# Patient Record
Sex: FEMALE | Race: WHITE | NOT HISPANIC OR LATINO | URBAN - METROPOLITAN AREA
[De-identification: names, ages, dates, MRNs, and addresses within clinical notes are randomized per-mention and may not be internally consistent; named-entity substitution may affect disease eponyms.]

---

## 2021-06-24 ENCOUNTER — APPOINTMENT (RX ONLY)
Dept: URBAN - METROPOLITAN AREA CLINIC 349 | Facility: CLINIC | Age: 41
Setting detail: DERMATOLOGY
End: 2021-06-24

## 2021-06-24 DIAGNOSIS — L57.0 ACTINIC KERATOSIS: ICD-10-CM

## 2021-06-24 DIAGNOSIS — D22 MELANOCYTIC NEVI: ICD-10-CM

## 2021-06-24 DIAGNOSIS — Z12.83 ENCOUNTER FOR SCREENING FOR MALIGNANT NEOPLASM OF SKIN: ICD-10-CM

## 2021-06-24 PROBLEM — D22.5 MELANOCYTIC NEVI OF TRUNK: Status: ACTIVE | Noted: 2021-06-24

## 2021-06-24 PROCEDURE — 99202 OFFICE O/P NEW SF 15 MIN: CPT | Mod: 25

## 2021-06-24 PROCEDURE — ? COUNSELING

## 2021-06-24 PROCEDURE — 17003 DESTRUCT PREMALG LES 2-14: CPT

## 2021-06-24 PROCEDURE — ? LIQUID NITROGEN

## 2021-06-24 PROCEDURE — 17000 DESTRUCT PREMALG LESION: CPT

## 2021-06-24 ASSESSMENT — LOCATION DETAILED DESCRIPTION DERM
LOCATION DETAILED: RIGHT NASAL SIDEWALL
LOCATION DETAILED: NASAL DORSUM
LOCATION DETAILED: RIGHT MEDIAL UPPER BACK
LOCATION DETAILED: EPIGASTRIC SKIN
LOCATION DETAILED: MIDDLE STERNUM

## 2021-06-24 ASSESSMENT — LOCATION ZONE DERM
LOCATION ZONE: NOSE
LOCATION ZONE: TRUNK

## 2021-06-24 ASSESSMENT — LOCATION SIMPLE DESCRIPTION DERM
LOCATION SIMPLE: RIGHT NOSE
LOCATION SIMPLE: RIGHT UPPER BACK
LOCATION SIMPLE: CHEST
LOCATION SIMPLE: ABDOMEN
LOCATION SIMPLE: NOSE

## 2021-06-24 ASSESSMENT — PAIN INTENSITY VAS: HOW INTENSE IS YOUR PAIN 0 BEING NO PAIN, 10 BEING THE MOST SEVERE PAIN POSSIBLE?: 1/10 PAIN

## 2021-11-05 ENCOUNTER — HOSPITAL ENCOUNTER (OUTPATIENT)
Dept: LAB | Age: 41
Discharge: HOME | End: 2021-11-05

## 2021-11-05 PROCEDURE — 88312 SPECIAL STAINS GROUP 1: CPT

## 2021-11-05 PROCEDURE — 88305 TISSUE EXAM BY PATHOLOGIST: CPT

## 2022-04-04 ENCOUNTER — HOSPITAL ENCOUNTER (EMERGENCY)
Age: 42
Discharge: HOME | End: 2022-04-04
Attending: EMERGENCY MEDICINE
Payer: COMMERCIAL

## 2022-04-04 ENCOUNTER — APPOINTMENT (OUTPATIENT)
Dept: ULTRASOUND IMAGING | Age: 42
End: 2022-04-04
Attending: PHYSICIAN ASSISTANT
Payer: COMMERCIAL

## 2022-04-04 VITALS
DIASTOLIC BLOOD PRESSURE: 75 MMHG | HEIGHT: 70 IN | SYSTOLIC BLOOD PRESSURE: 107 MMHG | OXYGEN SATURATION: 99 % | TEMPERATURE: 97.8 F | HEART RATE: 97 BPM | BODY MASS INDEX: 21.62 KG/M2 | WEIGHT: 151 LBS | RESPIRATION RATE: 20 BRPM

## 2022-04-04 DIAGNOSIS — R10.11 RUQ ABDOMINAL PAIN: Primary | ICD-10-CM

## 2022-04-04 LAB
ALBUMIN SERPL-MCNC: 3.7 G/DL (ref 3.5–5)
ALBUMIN/GLOB SERPL: 1 {RATIO} (ref 1.2–3.5)
ALP SERPL-CCNC: 81 U/L (ref 50–130)
ALT SERPL-CCNC: 19 U/L (ref 12–65)
ANION GAP SERPL CALC-SCNC: 6 MMOL/L (ref 7–16)
AST SERPL-CCNC: 13 U/L (ref 15–37)
BASOPHILS # BLD: 0 K/UL (ref 0–0.2)
BASOPHILS NFR BLD: 0 % (ref 0–2)
BILIRUB SERPL-MCNC: 0.5 MG/DL (ref 0.2–1.1)
BUN SERPL-MCNC: 8 MG/DL (ref 6–23)
CALCIUM SERPL-MCNC: 9.5 MG/DL (ref 8.3–10.4)
CHLORIDE SERPL-SCNC: 110 MMOL/L (ref 98–107)
CO2 SERPL-SCNC: 28 MMOL/L (ref 21–32)
CREAT SERPL-MCNC: 0.87 MG/DL (ref 0.6–1)
DIFFERENTIAL METHOD BLD: ABNORMAL
EOSINOPHIL # BLD: 0.1 K/UL (ref 0–0.8)
EOSINOPHIL NFR BLD: 1 % (ref 0.5–7.8)
ERYTHROCYTE [DISTWIDTH] IN BLOOD BY AUTOMATED COUNT: 12.4 % (ref 11.9–14.6)
GLOBULIN SER CALC-MCNC: 3.7 G/DL (ref 2.3–3.5)
GLUCOSE SERPL-MCNC: 100 MG/DL (ref 65–100)
HCG UR QL: NEGATIVE
HCT VFR BLD AUTO: 39.1 % (ref 35.8–46.3)
HGB BLD-MCNC: 13.1 G/DL (ref 11.7–15.4)
IMM GRANULOCYTES # BLD AUTO: 0 K/UL (ref 0–0.5)
IMM GRANULOCYTES NFR BLD AUTO: 0 % (ref 0–5)
LIPASE SERPL-CCNC: 104 U/L (ref 73–393)
LYMPHOCYTES # BLD: 0.9 K/UL (ref 0.5–4.6)
LYMPHOCYTES NFR BLD: 9 % (ref 13–44)
MCH RBC QN AUTO: 29.8 PG (ref 26.1–32.9)
MCHC RBC AUTO-ENTMCNC: 33.5 G/DL (ref 31.4–35)
MCV RBC AUTO: 89.1 FL (ref 79.6–97.8)
MONOCYTES # BLD: 0.4 K/UL (ref 0.1–1.3)
MONOCYTES NFR BLD: 4 % (ref 4–12)
NEUTS SEG # BLD: 8.9 K/UL (ref 1.7–8.2)
NEUTS SEG NFR BLD: 86 % (ref 43–78)
NRBC # BLD: 0 K/UL (ref 0–0.2)
PLATELET # BLD AUTO: 200 K/UL (ref 150–450)
PMV BLD AUTO: 9.4 FL (ref 9.4–12.3)
POTASSIUM SERPL-SCNC: 3.6 MMOL/L (ref 3.5–5.1)
PROT SERPL-MCNC: 7.4 G/DL (ref 6.3–8.2)
RBC # BLD AUTO: 4.39 M/UL (ref 4.05–5.2)
SODIUM SERPL-SCNC: 144 MMOL/L (ref 136–145)
WBC # BLD AUTO: 10.5 K/UL (ref 4.3–11.1)

## 2022-04-04 PROCEDURE — 99284 EMERGENCY DEPT VISIT MOD MDM: CPT

## 2022-04-04 PROCEDURE — 80053 COMPREHEN METABOLIC PANEL: CPT

## 2022-04-04 PROCEDURE — 74011250636 HC RX REV CODE- 250/636: Performed by: PHYSICIAN ASSISTANT

## 2022-04-04 PROCEDURE — 83690 ASSAY OF LIPASE: CPT

## 2022-04-04 PROCEDURE — 85025 COMPLETE CBC W/AUTO DIFF WBC: CPT

## 2022-04-04 PROCEDURE — 96375 TX/PRO/DX INJ NEW DRUG ADDON: CPT

## 2022-04-04 PROCEDURE — 81003 URINALYSIS AUTO W/O SCOPE: CPT

## 2022-04-04 PROCEDURE — 96374 THER/PROPH/DIAG INJ IV PUSH: CPT

## 2022-04-04 PROCEDURE — 74011250636 HC RX REV CODE- 250/636: Performed by: NURSE PRACTITIONER

## 2022-04-04 PROCEDURE — 76705 ECHO EXAM OF ABDOMEN: CPT

## 2022-04-04 PROCEDURE — 81025 URINE PREGNANCY TEST: CPT

## 2022-04-04 RX ORDER — OMEPRAZOLE 40 MG/1
40 CAPSULE, DELAYED RELEASE ORAL AS NEEDED
COMMUNITY

## 2022-04-04 RX ORDER — KETOROLAC TROMETHAMINE 30 MG/ML
15 INJECTION, SOLUTION INTRAMUSCULAR; INTRAVENOUS ONCE
Status: COMPLETED | OUTPATIENT
Start: 2022-04-04 | End: 2022-04-04

## 2022-04-04 RX ORDER — DICLOFENAC SODIUM 75 MG/1
75 TABLET, DELAYED RELEASE ORAL 2 TIMES DAILY
Qty: 14 TABLET | Refills: 0 | Status: SHIPPED | OUTPATIENT
Start: 2022-04-04 | End: 2022-04-11

## 2022-04-04 RX ORDER — ONDANSETRON 2 MG/ML
4 INJECTION INTRAMUSCULAR; INTRAVENOUS
Status: COMPLETED | OUTPATIENT
Start: 2022-04-04 | End: 2022-04-04

## 2022-04-04 RX ORDER — LORATADINE 10 MG/1
10 TABLET ORAL DAILY
COMMUNITY

## 2022-04-04 RX ORDER — ANASTROZOLE 1 MG/1
1 TABLET ORAL DAILY
COMMUNITY
Start: 2022-03-02

## 2022-04-04 RX ADMIN — KETOROLAC TROMETHAMINE 15 MG: 30 INJECTION, SOLUTION INTRAMUSCULAR at 12:19

## 2022-04-04 RX ADMIN — ONDANSETRON 4 MG: 2 INJECTION INTRAMUSCULAR; INTRAVENOUS at 11:36

## 2022-04-04 RX ADMIN — SODIUM CHLORIDE 1000 ML: 900 INJECTION, SOLUTION INTRAVENOUS at 12:18

## 2022-04-04 NOTE — ED TRIAGE NOTES
Pt states right sided abd pain since this morning. States she went to plastic surgeon this morning and had expanders removed. States she started a new medication on Sat. Denies vomiting or diarrhea. Masked for triage.

## 2022-04-04 NOTE — ED NOTES
I have reviewed discharge instructions with the patient. The patient verbalized understanding. Patient left ED via Discharge Method: ambulatory to Home with  Spouse. Opportunity for questions and clarification provided. Patient given 1 scripts. To continue your aftercare when you leave the hospital, you may receive an automated call from our care team to check in on how you are doing. This is a free service and part of our promise to provide the best care and service to meet your aftercare needs.  If you have questions, or wish to unsubscribe from this service please call [phone]. Thank you for Choosing our [address] [place] [address] Emergency Department.

## 2022-04-04 NOTE — ED PROVIDER NOTES
Patient presents to the emergency department stating that this morning around 845 she developed some right upper abdominal pain that started after eating food. Later she went to a plastic surgery follow-up appointment as she has a history of breast cancer and had a double mastectomy. She states that the surgeon looked at her wound on her breast and feels that things are healing well. They were concerned about her abdominal pain as I did not feel like it was related to her breast and recommended she go to the emergency department. Patient denies any fever, chills, vomiting or diarrhea. She felt mildly nauseous earlier today due to pain. Patient denies any chest pain or shortness of breath. States that her last surgical procedure was 2 weeks ago where she had the expanders removed from her breast as she was not a candidate for reconstructive surgery           Past Medical History:   Diagnosis Date    Gastrointestinal disorder     GERD       Past Surgical History:   Procedure Laterality Date    HX GYN      c section         No family history on file. Social History     Socioeconomic History    Marital status:      Spouse name: Not on file    Number of children: Not on file    Years of education: Not on file    Highest education level: Not on file   Occupational History    Not on file   Tobacco Use    Smoking status: Never Smoker    Smokeless tobacco: Not on file   Substance and Sexual Activity    Alcohol use: No    Drug use: No    Sexual activity: Not on file   Other Topics Concern    Not on file   Social History Narrative    Not on file     Social Determinants of Health     Financial Resource Strain:     Difficulty of Paying Living Expenses: Not on file   Food Insecurity:     Worried About Running Out of Food in the Last Year: Not on file    Deloris of Food in the Last Year: Not on file   Transportation Needs:     Lack of Transportation (Medical):  Not on file    Lack of Transportation (Non-Medical): Not on file   Physical Activity:     Days of Exercise per Week: Not on file    Minutes of Exercise per Session: Not on file   Stress:     Feeling of Stress : Not on file   Social Connections:     Frequency of Communication with Friends and Family: Not on file    Frequency of Social Gatherings with Friends and Family: Not on file    Attends Oriental orthodox Services: Not on file    Active Member of [address] [place] [address] or Organizations: Not on file    Attends Club or Organization Meetings: Not on file    Marital Status: Not on file   Intimate Partner Violence:     Fear of Current or Ex-Partner: Not on file    Emotionally Abused: Not on file    Physically Abused: Not on file    Sexually Abused: Not on file   Housing Stability:     Unable to Pay for Housing in the Last Year: Not on file    Number of [place] in the Last Year: Not on file    Unstable Housing in the Last Year: Not on file         ALLERGIES: Xylocaine [lidocaine hcl] and Pcn [penicillins]    Review of Systems   Constitutional: Negative for fever. Gastrointestinal: Positive for abdominal pain and nausea. All other systems reviewed and are negative. Vitals:    04/04/22 1115   BP: 131/81   Pulse: 97   Resp: 20   Temp: 97.8 °F (36.6 °C)   SpO2: 100%   Weight: 68.5 kg (151 lb)   Height: 5' 10\" (1.778 m)            Physical Exam  Vitals and nursing note reviewed. Constitutional:       Appearance: She is well-developed. HENT:      Head: Normocephalic and atraumatic. Nose: Nose normal.      Mouth/Throat:      Mouth: Mucous membranes are moist.   Eyes:      Extraocular Movements: Extraocular movements intact. Conjunctiva/sclera: Conjunctivae normal.      Pupils: Pupils are equal, round, and reactive to light. Cardiovascular:      Rate and Rhythm: Normal rate and regular rhythm. Pulses: Normal pulses. Heart sounds: Normal heart sounds.    Pulmonary:      Effort: Pulmonary effort is normal.      Breath sounds: Normal breath sounds. Abdominal:      General: Abdomen is flat. Palpations: Abdomen is soft. Comments: Patient has mild right upper quadrant tenderness to palpation with positive Starr sign. No masses or guarding. Mild rebound tenderness right upper quadrant   Musculoskeletal:         General: Normal range of motion. Cervical back: Normal range of motion and neck supple. Skin:     General: Skin is warm and dry. Capillary Refill: Capillary refill takes less than 2 seconds. Neurological:      General: No focal deficit present. Mental Status: She is alert. Mental status is at baseline. Psychiatric:         Mood and Affect: Mood normal.         Behavior: Behavior normal.         Thought Content: Thought content normal.          MDM  Number of Diagnoses or Management Options  Diagnosis management comments: Differential diagnoses: Cholelithiasis biliary colic, pancreatitis, pulmonary embolus, gastritis    I rechecked patient after IV Toradol and she is feeling dramatically better. Labs are all unremarkable and gallbladder ultrasound is normal.  I will have her continue to take an anti-inflammatory for pain and she can follow-up with GI if she continues to have the right upper quadrant pain after eating food. Patient has no chest pain or shortness of breath and denies any pleuritic pain.   I discussed management with ER attending Dr. Neetu Welch who is in agreement with plan       Amount and/or Complexity of Data Reviewed  Clinical lab tests: reviewed  Tests in the radiology section of CPT®: reviewed  Discuss the patient with other providers: yes    Risk of Complications, Morbidity, and/or Mortality  Presenting problems: moderate  Diagnostic procedures: moderate  Management options: moderate    Patient Progress  Patient progress: improved         Procedures

## 2022-04-04 NOTE — ED NOTES
35 y.o. F with RUQ pain that began this morning. Reports nausea. Denies vomiting, diarrhea, or fever. Still has gall bladder. Began taking anastrozole on Saturday. Recent surgery for hx of breast cancer. Bending forward improves pain. Patient appears uncomfortable in triage but no acute distress. VSS in triage. Afebrile. Patient evaluated initially in triage. Rapid Medical Evaluation was conducted and necessary orders have been placed. I have performed a medical screening exam.  Care will now be transferred to the provider in the back of the emergency department.   Dee Nails NP 11:13 AM

## 2022-04-04 NOTE — DISCHARGE INSTRUCTIONS
Work and gallbladder ultrasound are normal.  Your abdominal discomfort may be related to the new medication you started which can have GI side effects. I would recommend however following up with the GI physician listed later this week if you continue to have right upper quadrant pain after eating meals.   You could also buy over-the-counter Prilosec and take it daily with your pain reliever to help protect her stomach

## 2022-04-07 ENCOUNTER — APPOINTMENT (RX ONLY)
Dept: URBAN - METROPOLITAN AREA CLINIC 329 | Facility: CLINIC | Age: 42
Setting detail: DERMATOLOGY
End: 2022-04-07

## 2022-04-07 DIAGNOSIS — L81.4 OTHER MELANIN HYPERPIGMENTATION: ICD-10-CM

## 2022-04-07 DIAGNOSIS — D18.0 HEMANGIOMA: ICD-10-CM

## 2022-04-07 DIAGNOSIS — D22 MELANOCYTIC NEVI: ICD-10-CM

## 2022-04-07 DIAGNOSIS — L82.0 INFLAMED SEBORRHEIC KERATOSIS: ICD-10-CM

## 2022-04-07 DIAGNOSIS — L57.0 ACTINIC KERATOSIS: ICD-10-CM

## 2022-04-07 DIAGNOSIS — L57.8 OTHER SKIN CHANGES DUE TO CHRONIC EXPOSURE TO NONIONIZING RADIATION: ICD-10-CM

## 2022-04-07 DIAGNOSIS — L90.5 SCAR CONDITIONS AND FIBROSIS OF SKIN: ICD-10-CM

## 2022-04-07 PROBLEM — D18.01 HEMANGIOMA OF SKIN AND SUBCUTANEOUS TISSUE: Status: ACTIVE | Noted: 2022-04-07

## 2022-04-07 PROBLEM — D22.71 MELANOCYTIC NEVI OF RIGHT LOWER LIMB, INCLUDING HIP: Status: ACTIVE | Noted: 2022-04-07

## 2022-04-07 PROBLEM — D22.61 MELANOCYTIC NEVI OF RIGHT UPPER LIMB, INCLUDING SHOULDER: Status: ACTIVE | Noted: 2022-04-07

## 2022-04-07 PROBLEM — D22.5 MELANOCYTIC NEVI OF TRUNK: Status: ACTIVE | Noted: 2022-04-07

## 2022-04-07 PROCEDURE — ? DERMATOSCOPIC EVALUATION

## 2022-04-07 PROCEDURE — ? RECOMMENDATIONS

## 2022-04-07 PROCEDURE — ? DIAGNOSIS COMMENT

## 2022-04-07 PROCEDURE — ? EDUCATIONAL RESOURCES PROVIDED

## 2022-04-07 PROCEDURE — 17110 DESTRUCTION B9 LES UP TO 14: CPT

## 2022-04-07 PROCEDURE — 99213 OFFICE O/P EST LOW 20 MIN: CPT | Mod: 25

## 2022-04-07 PROCEDURE — ? ADDITIONAL NOTES

## 2022-04-07 PROCEDURE — ? LIQUID NITROGEN

## 2022-04-07 PROCEDURE — ? TREATMENT REGIMEN

## 2022-04-07 PROCEDURE — ? BENIGN DESTRUCTION

## 2022-04-07 PROCEDURE — 17000 DESTRUCT PREMALG LESION: CPT | Mod: 59

## 2022-04-07 PROCEDURE — ? COUNSELING

## 2022-04-07 ASSESSMENT — LOCATION SIMPLE DESCRIPTION DERM
LOCATION SIMPLE: ABDOMEN
LOCATION SIMPLE: CHEST
LOCATION SIMPLE: RIGHT SHOULDER
LOCATION SIMPLE: LEFT UPPER ARM
LOCATION SIMPLE: RIGHT BREAST
LOCATION SIMPLE: LEFT CHEEK
LOCATION SIMPLE: LEFT UPPER BACK
LOCATION SIMPLE: RIGHT PRETIBIAL REGION
LOCATION SIMPLE: RIGHT SUPERIOR EYELID
LOCATION SIMPLE: ANTERIOR SCALP
LOCATION SIMPLE: LEFT BREAST
LOCATION SIMPLE: LEFT THIGH
LOCATION SIMPLE: RIGHT UPPER ARM
LOCATION SIMPLE: UPPER BACK
LOCATION SIMPLE: NOSE
LOCATION SIMPLE: LEFT LOWER BACK
LOCATION SIMPLE: RIGHT LOWER BACK
LOCATION SIMPLE: RIGHT UPPER BACK
LOCATION SIMPLE: RIGHT POSTERIOR THIGH

## 2022-04-07 ASSESSMENT — LOCATION DETAILED DESCRIPTION DERM
LOCATION DETAILED: RIGHT INFERIOR MEDIAL MIDBACK
LOCATION DETAILED: LEFT ANTERIOR DISTAL THIGH
LOCATION DETAILED: RIGHT ANTERIOR DISTAL UPPER ARM
LOCATION DETAILED: RIGHT PROXIMAL PRETIBIAL REGION
LOCATION DETAILED: RIGHT MEDIAL BREAST 1-2:00 REGION
LOCATION DETAILED: LEFT ANTERIOR DISTAL UPPER ARM
LOCATION DETAILED: MID-FRONTAL SCALP
LOCATION DETAILED: LEFT CENTRAL MALAR CHEEK
LOCATION DETAILED: RIGHT DISTAL POSTERIOR THIGH
LOCATION DETAILED: SUPERIOR THORACIC SPINE
LOCATION DETAILED: LEFT LATERAL BREAST 12-1:00 REGION
LOCATION DETAILED: LEFT SUPERIOR UPPER BACK
LOCATION DETAILED: RIGHT SUPERIOR UPPER BACK
LOCATION DETAILED: LEFT ANTERIOR PROXIMAL UPPER ARM
LOCATION DETAILED: RIGHT ANTERIOR PROXIMAL UPPER ARM
LOCATION DETAILED: RIGHT RIB CAGE
LOCATION DETAILED: RIGHT LATERAL SUPERIOR EYELID
LOCATION DETAILED: LEFT INFERIOR MEDIAL MIDBACK
LOCATION DETAILED: RIGHT POSTERIOR SHOULDER
LOCATION DETAILED: LEFT LATERAL SUPERIOR CHEST
LOCATION DETAILED: EPIGASTRIC SKIN
LOCATION DETAILED: MIDDLE STERNUM
LOCATION DETAILED: LEFT MEDIAL UPPER BACK
LOCATION DETAILED: RIGHT NASAL DORSUM
LOCATION DETAILED: UPPER STERNUM

## 2022-04-07 ASSESSMENT — LOCATION ZONE DERM
LOCATION ZONE: EYELID
LOCATION ZONE: SCALP
LOCATION ZONE: ARM
LOCATION ZONE: LEG
LOCATION ZONE: TRUNK
LOCATION ZONE: NOSE
LOCATION ZONE: FACE

## 2022-04-07 NOTE — PROCEDURE: BENIGN DESTRUCTION
Detail Level: Detailed
Treatment Number (Will Not Render If 0): 0
Medical Necessity Information: It is in your best interest to select a reason for this procedure from the list below. All of these items fulfill various CMS LCD requirements except the new and changing color options.
Render Note In Bullet Format When Appropriate: No
Consent: The patient's consent was obtained including but not limited to risks of crusting, scabbing, blistering, scarring, darker or lighter pigmentary change, recurrence, incomplete removal and infection.
Medical Necessity Clause: This procedure was medically necessary because the lesions that were treated were:
Anesthesia Volume In Cc: 0.5
Post-Care Instructions: I reviewed with the patient in detail post-care instructions. Patient is to wear sunprotection, and avoid picking at any of the treated lesions. Pt may apply Vaseline to crusted or scabbing areas.
no

## 2022-04-07 NOTE — PROCEDURE: RECOMMENDATIONS
Recommendation Preamble: The following recommendations were made during the visit:
Recommendations (Free Text): Mederma or vitamin E
Render Risk Assessment In Note?: no
Detail Level: Zone

## 2022-06-24 ENCOUNTER — APPOINTMENT (RX ONLY)
Dept: URBAN - METROPOLITAN AREA CLINIC 329 | Facility: CLINIC | Age: 42
Setting detail: DERMATOLOGY
End: 2022-06-24

## 2022-06-24 DIAGNOSIS — D22 MELANOCYTIC NEVI: ICD-10-CM

## 2022-06-24 DIAGNOSIS — L81.4 OTHER MELANIN HYPERPIGMENTATION: ICD-10-CM

## 2022-06-24 DIAGNOSIS — D18.0 HEMANGIOMA: ICD-10-CM

## 2022-06-24 DIAGNOSIS — L57.8 OTHER SKIN CHANGES DUE TO CHRONIC EXPOSURE TO NONIONIZING RADIATION: ICD-10-CM

## 2022-06-24 DIAGNOSIS — L82.1 OTHER SEBORRHEIC KERATOSIS: ICD-10-CM

## 2022-06-24 PROBLEM — D18.01 HEMANGIOMA OF SKIN AND SUBCUTANEOUS TISSUE: Status: ACTIVE | Noted: 2022-06-24

## 2022-06-24 PROBLEM — D22.5 MELANOCYTIC NEVI OF TRUNK: Status: ACTIVE | Noted: 2022-06-24

## 2022-06-24 PROBLEM — D22.72 MELANOCYTIC NEVI OF LEFT LOWER LIMB, INCLUDING HIP: Status: ACTIVE | Noted: 2022-06-24

## 2022-06-24 PROCEDURE — 99213 OFFICE O/P EST LOW 20 MIN: CPT

## 2022-06-24 PROCEDURE — ? TREATMENT REGIMEN

## 2022-06-24 PROCEDURE — ? ADDITIONAL NOTES

## 2022-06-24 PROCEDURE — ? DERMATOSCOPIC EVALUATION

## 2022-06-24 PROCEDURE — ? COUNSELING

## 2022-06-24 PROCEDURE — ? DERMTECH: PLA + TERT ADD ON ASSAY

## 2022-06-24 ASSESSMENT — LOCATION DETAILED DESCRIPTION DERM
LOCATION DETAILED: LEFT ANTERIOR LATERAL PROXIMAL THIGH
LOCATION DETAILED: LEFT INFERIOR UPPER BACK
LOCATION DETAILED: RIGHT ANTERIOR DISTAL UPPER ARM
LOCATION DETAILED: MID-FRONTAL SCALP
LOCATION DETAILED: SUPERIOR THORACIC SPINE
LOCATION DETAILED: EPIGASTRIC SKIN
LOCATION DETAILED: LEFT ANTERIOR DISTAL UPPER ARM
LOCATION DETAILED: LEFT CENTRAL MALAR CHEEK
LOCATION DETAILED: UPPER STERNUM

## 2022-06-24 ASSESSMENT — LOCATION SIMPLE DESCRIPTION DERM
LOCATION SIMPLE: RIGHT UPPER ARM
LOCATION SIMPLE: LEFT UPPER BACK
LOCATION SIMPLE: ABDOMEN
LOCATION SIMPLE: CHEST
LOCATION SIMPLE: UPPER BACK
LOCATION SIMPLE: LEFT UPPER ARM
LOCATION SIMPLE: LEFT CHEEK
LOCATION SIMPLE: ANTERIOR SCALP
LOCATION SIMPLE: LEFT THIGH

## 2022-06-24 ASSESSMENT — LOCATION ZONE DERM
LOCATION ZONE: FACE
LOCATION ZONE: TRUNK
LOCATION ZONE: SCALP
LOCATION ZONE: LEG
LOCATION ZONE: ARM

## 2022-08-31 ENCOUNTER — APPOINTMENT (RX ONLY)
Dept: URBAN - METROPOLITAN AREA CLINIC 329 | Facility: CLINIC | Age: 42
Setting detail: DERMATOLOGY
End: 2022-08-31

## 2022-08-31 DIAGNOSIS — L20.89 OTHER ATOPIC DERMATITIS: ICD-10-CM

## 2022-08-31 DIAGNOSIS — D22 MELANOCYTIC NEVI: ICD-10-CM

## 2022-08-31 PROBLEM — D22.72 MELANOCYTIC NEVI OF LEFT LOWER LIMB, INCLUDING HIP: Status: ACTIVE | Noted: 2022-08-31

## 2022-08-31 PROBLEM — L20.84 INTRINSIC (ALLERGIC) ECZEMA: Status: ACTIVE | Noted: 2022-08-31

## 2022-08-31 PROCEDURE — ? COUNSELING

## 2022-08-31 PROCEDURE — ? PRESCRIPTION MEDICATION MANAGEMENT

## 2022-08-31 PROCEDURE — 99213 OFFICE O/P EST LOW 20 MIN: CPT

## 2022-08-31 ASSESSMENT — LOCATION SIMPLE DESCRIPTION DERM
LOCATION SIMPLE: LEFT POSTERIOR THIGH
LOCATION SIMPLE: LEFT THIGH
LOCATION SIMPLE: LEFT POSTERIOR THIGH
LOCATION SIMPLE: LEFT THIGH

## 2022-08-31 ASSESSMENT — LOCATION DETAILED DESCRIPTION DERM
LOCATION DETAILED: LEFT ANTERIOR LATERAL PROXIMAL THIGH
LOCATION DETAILED: LEFT DISTAL POSTERIOR THIGH
LOCATION DETAILED: LEFT ANTERIOR LATERAL PROXIMAL THIGH
LOCATION DETAILED: LEFT DISTAL POSTERIOR THIGH

## 2022-08-31 ASSESSMENT — LOCATION ZONE DERM
LOCATION ZONE: LEG
LOCATION ZONE: LEG

## 2022-08-31 ASSESSMENT — ITCH NUMERIC RATING SCALE: HOW SEVERE IS YOUR ITCHING?: 2

## 2022-10-25 ENCOUNTER — OFFICE VISIT (OUTPATIENT)
Dept: OBGYN CLINIC | Age: 42
End: 2022-10-25
Payer: COMMERCIAL

## 2022-10-25 VITALS
SYSTOLIC BLOOD PRESSURE: 110 MMHG | WEIGHT: 148 LBS | BODY MASS INDEX: 21.19 KG/M2 | HEIGHT: 70 IN | DIASTOLIC BLOOD PRESSURE: 80 MMHG

## 2022-10-25 DIAGNOSIS — Z12.4 SCREENING FOR CERVICAL CANCER: ICD-10-CM

## 2022-10-25 DIAGNOSIS — Z12.12 SCREENING FOR RECTAL CANCER: ICD-10-CM

## 2022-10-25 DIAGNOSIS — Z12.31 ENCOUNTER FOR SCREENING MAMMOGRAM FOR MALIGNANT NEOPLASM OF BREAST: ICD-10-CM

## 2022-10-25 DIAGNOSIS — N95.1 MENOPAUSAL SYMPTOM: ICD-10-CM

## 2022-10-25 DIAGNOSIS — Z01.419 WELL WOMAN EXAM WITH ROUTINE GYNECOLOGICAL EXAM: Primary | ICD-10-CM

## 2022-10-25 PROCEDURE — 99214 OFFICE O/P EST MOD 30 MIN: CPT | Performed by: OBSTETRICS & GYNECOLOGY

## 2022-10-25 PROCEDURE — 99396 PREV VISIT EST AGE 40-64: CPT | Performed by: OBSTETRICS & GYNECOLOGY

## 2022-10-25 NOTE — PATIENT INSTRUCTIONS
Vitamin D3 1,000 IU  Calcium Citrate 600mg The IC - Smart Diet    Many people with Interstitial Cystitis (IC) find that simple changes in their diet can help to control IC symptoms and avoid IC flare ups. Typically, avoiding foods high in acid and potassium, as well as beverages containing caffeine and alcohol, is a good idea. This helpful guide can help you make \"IC-Smart\" meal choices. Keep it handy for easy references when dining out or when preparing meals at home. Fruits:    Allowable - Blueberries, Melons (except Cantaloupe) and Pears  Avoid - All other fruits and juice    Vegetables:  Allowable - Homegrown Tomatoes and some Vegetables (except those listed below)  Avoid - Store-bought Tomatoes, Onions, Tofu, Soybeans, [place] Beans, and Suzie Beans    Milk/Dairy:  Allowable - Milk, American Cheese, Cottage Cheese, Frozen Yogurt, and White Chocolate  Avoid - Yogurt, Sour Cream, Aged Cheeses, and Chocolate    Carbohydrates/Grains:  Allowable - Pasta, Rise, Potatoes, and some Breads (except those listed below)  Avoid - [place] and Sourdough Breads    Meats/Fish:  Allowable - Poultry, Fish and some meats (except those listed below)  Avoid - Aged, Canned, Cured, Processed, or Smoked meats/fish, Anchovies, Caviar, Chicken Livers, Corned Beef, and meats contain Nitrates or Nitrites    Nuts:  Allowable - Almonds, Cashews, and Pine Nuts  Avoid - Most other Nuts    Beverages:  Allowable - Bottled or Spring Water, Decaffeinated-acid free Coffee or Tea, some Herbal Teas, Flat Soda  Avoid - Alcoholic Beverages including Beer and Wine, Carbonated drinks such as Soda.   Coffee and Tea, and Fruit Juices especially Citrus and Cranberry    Seasonings:  Allowable - Garlic and some other seasonings (except those listed below)  Avoid - Mayonnaise, Miso, Soy Sauce, Salad Dressing, Vinegar and Spicy Foods (especially [place], [place], [facility], [place] ([facility]) and [employer])    Preservatives:  Avoids - Benzol Alcohol, Citric Acid, Monosodium Glutamate of the scalp, and it grew back when [facility] was discontinued. [facility] is a weak anticoagulant (blood thinner), which may cause increased bleeding. Call your doctor if you will be undergoing surgery or will begin taking anticoagulant therapy, such as Warfain, Heparin, or high doses of Aspirin; or anti-inflammatory drugs, such as Ibuprofen. Please see full Prescription information available from your physician. TWENTY MINUTES TO IMPROVED BLADDER CONTROL    Millions of women experience some degree of urinary incontinence or involuntary urination. The following pelvic floor exercises, called Kegel exercises, can help you to improve bladder control. Set aside some time in the morning, afternoon, and evening. Sit comfortably upright and relax. Although this is an important exercise, there is no need to strain. Before doing Kegel exercises, it is necessary to identify the pelvic-floor muscle. Place your finger inside your vagina. Squeeze around your finger. That's the muscle you want to exercise. Once you have isolated the muscle several times, there is no need to place your finger in the vagina to do the exercise. 1.  SQUEEZE AND HOLD FOR 10 SECONDS          Then relax for 10 seconds. Remember, it is as important to relax as it is to squeeze this muscle. 2.  WHEN TO DO THIS EXERCISE          15 times in the morning          15 times in the afternoon          20 times at night          Try to work up to doing 25 exercises at one time. Initially, you may not be able to hold and squeeze for 10 seconds. Don't get            Discouraged. Over a 2-week period your technique should improve. 3  THINGS TO REMEMBER          A. Never use your stomach, legs or buttock muscles. Place your hand on  your abdomen while you are squeezing your pelvic                Floor muscle. If you feel your abdomen move, then you are also using these muscles.             B.  Build strength slowly; don't expect results right away. C.  The great thing about this exercise is that after you bcome familiar with it, it can be done anyplace. No one can you see you                 Exercing this internal muscle. 4.   WHEN YOU WILL SEE A CHANGE           After about 2 weeks of doing this exercise you should notice fewer \"accidents\".

## 2022-10-25 NOTE — PROGRESS NOTES
Davida Patel  is a 38 y.o. No obstetric history on file. who is here for an annual exam.      History  Past Medical History:   Diagnosis Date    Breast cancer in female [facility])     Gastrointestinal disorder     GERD    ON FILE  Past Surgical History:   Procedure Laterality Date    GYN      c section    LYMPH NODE DISSECTION N/A     OTHER SURGICAL HISTORY      double masectomy    PRESENT IN FILE  Current Outpatient Medications on File Prior to Visit   Medication Sig Dispense Refill    leuprolide (LUPRON) 11.25 MG injection Inject 11.25 mg into the muscle once      anastrozole (ARIMIDEX) 1 MG tablet Take 1 mg by mouth daily      lansoprazole (PREVACID) 15 MG delayed release capsule Take by mouth every morning (before breakfast)      loratadine (CLARITIN) 10 MG tablet Take 10 mg by mouth daily      omeprazole (PRILOSEC) 40 MG delayed release capsule Take 40 mg by mouth as needed       No current facility-administered medications on file prior to visit. SEE UPDATED LIST  Allergies   Allergen Reactions    Lidocaine Hcl Anaphylaxis    Penicillins Rash    Iodine Swelling    Clindamycin/Lincomycin Rash   SEE LIST  Social History     Tobacco Use    Smoking status: Never    Smokeless tobacco: Never   Substance Use Topics    Alcohol use: No      No family history on file. OBGYN History:             Physical Exam  Blood pressure 110/80, height 5' 10\" (1.778 m), weight 148 lb (67.1 kg). Body mass index is 21.24 kg/m². Lab Results   Component Value Date/Time    HGB 13.1 04/04/2022 11:32 AM      @LASTPROCAMB([id number])@  No results found for: HCGUQC, PREGU, HCGQR, THCGA1    HEENT unremarkable. Sclera non-icteric. Neck is supple without thyromegaly or nodes. Chest clear to auscultation. Heart regular rate and rhythm with no murmur, Breast exam reveals no masses or nipple discharge. No axillary notes are palpable. Abdomen is benign. BUS is normal.  Cervix IF  present. Pap smeaR performed. PRN  Bimanual exam reveals no masses. Assessment  38 y.o. No obstetric history on file. for annual exam.  Encounter Diagnoses   Name Primary? Well woman exam with routine gynecological exam Yes    Screening for cervical cancer     Encounter for screening mammogram for malignant neoplasm of breast     Screening for rectal cancer        Plan  Orders Placed This Encounter   Procedures    AMB POC FECAL BLOOD, OCCULT, QL 1 CARD   Br ca   lupron  etc   vag x1 cs x1  oab ? ? Some nocturia   dr Carreno Scrape  nodes  1 of 12 pos  col 2022 no polyps  to see Dr Constance Nicole  and consider oopherect has [place] decl fert   \"DEXA ordered\",AT AGE 61          \"Screening olonoscopy ordered\",IF >44YO  DUE         \"Recommend Calcium/MVI\",IF >35YRS  \"Recommend Gardisil immunization\"IF AGE 9-45     }CONTRACEPTION DISCUSSED      STD CHECK OFFERED IF <30YO  ,MAMMOGRAM SCHEDULED IF >41YO          MOOD DISCUSSED             NOT SUICIDAL  HEALTH MEASURE DISCUSSED INCLUDING TEETH/EYE [facility]  APPTS                    DIET/EXERCISE /SLEEP    DISCUSSED                SMOKING DISCUSSED PRN      BLADDER CONTROL DISCUSSED      The patient is here for  problems including  vag atro     HISTORY:      No LMP recorded. (Menstrual status: Chemically Induced). SEE BELOW      Current Outpatient Medications on File Prior to Visit   Medication Sig Dispense Refill    leuprolide (LUPRON) 11.25 MG injection Inject 11.25 mg into the muscle once      anastrozole (ARIMIDEX) 1 MG tablet Take 1 mg by mouth daily      lansoprazole (PREVACID) 15 MG delayed release capsule Take by mouth every morning (before breakfast)      loratadine (CLARITIN) 10 MG tablet Take 10 mg by mouth daily      omeprazole (PRILOSEC) 40 MG delayed release capsule Take 40 mg by mouth as needed       No current facility-administered medications on file prior to visit. SEE LIST    ROS:      PHYSICAL EXAM:  Blood pressure 110/80, height 5' 10\" (1.778 m), weight 148 lb (67.1 kg).     The patient appears well, alert, oriented x 3, in no distress. Lungs are clear. Heart RRR, no murmurs. Abdomen soft without tenderness, guarding, mass or organomegaly. Pelvic: bg     ASSESSMENT:DIAGNOSIS DISCUSSED INCLUDING DIFFERENTIAL vag atro     PLAN:    Orders Placed This Encounter   Procedures    PAP LB, Reflex HPV ASCUS     Standing Status:   Future     Number of Occurrences:   1     Standing Expiration Date:   10/25/2023     Order Specific Question:   Pap Source? (Required)     Answer:   cervical     Order Specific Question:   Pap Source? (Required)     Answer:   endocervical     Order Specific Question:   Pap collection method? (Required     Answer:   brush     Order Specific Question:   Pap collection method? (Required     Answer:   spatula     Order Specific Question:   Menstrual Status ? Answer:   Postmenopausal [2]     Order Specific Question:   Previous Treatment?           (Required)     Answer:   NONE     Complex high risk decision making many questions answered history reviewed precharting done required 30 minute of time discussing a vaiety of problems  goals are set  follow up planned

## 2022-10-27 ENCOUNTER — HOSPITAL ENCOUNTER (OUTPATIENT)
Dept: PHYSICAL THERAPY | Age: 42
Setting detail: RECURRING SERIES
Discharge: HOME | End: 2022-10-30
Payer: COMMERCIAL

## 2022-10-27 DIAGNOSIS — C50.919 MALIGNANT NEOPLASM OF FEMALE BREAST, UNSPECIFIED ESTROGEN RECEPTOR STATUS, UNSPECIFIED LATERALITY, UNSPECIFIED SITE OF BREAST (HCC): Primary | ICD-10-CM

## 2022-10-27 PROCEDURE — 97140 MANUAL THERAPY 1/> REGIONS: CPT

## 2022-10-27 PROCEDURE — 97165 OT EVAL LOW COMPLEX 30 MIN: CPT

## 2022-10-27 ASSESSMENT — PAIN SCALES - GENERAL: PAINLEVEL_OUTOF10: 3

## 2022-10-27 NOTE — THERAPY EVALUATION
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 Referring Physician:  Colin Cali MD MD Orders:  OT Eval and Treat   Return MD Appt:  TBD  Date of Onset:  No data recorded   Allergies:  Lidocaine hcl, Penicillins, Iodine, and Clindamycin/lincomycin  Restrictions/Precautions:    No data recorded  No data recorded  Medications Last Reviewed:  10/27/2022     SUBJECTIVE   History of Injury/Illness (Reason for Referral):  Bilateral mastectomies with right breast showing multifocal disease with at least 10 lesions, largest measuring microscopic positive sentinel lymph and 11 further lymph nodes negative on axillary dissection. On her invasive specimen, ER was strongly positive, MO 13% and HER-2 2+ by IHC. This was amplified by FISH. She completed a full year of Herceptin at the end of May 2022. Arimidex initiated 2022. Is currently on Lupron injection monthly. She is considering bilateral oophorectomy. She is not sure when this will be scheduled, she plans    One of her biggest issues is chronic discomfort in the right axilla and upper extremity post surgery. She also has some hot flashes and sleep issues. She has some cramping in her legs. She has a gabapentin prescription but does not plan to start this right now.  She has been going to lymphedema therapy. Patient Stated Goal(s):  \"heavy and painful arm. Reduce fibrotic tissue\"  Initial:     3/10 Post Session:      /10  Past Medical History/Comorbidities:   Ms. Carlos Ware  has a past medical history of Breast cancer in [facility]) and Gastrointestinal disorder. Ms. Carlos Ware  has a past surgical history that includes gyn; other surgical history; and lymph node dissection (N/A). Social History/Living Environment:   Lives With: Spouse; Family  Type of Home: House     Prior Level of Function/Work/Activity:   Prior level of function: Independent  Occupation: Works at home  No data recorded  No data recorded     Learning:   Does the patient/guardian have any barriers to learning?: No barriers     Fall Risk Scale  Cuellar Total Score: 0  Cuellar Fall Risk: Low (0-24)           OBJECTIVE   Lymphedema:  PRETREATMENT AFFECTED LIMB(s): right upper extremity      Date:  10/27/22         Right / Left           Axilla   []      []           3 inches   []      [] R29  L28          Elbow   []      []           6 inches   []      []           3 inches   []      []           Wrist   []      []           Palm   []      []              []      []         Measurements are taken in centimeters:  2.54 cm = 1 inch  Cumulative circumferential volumetric graph measurements  RUE limb size         LUE limb size                Skin Integumentary:  Skin Integrity: Scars (comment) (mastectomy and lymph dissection)  LLIS:  Total Score: 34    ASSESSMENT   Initial Assessment:  Patient has stage 1 lymphedema in RUE and right upper chest quadrant. Patient has had double mastectomies, chemotherapy, no radiation, and 12 axilla lymph nodes removed. Has minimal swelling in right arm and has JUZO compression sleeve size 2 and wears as much as she can tolerate over the last month.  May need to find compression alternatives for RUE and chest. Pt with proximal fibrotic edema and will benefit from a complex pneumatic pressure pump to assist with home lymph drainage. Patient will also benefit from MLD and exercise to reduce size, increase ROM, and decrease pain in RUE. Also plan to improve surgical scars and treatment to address painful cording in elbow. Initiate OT POC. Problem List (Impacting functional limitations):  Performance deficits / Impairments: Decreased ROM     Therapy Prognosis:   Prognosis: Good     Assessment Complexity:   Low Complexity  PLAN   Effective Dates: 50/16/52 TO Certification Period Expiration Date: 01/25/23 . Frequency/Duration: Plan Frequency: 2x a week for a month   Interventions Planned: (Treatment may consist of any combination of the following)  Current Treatment Recommendations: ROM; Manual Therapy:  MLD; Modalities; Patient/Caregiver education & training; Safety education & training; Strengthening     Short-Term Functional Goals: Time Frame: 30 days  1. The patient/caregiver will verbalize and demonstrate understanding of lymphedema precautions. 2. Patient will be independent with skin care regimen to decrease risk of cellulitis. 3. The patient/caregiver will be independent at donning and doffing right upper extremity compression sleeve and application of chest compression. 4. The patient/caregiver will be independent with self-manual lymph drainage techniques and show decrease in limb volume. 5. Patient will be independent in lymphatic exercises. 6. Patient will reduce painful cording to 1 out of 10 in elbow. 7. Patient will increase length of time wearing day time compression to 8 hours a day. Discharge Goals: Time Frame: 90 days  1. Patient's right upper extremity circumferential measurements will decrease on volumetric graph to maximize functional use in ADL's.    2. The patient/caregiver will be independent with home management of lymphedema. 3. Patient will increase length of time wearing day time compression to 12+ hours a day.                MEDICAL NECESSITY:   > Skilled intervention continues to be required due to Deficits noted abvoe. REASON FOR SERVICES/OTHER COMMENTS:  > Patient continues to require skilled intervention due to Dx above. Manual Lymph Drainage: 15 mintues. Lymph Nodes:    Cervical Supraclavicular Axillary Abdominal Inguinal Popliteal Antecubital   RIGHT [x]     [x]     [x]     [x]     []     []     []       LEFT []     []     [x]     [x]     []     []     []         Anastamoses:   Axillo-axillary Inguino-inguinal Axillo-inguinal Inguino-axillary   ANTERIOR [x]     []     [x]     []       POSTERIOR [x]     []     [x]     []       RIGHT [x]     []     [x]     []       LEFT []     []     []     []         Limbs:   [x]    RUE     []    LUE     []    RLE    []    LLE      Total Duration:  Time In: 0800  Time Out: 0900    Regarding Mynor Suzanne Iqbal's therapy, I certify that the treatment plan above will be carried out by a therapist or under their direction. Thank you for this referral,  Yair Dumont OT     Referring Physician Signature: Germán Zavala MD No Signature is Required for this note.         Post Session Pain  Charge Capture   POC Link  Treatment Note Link  MD Guidelines  Brendenharangel

## 2022-10-30 LAB
CYTOLOGIST CVX/VAG CYTO: NORMAL
CYTOLOGY CVX/VAG DOC THIN PREP: NORMAL
HPV REFLEX: NORMAL
Lab: NORMAL
PATH REPORT.FINAL DX SPEC: NORMAL
STAT OF ADQ CVX/VAG CYTO-IMP: NORMAL

## 2022-11-07 ENCOUNTER — OFFICE VISIT (OUTPATIENT)
Dept: ONCOLOGY | Age: 42
End: 2022-11-07
Payer: COMMERCIAL

## 2022-11-07 VITALS
RESPIRATION RATE: 16 BRPM | WEIGHT: 149.4 LBS | DIASTOLIC BLOOD PRESSURE: 85 MMHG | OXYGEN SATURATION: 100 % | HEART RATE: 93 BPM | SYSTOLIC BLOOD PRESSURE: 112 MMHG | HEIGHT: 70 IN | TEMPERATURE: 98.6 F | BODY MASS INDEX: 21.39 KG/M2

## 2022-11-07 DIAGNOSIS — Z17.0 MALIGNANT NEOPLASM OF CENTRAL PORTION OF RIGHT BREAST IN FEMALE, ESTROGEN RECEPTOR POSITIVE (HCC): ICD-10-CM

## 2022-11-07 DIAGNOSIS — C50.111 MALIGNANT NEOPLASM OF CENTRAL PORTION OF RIGHT BREAST IN FEMALE, ESTROGEN RECEPTOR POSITIVE (HCC): ICD-10-CM

## 2022-11-07 PROCEDURE — 99205 OFFICE O/P NEW HI 60 MIN: CPT | Performed by: INTERNAL MEDICINE

## 2022-11-07 RX ORDER — ACETAMINOPHEN 325 MG/1
650 TABLET ORAL
OUTPATIENT
Start: 2022-11-10

## 2022-11-07 RX ORDER — ALBUTEROL SULFATE 90 UG/1
4 AEROSOL, METERED RESPIRATORY (INHALATION) PRN
OUTPATIENT
Start: 2022-11-10

## 2022-11-07 RX ORDER — EPINEPHRINE 1 MG/ML
0.3 INJECTION, SOLUTION, CONCENTRATE INTRAVENOUS PRN
OUTPATIENT
Start: 2022-11-10

## 2022-11-07 RX ORDER — FAMOTIDINE 10 MG/ML
20 INJECTION, SOLUTION INTRAVENOUS
OUTPATIENT
Start: 2022-11-10

## 2022-11-07 RX ORDER — ONDANSETRON 2 MG/ML
8 INJECTION INTRAMUSCULAR; INTRAVENOUS
OUTPATIENT
Start: 2022-11-10

## 2022-11-07 RX ORDER — DIPHENHYDRAMINE HYDROCHLORIDE 50 MG/ML
50 INJECTION INTRAMUSCULAR; INTRAVENOUS
OUTPATIENT
Start: 2022-11-10

## 2022-11-07 RX ORDER — SODIUM CHLORIDE 9 MG/ML
INJECTION, SOLUTION INTRAVENOUS CONTINUOUS
OUTPATIENT
Start: 2022-11-10

## 2022-11-07 ASSESSMENT — PATIENT HEALTH QUESTIONNAIRE - PHQ9
SUM OF ALL RESPONSES TO PHQ QUESTIONS 1-9: 0
SUM OF ALL RESPONSES TO PHQ QUESTIONS 1-9: 0
2. FEELING DOWN, DEPRESSED OR HOPELESS: 0
1. LITTLE INTEREST OR PLEASURE IN DOING THINGS: 0
SUM OF ALL RESPONSES TO PHQ QUESTIONS 1-9: 0
SUM OF ALL RESPONSES TO PHQ QUESTIONS 1-9: 0
SUM OF ALL RESPONSES TO PHQ9 QUESTIONS 1 & 2: 0

## 2022-11-07 NOTE — PATIENT INSTRUCTIONS
Patient Instructions from Today's Visit    Reason for Visit:  New Patient    Diagnosis Information:  [email or web address]    Plan:  - We will reach out to our Financial Team for approval for lupron. - We will schedule you for a Lupron injection within the next week or so. - We will do Lupron each month and see you back in the office in 3 months. Follow Up:  - as scheduled    Recent Lab Results:  No visits with results within 3 Day(s) from this visit. Latest known visit with results is:   Office Visit on 10/25/2022   Component Date Value Ref Range Status    Diagnosis: 10/25/2022 Comment    Final    Comment: (NOTE)  NEGATIVE FOR INTRAEPITHELIAL LESION OR MALIGNANCY. Specimen adequacy: 10/25/2022 Comment    Final    Comment: (NOTE)  Satisfactory for evaluation. Endocervical and/or squamous metaplastic  cells (endocervical component) are present. Performed by: 10/25/2022 Comment    Final    Comment: (NOTE)  Jonathan Tamayo Cytotechnologist (ASCP)      Note: 10/25/2022 Comment    Final    Comment: (NOTE)  The Pap smear is a screening test designed to aid in the detection of  premalignant and malignant conditions of the uterine cervix. It is  not a diagnostic procedure and should not be used as the sole means  of detecting cervical cancer. Both false-positive and false-negative  reports do occur. Methodology: 10/25/2022 Comment    Final    Comment: (NOTE)  This liquid based ThinPrep(R) pap test was screened with the  use of an image guided system. HPV Reflex 10/25/2022 Comment    Final    Comment: (NOTE)  The HPV DNA reflex criteria were not met with this specimen result  therefore, no HPV testing was performed. No. of containers. Hazel Yoo 01 ThinPrep Vial  Performed At: [facility]  [facility] [address], [place] [id number]  Farrukh Sadler MD TU:[id number]  Performed At: [facility] [id number]  [address] [place], [place] [id number]  Shanice Austin MD :[id number]       Treatment Summary has been discussed and given to patient: n/a    -------------------------------------------------------------------------------------------------------------------  Please call our office at [phone] if you have any  of the following symptoms:   Fever of 100.5 or greater  Chills  Shortness of breath  Swelling or pain in one leg    After office hours an answering service is available and will contact a provider for emergencies or if you are experiencing any of the above symptoms. Patient did express an interest in My Chart. My Chart log in information explained on the after visit summary printout at the [facility] desk.     Kathleen Beltrán MA

## 2022-11-07 NOTE — PROGRESS NOTES
New Patient Abstract    Reason for Referral: Malignant neoplasm of female breast, unspecified estrogen receptor status, unspecified laterality, unspecified site of breast    Referring Provider:  Khadijah Murphy MD    Primary Care Provider: DAVID Weber NP    Family History of Cancer/Hematologic Disorders: Family history is significant for maternal great grandmother with colon cancer. Presenting Symptoms: Palpable abnormality and pain in the left breast    Narrative with recent with Results/Procedures/Biopsies and Dates completed: Ms. Carlos Ware is a 78-year-old white female with a history of Marfan syndrome, GERD, depression, anxiety, esophageal polyp,  section, and tubal ligation. In the Spring of 2021, she developed a palpable abnormality and pain in her left breast. Bilateral digital diagnostic mammogram 3D/2D and targeted right ultrasound on 2021 identified an abnormal calcification in the central and mid upper portion of the right breast.  A hypoechoic lesion was seen on ultrasound corresponding to the dominant collection of calcifications seen on mammogram at the 12 o'clock position areolar margin. Since the calcifications appeared to extend over a larger area than the ultrasound finding, stereotactic biopsy was recommended for further evaluation. 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      Bilateral MRI of breasts with CAD was obtained on 2021 demonstrating extensive suspicious discontiguous clustered ring enhancement involving the right UOQ extending to the midline and just below the nipple laterally mimicking the distribution of the calcifications noted mammographically and consistent with the known DCIS; 1.4 cm separate area of clustered ring enhancement in the right axillary tail, suspicious for additional disease; and left breast with no significant enhancing lesions. She was evaluated in consultation at [facility] breast [address] on 4/15/22. Genetic counseling and testing with 84 gene panel sent on 4/15/2021 was negative for pathogenic mutation. She ultimately decided to proceed with bilateral mastectomy with immediate reconstruction and right sentinel node biopsy which was performed on 5/10/21. Pathology from the left breast showed atypical ductal hyperplasia. Pathology from the right breast showed ER 93%/PR13%/HER-2 positive by FISH, poorly differentiated multifocal (10) invasive ductal adenocarcinoma with the largest lesion measuring 1.7 cm with micrometastasis to 1 sentinel lymph node. Completion axillary dissection and port placement was performed on 5/25/2021 with 11 benign lymph nodes. Pathologic stage IA (pT1c, pN1mi(sn), cM0, G3, ER+, HI+, HER2+). Patient was treated with adjuvant Taxotere, carboplatin, and Herceptin x 6 cycles 6/10/21 - 9/30/2021 with intent for Herceptin up to 1 year of therapy (last dose Herceptin 5/4/22). She was menopausal by hormones, and Arimidex was initiated in March of 2022. She is currently on Lupron injection monthly as well. On 5/11/22 she underwent limited ultrasound of the left breast for further evaluation of a palpable concern in her left chest wall. US imaging suggested that the area of palpable concern corresponded to a rind of scar tissue in the medial central left chest wall and there was no targeted sonographic evidence of malignancy. O 6/9/22 she underwent PET CT which was negative. Expanders were removed and patient decided against implants. On 9/12/22 she underwent limited ultrasound of the right breast after noting UOQ tenderness and a questionable area of palpable concern.  Ultrasound imaging showed no suspicious abnormality is seen in the right mastectomy bed. Patient now presents to [facility], per referral from GYN, for Oncology evaluation and continued management with her history of breast cancer. BILATERAL DIGITAL DIAGNOSTIC MAMMOGRAM 3D/2D AND TARGETED RIGHT ULTRASOUND: 4/1/2021   FINDINGS: The breasts are heterogeneously dense, which may obscure small masses (ACR Breast Density Composition Category C). Digital mammography views were performed including tomosynthesis. This is a diagnostic followup mammogram for palpable abnormality and pain in the left breast. Left mammogram demonstrates some mild asymmetry in the inner portion left breast which is unremarkable on additional views. No significant masses, calcifications or asymmetries are seen. The patient had a palpable abnormality in the upper outer quadrant of the right breast.  Targeted ultrasound was performed and demonstrates no cystic or solid lesion seen. Recommend clinical management and followup of the palpable tender abnormality. Mammogram demonstrates new heterogeneous calcification seen in the central and mid upper portion of the right breast.  The largest component measures approximately 1.2 cm but the overall extension of the calcifications extends over 2.7 cm. These are suspicious for malignancy and biopsy is recommended. Targeted ultrasound was performed in this area. This demonstrates a hypoechoic lesion to be present at the 12 o'clock position areolar margin measuring approximately 2.3 x 1.6 x 0.8 cm. This likely contains the largest dominant component of the calcifications. Since the calcifications appear to extend over a larger area than seen on ultrasound, stereotactic biopsy is recommended. IMPRESSION: SUSPICIOUS FOR MALIGNANCY, ULTRASOUND SUSPICIOUS FOR MALIGNANCY   1.  Additional views of the left breast are unremarkable. Left mammogram is stable. Routine followup is recommended in 1 year.    2.  Unremarkable mammogram and ultrasound for the palpable abnormality in the upper outer quadrant of the right breast.  Recommend clinical management and followup. 3.  There is abnormal calcification seen in the central and mid upper portion of the right breast.  A hypoechoic lesion is seen on ultrasound corresponding to the dominant collection of calcifications seen on mammogram at the 12 o'clock position areolar margin. Since the calcifications appear to extend over a larger area than the ultrasound finding, stereotactic biopsy is recommended for further evaluation. 4.  This report was called to Dr. Terrilyn Gowers office at 3:05 p.m. on 04/01/2021.   5.  This was discussed with the patient at the time of the examination. 6.  The patient will be scheduled for the procedure prior to leaving the radiology department. SURGICAL PATHOLOGY EXAMINATION 4/6/21  FINAL DIAGNOSIS:   Right breast, stereotactic core biopsies:      *   Ductal carcinoma in situ with associated inflammation. *   Negative for invasive carcinoma. *   DCIS histologic grade: Intermediate and high-grade with necrosis. *   Microcalcifications: Present associated with DCIS. *   Hormone receptor status: Pending. Addendum report to follow. *   Cold ischemia time: 5 minutes. *   Overall formalin fixation time: 8 to 10 hours. See comment. COMMENT: Correlation with clinical and mammographic features is suggested. Results conveyed to Dr. Grace Forward on 4/7/2021. BREAST MRI OF BOTH BREASTS- WITH CAD: 4/13/2021   FINDINGS: There is heterogeneous fibroglandular tissue bilaterally with mild background parenchymal enhancement. There is a large area of discontiguous clustered ring enhancement throughout the right UOQ extending to the midline and to just below the nipple laterally mimicking the distribution of the grouped and scattered microcalcifications noted mammographically. There is extension anteriorly to the subareolar region.   Measurements are difficult; however, the process measures at least 3.7 (AP) x 2.7 (TR) x 3.1 (CC) cm. The more mass-like area which was biopsied measures 1.4 (AP) x 1.7 (TR) x 1.4 (CC) cm. Additionally, there is a separate mass-like area of clustered ring enhancement and suspicious kinetics in the right axillary tail measuring 1.4 (AP) x 1.3 (TR) x 1.3 (CC) cm. Left breast demonstrates no significant enhancing lesions. No adenopathy is identified in either the axillae or internal mammary chains. There are small bilateral high signal lesions seen on T2w images consistent with cysts or other benign lesions. IMPRESSION: SUSPICIOUS FOR MALIGNANCY   Extensive suspicious discontiguous clustered ring enhancement involving the right UOQ extending to the midline and just below the nipple laterally mimicking the distribution of the calcifications noted mammographically and consistent with the known DCIS. 1.4 cm separate area of clustered ring enhancement in the right axillary tail is suspicious for additional disease. If breast conservation is contemplated, additional biopsy of this area is recommended. Left breast demonstrates no significant enhancing lesions. SURGICAL PATHOLOGY EXAMINATION 5/10/21  FINAL DIAGNOSIS:   A, B, C, E.  BREAST, LABELED AS RIGHT BREAST 11:30 POSITION PERIAREOLAR, UPPER OUTER QUADRANT, RIGHT BREAST, AND RIGHT ANTERIOR MARGIN 11:30 POSITION, SIMPLE MASTECTOMY AND SEPARATELY SUBMITTED BIOPSY AND MARGINS:      *   MULTIFOCAL INVASIVE DUCTAL ADENOCARCINOMA, POORLY DIFFERENTIATED, ARISING IN A BACKGROUND OF EXTENSIVE HIGH-GRADE DUCTAL CARCINOMA IN SITU. *   LARGEST INVASIVE FOCUS MEASURES 1.7 CM. *   MULTIPLE OTHER FOCI SUSPICIOUS FOR MICROINVASION. *   INVASIVE CARCINOMA IS PRESENT 0.5 CM FROM THE ANTERIOR MARGIN IN THE LATERAL UPPER OUTER QUADRANT. *   DCIS IS PRESENT 0.7 CM FROM THE ANTERIOR MARGIN IN THE LATERAL UPPER OUTER QUADRANT.      *   LYMPHOVASCULAR INVASION IDENTIFIED.       * SEE SYNOPTIC REPORT BELOW. D.  LYMPH NODE, LABELED AS RIGHT AXILLARY SENTINEL NODE, BIOPSY:      *   1 LYMPH NODE WITH METASTATIC ADENOCARCINOMA, 0.45 MM (MICROMETASTASIS). *   NO EXTRANODAL EXTENSION IDENTIFIED. F.  BREAST, LABELED AS LEFT, SIMPLE MASTECTOMY:      *   FOCAL ATYPICAL DUCTAL HYPERPLASIA. *   DENSE STROMAL FIBROSIS, APOCRINE METAPLASIA, AND MILD USUAL DUCTAL HYPERPLASIA. SURGICAL PATHOLOGY EXAMINATION 5/25/21  FINAL DIAGNOSIS:   RIGHT AXILLARY LYMPH NODES, DISSECTION:      *   ELEVEN BENIGN LYMPH NODES NEGATIVE FOR METASTATIC CARCINOMA (0/11). *   FOCAL PRIOR PROCEDURE RELATED CHANGE. *   SEE COMMENT. COMMENT: The final pathologic stage is unchanged ([id number]). See prior excision specimen [id number]. ECHOCARDIOGRAM 6/4/21  NARRATIVE:   · Technically difficult study due to patient's body habitus. · The left ventricular systolic function is normal (55-65%). · Normal left ventricular diastolic function. · The right ventricular systolic function is normal.     ECHOCARDIOGRAM 9/2/21  NARRATIVE:   · The left ventricular systolic function is normal (55-65%). · The right ventricular systolic function is normal.   · Images not suitable for strain analysis. ECHOCARDIOGRAM 11/30/21  NARRATIVE:   · The left ventricular systolic function is low normal (50-54%). · Images not suitable for GLS or 3D. · Follow-up Cardio-Oncology echo 2/28/2022. ECHOCARDIOGRAM 2/28/22  NARRATIVE:   · The left ventricular systolic function is low normal (50-54%). · Images not suitable for GLS or 3D. · Follow-up Cardio-Oncology echo 5/31/2022. LIMITED ULTRASOUND OF LEFT BREAST: 5/11/2022  FINDINGS: Patient presents with an area of palpable concern in the left chest wall. Patient is status post bilateral mastectomies and recently had expanders removed.  Targeted ultrasound of the area of palpable concern demonstrates what appears to be a rind of scar tissue likely left from the prior expanders. IMPRESSION: BENIGN   The area of palpable concern corresponds to a rind of scar tissue in the medial central left chest wall. Clinical follow-up is recommended. There is no targeted sonographic evidence of malignancy. NM PET/CT TUMOR SKULL TO MID THIGH, 6/9/2022   FINDINGS: There is low-grade inflammatory activity involving the bilateral anterior chest wall. However, overall, uptake and distribution of tracer throughout the soft tissues and osseous structures appears within normal limits. More specifically, there is no focal FDG accumulation to suggest residual or recurrent breast cancer. The unenhanced low-dose CT images reveal no anatomic findings of particular interest.   IMPRESSION:   1. Negative exam.     ECHOCARDIOGRAM 6/16/22  NARRATIVE:   · The left ventricular systolic function is normal (55-65%). · Images not suitable for GLS or 3D. · The right ventricular systolic function is normal.   · There is a small pericardial effusion adjacent to the right ventricle present. · Cardio-Oncology monitoring complete. LIMITED ULTRASOUND OF RIGHT BREAST: 9/12/2022   FINDINGS: S/P bilateral mastectomies. Expanders have been removed and she has decided against implants. Today she notes UOQ tenderness, questionable area of palpable concern. US of the right mastectomy bed shows the collapsed capsule that had contained the expander. She is a little tender centrally over this area. The UOQ is palpably normal and sonographically normal. There is no suspicious cystic or solid mass. IMPRESSION: BENIGN   No suspicious abnormality is seen in the right mastectomy bed. Follow-up with ACR/ACS guidelines. Notes from Referring Provider: None    Other Pertinent Information: None    Presented at Tumor Board:  No

## 2022-11-07 NOTE — PROGRESS NOTES
[facility] Hematology and Oncology: Office Visit New Patient H & P    Chief Complaint:    Chief Complaint   Patient presents with    New Patient         History of Present Illness:  Ms. Chriss Chaudhari is a 38 y.o. female who presents today for evaluation regarding breast cancer. In the spring of 2021, she developed a palpable abnormality and pain in her left breast. Bilateral digital diagnostic mammogram 3D/2D and targeted right ultrasound on 4/1/2021 identified an abnormal calcification in the central and mid upper portion of the right breast.  A hypoechoic lesion was seen on ultrasound corresponding to the dominant collection of calcifications seen on mammogram at the 12 o'clock position areolar margin. Since the calcifications appeared to extend over a larger area than the ultrasound finding, stereotactic biopsy was recommended for further evaluation. This was performed on 4/6/21 with pathology revealing DCIS, ER 93%/KS 17% positive. Bilateral MRI of breasts with CAD was obtained on 4/13/2021 demonstrating extensive suspicious discontiguous clustered ring enhancement involving the right UOQ extending to the midline and just below the nipple laterally mimicking the distribution of the calcifications noted mammographically and consistent with the known DCIS; 1.4 cm separate area of clustered ring enhancement in the right axillary tail, suspicious for additional disease; and left breast with no significant enhancing lesions. She was evaluated in consultation at [facility] breast [facility] on 4/15/22. Genetic counseling and testing with 84 gene panel sent on 4/15/2021 was negative for pathogenic mutation. She ultimately decided to proceed with bilateral mastectomy with immediate reconstruction and right sentinel node biopsy which was performed on 5/10/21. Pathology from the left breast showed atypical ductal hyperplasia.  Pathology from the right breast showed ER 93%/PR13%/HER-2 positive by FISH, poorly differentiated multifocal (10) invasive ductal adenocarcinoma with the largest lesion measuring 1.7 cm with micrometastasis to 1 sentinel lymph node. Completion axillary dissection and port placement was performed on 5/25/2021 with 11 benign lymph nodes. She was treated with adjuvant TCH x 6 cycles with completion of 1 year of Herceptin. She was also placed on Arimidex with [facility] agonist. She had some questions about her ongoing endocrine therapy as well as her previous treatments, and presents to [facility] for discussion. Review of Systems:  Constitutional: Negative. HENT: Negative. Eyes: Negative. Respiratory: Negative. Cardiovascular: Negative. Gastrointestinal: Negative. Genitourinary: Negative. Musculoskeletal: Negative. Skin: Negative. Neurological: Negative. Endo/Heme/Allergies: Negative. Psychiatric/Behavioral: Negative. All other systems reviewed and are negative. Allergies   Allergen Reactions    Lidocaine Hcl Anaphylaxis    Penicillins Rash    Iodine Swelling    Clindamycin/Lincomycin Rash     Past Medical History:   Diagnosis Date    Breast cancer in female [facility])     Gastrointestinal disorder     GERD     Past Surgical History:   Procedure Laterality Date    GYN      c section    LYMPH NODE DISSECTION N/A     OTHER SURGICAL HISTORY      double masectomy     No family history on file.   Social History     Socioeconomic History    Marital status:      Spouse name: Not on file    Number of children: Not on file    Years of education: Not on file    Highest education level: Not on file   Occupational History    Not on file   Tobacco Use    Smoking status: Never    Smokeless tobacco: Never   Substance and Sexual Activity    Alcohol use: No    Drug use: No    Sexual activity: Not on file   Other Topics Concern    Not on file   Social History Narrative    Not on file     Social Determinants of Health     Financial Resource Strain: Not on file   Food Insecurity: Not on file   Transportation Needs: Not on file   Physical Activity: Not on file   Stress: Not on file   Social Connections: Not on file   Intimate Partner Violence: Not on file   Housing Stability: Not on file     Current Outpatient Medications   Medication Sig Dispense Refill    leuprolide (LUPRON) 11.25 MG injection Inject 11.25 mg into the muscle once      anastrozole (ARIMIDEX) 1 MG tablet Take 1 mg by mouth daily      loratadine (CLARITIN) 10 MG tablet Take 10 mg by mouth daily      omeprazole (PRILOSEC) 40 MG delayed release capsule Take 40 mg by mouth as needed       No current facility-administered medications for this visit. OBJECTIVE:  /85 Comment: standing  Pulse 93   Temp 98.6 °F (37 °C) (Oral)   Resp 16   Ht 5' 10\" (1.778 m)   Wt 149 lb 6.4 oz (67.8 kg)   SpO2 100%   BMI 21.44 kg/m²     Physical Exam:  Constitutional: Well developed, well nourished female in no acute distress, sitting comfortably on the examination table. HEENT: Normocephalic and atraumatic. Sclerae anicteric. Neck supple without JVD. No thyromegaly present. Lymph node   No palpable submandibular, cervical, supraclavicular lymph nodes. Skin Warm and dry. No bruising and no rash noted. No erythema. No pallor. Respiratory Lungs are clear to auscultation bilaterally without wheezes, rales or rhonchi, normal air exchange without accessory muscle use. CVS Normal rate, regular rhythm and normal S1 and S2. No murmurs, gallops, or rubs. Neuro Grossly nonfocal with no obvious sensory or motor deficits. MSK Normal range of motion in general.  No edema and no tenderness. Psych Appropriate mood and affect. Labs:  No results found for this or any previous visit (from the past 24 hour(s)). Imaging:  No results found. ASSESSMENT:   Diagnosis Orders   1.  Malignant neoplasm of central portion of right breast in female, estrogen receptor positive [facility])          Patient Active Problem List   Diagnosis    Breast pain, left PLAN:  Lab studies were personally reviewed. Pertinent old records were reviewed. Breast cancer: 2021, multifocal with largest tumor 1.7 cm, 1 lymph node with micrometastasis, ER/NV/HER2 positive. S/p mastectomy, ALND, adjuvant TCH followed by Herceptin, now on endocrine therapy with Arimidex and Lupron. I discussed the pathophysiology and natural history of breast cancer with Ms. Iqbal. She completed appropriate adjuvant therapy with [facility], she did inquire about the potential benefit of pertuzumab adjuvantly, in [facility] patients who were node positive benefited from addition of P to [facility], with the micrometastasis it is unclear if she would have been in that category, it is certainly not unreasonable to have omitted this. We also discussed the choices for adjuvant endocrine therapy including ongoing OFS+AI vs tamoxifen, she is willing to continue OFS and AI given the expected incremental benefit. We will continue monthly Lupron at our facility and follow-up in 3 months. All questions were asked and answered to the best of my ability. In all, I spent 60 minutes in the care of Ms. Leonarda Cano today, over 50% of which was in direct counseling and coordination of care.           Mat Valaedz MD, MD  [facility] Hematology and Oncology  [address] [place] [address]  Office : [phone]  Fax : [phone]

## 2022-11-08 ENCOUNTER — HOSPITAL ENCOUNTER (OUTPATIENT)
Dept: PHYSICAL THERAPY | Age: 42
Setting detail: RECURRING SERIES
Discharge: HOME | End: 2022-11-11
Payer: COMMERCIAL

## 2022-11-08 PROCEDURE — 97535 SELF CARE MNGMENT TRAINING: CPT

## 2022-11-08 PROCEDURE — 97140 MANUAL THERAPY 1/> REGIONS: CPT

## 2022-11-08 NOTE — PROGRESS NOTES
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 Referring Physician:  Hi Muhammad MD MD Orders:  OT Eval and Treat   Date of Onset:  No data recorded   Allergies:  Lidocaine hcl, Penicillins, Iodine, and Clindamycin/lincomycin  Restrictions/Precautions:    No data recorded  No data recorded  Medications Last Reviewed:  2022     Interventions Planned: (Treatment may consist of any combination of the following)  Current Treatment Recommendations: ROM; Manual Therapy:  MLD; Modalities; Patient/Caregiver education & training; Safety education & training; Strengthening     Subjective Comments: \"I wore my bandage all day at [facility]. \"     Initial:      2/10 Post Session:      2/10  Medications Last Reviewed:  2022  Updated Objective Findings:  None Today  Treatment   MANUAL THERAPY: (30 minutes):   Manual Lymph Drainage: Completed both supine and side laying. Towel to cover up for comfort. Scar massage on mastectomy chest and axillary scars. Fibrotic tissue superior of Mastectomy scar.    Lymph Nodes:    Cervical Supraclavicular Axillary Abdominal Inguinal Popliteal Antecubital   RIGHT [x]     [x]     [x]     [x]     [x]     []     [x]       LEFT [x]     [x]     [x]     [x]     []     []     []         Anastamoses:   Axillo-axillary Inguino-inguinal Axillo-inguinal Inguino-axillary   ANTERIOR [x]     []     [x]     []       POSTERIOR [x]     []     [x]     []       RIGHT [x]     []     [x]     []       LEFT []     []     []     []         Limbs:   []    RUE     []    LUE     [x]    RLE    []    LLE    SELF CARE: (30 minutes):   Eucerin for skin care. Polyester for base liner. Artiflex around wrist and elbow. 3 bandages used up arm. No finger wraps. Rosidal foam on forearm. Education to remove if painful or unexplained SOB. Treatment/Session Summary:    Treatment Assessment:  Good first tx. Discussed compression at night. Possible alternatives to arm sleeve. Communication/Consultation:  None today  Equipment provided today:  3 bandages, rosidal foam, liner  Recommendations/Intent for next treatment session: Next visit will focus on CDT and MLD. Camilo Rayo     Total Treatment Billable Duration: 60 minutes  OT Individual Minutes  Time In: 0800  Time Out: 0900  Minutes: [address] [place] [address]    Post Session Pain  Charge Capture   POC Link  Treatment Note Link  MD Guidelines  MyChart    Future Appointments   Date Time Provider Mayte Tan   11/10/2022  1:00 PM Julia Flicker, OT [facility]   11/14/2022  2:00 PM SS [facility]   11/15/2022  9:00 AM Grenville Flicker, OT [facility]   11/17/2022  9:00 AM Julia Flicker, OT [facility]   11/22/2022  8:00 AM Julia Flicker, OT [facility]   11/29/2022  8:00 AM Julia Flicker, OT [facility]   12/1/2022  8:00 AM Julia Flicker, OT [facility]   12/14/2022  3:30 PM SS [facility]   1/16/2023  3:30 PM SS [facility]   2/15/2023  1:50 PM [facility]   2/15/2023  2:30 PM Mi Ellsworth MD [facility] AMB   2/15/2023  3:30 PM SS [facility]   10/30/2023  9:15 AM Fady Falk MD [facility] AMB

## 2022-11-10 ENCOUNTER — HOSPITAL ENCOUNTER (OUTPATIENT)
Dept: PHYSICAL THERAPY | Age: 42
Setting detail: RECURRING SERIES
Discharge: HOME | End: 2022-11-13
Payer: COMMERCIAL

## 2022-11-10 PROCEDURE — 97140 MANUAL THERAPY 1/> REGIONS: CPT

## 2022-11-10 PROCEDURE — 97535 SELF CARE MNGMENT TRAINING: CPT

## 2022-11-10 NOTE — PROGRESS NOTES
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 Referring Physician:  Clara Mckeon MD MD Orders:  OT Eval and Treat   Date of Onset:  No data recorded   Allergies:  Lidocaine hcl, Penicillins, Iodine, and Clindamycin/lincomycin  Restrictions/Precautions:    No data recorded  No data recorded  Medications Last Reviewed:  11/10/2022     Interventions Planned: (Treatment may consist of any combination of the following)  Current Treatment Recommendations: ROM; Manual Therapy:  MLD; Modalities; Patient/Caregiver education & training; Safety education & training; Strengthening     Subjective Comments: \"After wearing that wrap my compression sleeve is easier to wear. \"     Initial:      2/10 Post Session:      2/10  Medications Last Reviewed:  11/10/2022  Updated Objective Findings:  None Today  Treatment   MANUAL THERAPY: (45 minutes):   Manual Lymph Drainage: Completed both supine and side laying. Towel to cover up for comfort. Scar massage on mastectomy chest and axillary scars. Fibrotic tissue superior of Mastectomy scar. Educated about possible pump referrals.   Lymph Nodes:    Cervical Supraclavicular Axillary Abdominal Inguinal Popliteal Antecubital   RIGHT [x]     [x]     [x]     [x]     [x]     []     [x]       LEFT [x]     [x]     [x]     [x]     []     []     [] Anastamoses:   Axillo-axillary Inguino-inguinal Axillo-inguinal Inguino-axillary   ANTERIOR [x]     []     [x]     []       POSTERIOR [x]     []     [x]     []       RIGHT [x]     []     [x]     []       LEFT []     []     []     []         Limbs:   []    RUE     []    LUE     [x]    RLE    []    LLE    SELF CARE: (15 minutes):   Lymph pad educated for night to wear inside sports bra and possibly using ace wrap to secure. Treatment/Session Summary:    Treatment Assessment:  Good first tx. Discussed compression at night. Possible alternatives to arm sleeve and compression pump later on. Communication/Consultation:  None today  Equipment provided today:  3 bandages, rosidal foam, liner  Recommendations/Intent for next treatment session: Next visit will focus on CDT and MLD. Nay Poole     Total Treatment Billable Duration: 60 minutes  OT Individual Minutes  Time In: 1000  Time Out: 1100  Minutes: [address] [place] [address], OT    Post Session Pain  Charge Capture   POC Link  Treatment Note Link  MD Guidelines  MyChart    Future Appointments   Date Time Provider Mayte Britney   11/14/2022  2:00 PM SS [facility] [address] [place] [address]   11/15/2022  9:00 AM Gi Frees, OT [facility]   11/17/2022  9:00 AM Gi Frees, OT [facility]   11/22/2022  8:00 AM Gi Frees, OT [facility]   11/29/2022  8:00 AM Gi Frees, OT [facility]   12/1/2022  8:00 AM Gi Frees, OT [facility]   12/14/2022  3:30 PM SS [facility]   1/16/2023  3:30 PM SS [facility]   2/15/2023  1:50 PM [address] [place] [address] OUTREACH INSURANCE [facility] [address] [place] [address]   2/15/2023  2:30 PM Johnnie Terrazas MD [facility] AMB   2/15/2023  3:30 PM SS [facility]   10/30/2023  9:15 AM Renita Eduardo MD [facility] G[facility] AMB

## 2022-11-15 ENCOUNTER — APPOINTMENT (OUTPATIENT)
Dept: PHYSICAL THERAPY | Age: 42
End: 2022-11-15
Payer: COMMERCIAL

## 2022-11-15 ENCOUNTER — HOSPITAL ENCOUNTER (OUTPATIENT)
Dept: INFUSION THERAPY | Age: 42
Discharge: HOME | End: 2022-11-15
Payer: COMMERCIAL

## 2022-11-15 VITALS
HEART RATE: 80 BPM | DIASTOLIC BLOOD PRESSURE: 74 MMHG | RESPIRATION RATE: 16 BRPM | BODY MASS INDEX: 21.52 KG/M2 | TEMPERATURE: 98.4 F | SYSTOLIC BLOOD PRESSURE: 121 MMHG | WEIGHT: 150 LBS | OXYGEN SATURATION: 100 %

## 2022-11-15 DIAGNOSIS — C50.111 MALIGNANT NEOPLASM OF CENTRAL PORTION OF RIGHT BREAST IN FEMALE, ESTROGEN RECEPTOR POSITIVE (HCC): Primary | ICD-10-CM

## 2022-11-15 DIAGNOSIS — Z17.0 MALIGNANT NEOPLASM OF CENTRAL PORTION OF RIGHT BREAST IN FEMALE, ESTROGEN RECEPTOR POSITIVE (HCC): Primary | ICD-10-CM

## 2022-11-15 PROCEDURE — 6360000002 HC RX W HCPCS: Performed by: INTERNAL MEDICINE

## 2022-11-15 PROCEDURE — 96402 CHEMO HORMON ANTINEOPL SQ/IM: CPT

## 2022-11-15 RX ORDER — ALBUTEROL SULFATE 90 UG/1
4 AEROSOL, METERED RESPIRATORY (INHALATION) PRN
OUTPATIENT
Start: 2022-12-13

## 2022-11-15 RX ORDER — SODIUM CHLORIDE 9 MG/ML
INJECTION, SOLUTION INTRAVENOUS CONTINUOUS
OUTPATIENT
Start: 2022-12-13

## 2022-11-15 RX ORDER — DIPHENHYDRAMINE HYDROCHLORIDE 50 MG/ML
50 INJECTION INTRAMUSCULAR; INTRAVENOUS
OUTPATIENT
Start: 2022-12-13

## 2022-11-15 RX ORDER — EPINEPHRINE 1 MG/ML
0.3 INJECTION, SOLUTION, CONCENTRATE INTRAVENOUS PRN
OUTPATIENT
Start: 2022-12-13

## 2022-11-15 RX ORDER — ACETAMINOPHEN 325 MG/1
650 TABLET ORAL
OUTPATIENT
Start: 2022-12-13

## 2022-11-15 RX ORDER — ONDANSETRON 2 MG/ML
8 INJECTION INTRAMUSCULAR; INTRAVENOUS
OUTPATIENT
Start: 2022-12-13

## 2022-11-15 RX ADMIN — LEUPROLIDE ACETATE 3.75 MG: KIT at 16:06

## 2022-11-15 NOTE — PROGRESS NOTES
Patient arrived to [facility] for [facility]. Assessment completed. No needs voiced at this time. Patient tolerated injection well and is aware of next appointment on 12/14/2022 @1500. Patient discharged ambulatory.

## 2022-11-17 ENCOUNTER — HOSPITAL ENCOUNTER (OUTPATIENT)
Dept: PHYSICAL THERAPY | Age: 42
Setting detail: RECURRING SERIES
Discharge: HOME | End: 2022-11-20
Payer: COMMERCIAL

## 2022-11-17 PROCEDURE — 97140 MANUAL THERAPY 1/> REGIONS: CPT

## 2022-11-17 NOTE — PROGRESS NOTES
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 Referring Physician:  Vineet Ruano MD MD Orders:  OT Eval and Treat   Date of Onset:  No data recorded   Allergies:  Lidocaine hcl, Penicillins, Iodine, and Clindamycin/lincomycin  Restrictions/Precautions:    No data recorded  No data recorded  Medications Last Reviewed:  2022     Interventions Planned: (Treatment may consist of any combination of the following)  Current Treatment Recommendations: ROM; Manual Therapy:  MLD; Modalities; Patient/Caregiver education & training; Safety education & training; Strengthening     Subjective Comments: \"After wearing that wrap my compression sleeve is easier to wear. \"     Initial:      2/10 Post Session:      2/10  Medications Last Reviewed:  2022  Updated Objective Findings:  None Today  Treatment   MANUAL THERAPY: (60 minutes):   Manual Lymph Drainage: Completed both supine and side laying. Towel to cover up for comfort. Scar massage on mastectomy chest and axillary scars. Fibrotic tissue superior of Mastectomy scar. Educated about possible pump referrals. Myofascial massage and release around painful cording on right elbow with good results.    Lymph Nodes:    Cervical Supraclavicular Axillary Abdominal Inguinal Popliteal Antecubital   RIGHT [x]     [x]     [x]     [x] [x]     []     [x]       LEFT [x]     [x]     [x]     [x]     []     []     []         Anastamoses:   Axillo-axillary Inguino-inguinal Axillo-inguinal Inguino-axillary   ANTERIOR [x]     []     [x]     []       POSTERIOR [x]     []     [x]     []       RIGHT [x]     []     [x]     []       LEFT []     []     []     []         Limbs:   []    RUE     []    LUE     [x]    RLE    []    LLE    SELF CARE: (0 minutes):   Lymph pad educated for night to wear inside sports bra and possibly using ace wrap to secure. Treatment/Session Summary:    Treatment Assessment:  Patient still interested in night compression night and will get rx for patient to use at a clinic that can bill her insurance. Good response to MLD. Communication/Consultation:  None today  Equipment provided today:  0  Recommendations/Intent for next treatment session: Next visit will focus on CDT and MLD. [facility]     Total Treatment Billable Duration: 60 minutes  OT Individual Minutes  Time In: 0900  Time Out: 1000  Minutes: [address], OT    Post Session Pain  Charge Capture   POC Link  Treatment Note Link  MD Guidelines  MyChart    Future Appointments   Date Time Provider Mayte Britney   11/22/2022  8:00 AM Jamey Schmidt OT [facility]   11/29/2022  8:00 AM CLAUDIA MorenoOST [facility]   12/1/2022  8:00 AM Jamey Schmidt, OT [facility]   12/14/2022  3:30 [facility]   1/16/2023  3:30 PM SS [facility]   2/15/2023  1:50 PM [facility] OUTREACH INSURANCE [facility]   2/15/2023  2:30 PM Kimani Mars MD [facility] AMB   2/15/2023  3:30 PM SS [facility]   10/30/2023  9:15 AM Alexandro Bear MD [facility] G[facility] AMB

## 2022-11-22 ENCOUNTER — HOSPITAL ENCOUNTER (OUTPATIENT)
Dept: PHYSICAL THERAPY | Age: 42
Setting detail: RECURRING SERIES
Discharge: HOME | End: 2022-11-25
Payer: COMMERCIAL

## 2022-11-22 PROCEDURE — 97140 MANUAL THERAPY 1/> REGIONS: CPT

## 2022-11-22 NOTE — PROGRESS NOTES
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 Referring Physician:  Robb Velásquez MD MD Orders:  OT Eval and Treat   Date of Onset:  No data recorded   Allergies:  Lidocaine hcl, Penicillins, Iodine, and Clindamycin/lincomycin  Restrictions/Precautions:    No data recorded  No data recorded  Medications Last Reviewed:  2022     Interventions Planned: (Treatment may consist of any combination of the following)  Current Treatment Recommendations: ROM; Manual Therapy:  MLD; Modalities; Patient/Caregiver education & training; Safety education & training; Strengthening     Subjective Comments: \"I think I need another arm sleeve. \"     Initial:      2/10 Post Session:      2/10  Medications Last Reviewed:  2022  Updated Objective Findings:  None Today  Treatment   MANUAL THERAPY: (60 minutes):   Manual Lymph Drainage: Completed both supine and side laying. Towel to cover up for comfort. Scar massage on mastectomy chest and axillary scars. Fibrotic tissue superior of Mastectomy scar. Educated about possible pump referrals. Myofascial massage and release around painful cording on right elbow with good results.    Lymph Nodes:    Cervical Supraclavicular Axillary Abdominal Inguinal Popliteal Antecubital   RIGHT [x]     [x]     [x]     [x]     [x]     []     [x]       LEFT [x]     [x]     [x]     [x]     []     []     []         Anastamoses:   Axillo-axillary Inguino-inguinal Axillo-inguinal Inguino-axillary   ANTERIOR [x]     []     [x]     []       POSTERIOR [x]     []     [x]     []       RIGHT [x]     []     [x]     []       LEFT []     []     []     []         Limbs:   []    RUE     []    LUE     [x]    RLE    []    LLE    SELF CARE: (0 minutes):   Lymph pad educated for night to wear inside sports bra and possibly using ace wrap to secure. Treatment/Session Summary:    Treatment Assessment:  Faxed night time garment to  for measuring and evaluation for tribute night. Patient also ordered Medi Comfort Sleeve Sive 2, sand, regular, standard. Communication/Consultation:  None today  Equipment provided today:  0  Recommendations/Intent for next treatment session: Next visit will focus on CDT and MLD. Monica Sutton     Total Treatment Billable Duration: 60 minutes  OT Individual Minutes  Time In: 0800  Time Out: 0900  Minutes: [address] [place] [address]    Post Session Pain  Charge Capture   POC Link  Treatment Note Link  MD Guidelines  MyChart    Future Appointments   Date Time Provider Mayte Tan   11/29/2022  8:00 AM Darshana Balls, OT [facility]   12/1/2022  8:00 AM Darshana Balls, OT [facility]   12/14/2022  3:30 PM SS [facility]   1/16/2023  3:30 PM  [facility]   2/15/2023  1:50 PM [facility] OUTREACH INSURANCE [facility]   2/15/2023  2:30 PM Gonzalez Tay MD [facility] AMB   2/15/2023  3:30 PM  [facility]   10/30/2023  9:15 AM Pipo Mills MD [facility] AMB

## 2022-11-29 ENCOUNTER — HOSPITAL ENCOUNTER (OUTPATIENT)
Dept: PHYSICAL THERAPY | Age: 42
Setting detail: RECURRING SERIES
Discharge: HOME | End: 2022-12-02
Payer: COMMERCIAL

## 2022-11-29 PROCEDURE — 97140 MANUAL THERAPY 1/> REGIONS: CPT

## 2022-11-29 NOTE — PROGRESS NOTES
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 Referring Physician:  Vineet Ruano MD MD Orders:  OT Eval and Treat   Date of Onset:  No data recorded   Allergies:  Lidocaine hcl, Penicillins, Iodine, and Clindamycin/lincomycin  Restrictions/Precautions:    No data recorded  No data recorded  Medications Last Reviewed:  2022     Interventions Planned: (Treatment may consist of any combination of the following)  Current Treatment Recommendations: ROM; Manual Therapy:  MLD; Modalities; Patient/Caregiver education & training; Safety education & training; Strengthening     Subjective Comments: \"I think I need another arm sleeve. \"     Initial:      2/10 Post Session:      2/10  Medications Last Reviewed:  2022  Updated Objective Findings:  None Today  Treatment   MANUAL THERAPY: (60 minutes):   Manual Lymph Drainage: Completed both supine and side laying. Towel to cover up for comfort. Scar massage on mastectomy chest and axillary scars from lymph node dissection. Fibrotic tissue superior of Mastectomy scar. F/u with pump companies. Myofascial massage and release around painful cording on right elbow with good results.    Lymph Nodes:    Cervical Supraclavicular Axillary Abdominal Inguinal Popliteal Antecubital   RIGHT [x]     [x]     [x]     [x]     [x]     []     [x] LEFT [x]     [x]     [x]     [x]     []     []     []         Anastamoses:   Axillo-axillary Inguino-inguinal Axillo-inguinal Inguino-axillary   ANTERIOR [x]     []     [x]     []       POSTERIOR [x]     []     [x]     []       RIGHT [x]     []     [x]     []       LEFT []     []     []     []         Limbs:   []    RUE     []    LUE     [x]    RLE    []    LLE    SELF CARE: (0 minutes):   Lymph pad educated for night to wear inside sports bra and possibly using ace wrap to secure. PRETREATMENT AFFECTED LIMB(s): RIGHT UPPER EXTREMITY       Date:   10/27/22 11/29/22              Right / Left                  Axilla    []      []                  3 inches    []      [] R29  L28  R27  L26.5              Elbow    []      []  L22  R23  L22  R22              6 inches    []      []                  3 inches    []      []                  Wrist    []      []                  Palm    []      []                       []      []               Measurements are taken in centimeters:  2.54 cm = 1 inch       Treatment/Session Summary:    Treatment Assessment: Good reduction in lateral chest volume after MLD. Communication/Consultation:  None today  Equipment provided today:  0  Recommendations/Intent for next treatment session: Next visit will focus on CDT and MLD. Layne Muhammad     Total Treatment Billable Duration: 60 minutes  OT Individual Minutes  Time In: 0800  Time Out: 0900  Minutes: [address] [place] [address]    Post Session Pain  Charge Capture   POC Link  Treatment Note Link  MD Guidelines  MyChart    Future Appointments   Date Time Provider Mayte Tan   12/1/2022  8:00 AM Shaheed Hsu, OT [facility]   12/5/2022  8:00 AM Shaheed Hsu, OT [facility]   12/12/2022  8:00 AM Shaheed Hsu, OT [facility]   12/14/2022  3:30 PM SS [facility]   12/20/2022  8:00 AM Pete Hou, OT [facility]   1/5/2023  9:00 AM Shaheed Hsu, OT [facility]   1/16/2023  3:30 PM SS [facility]   2/15/2023  1:50 PM GCC OUTREACH INSURANCE [facility]   2/15/2023  2:30 PM Glenora Severin, MD [facility]   2/15/2023  3:30 PM [facility]   10/30/2023  9:15 AM Héctor Lynn MD [facility] AMB

## 2022-12-01 ENCOUNTER — HOSPITAL ENCOUNTER (OUTPATIENT)
Dept: PHYSICAL THERAPY | Age: 42
Setting detail: RECURRING SERIES
Discharge: HOME | End: 2022-12-04
Payer: COMMERCIAL

## 2022-12-01 PROCEDURE — 97140 MANUAL THERAPY 1/> REGIONS: CPT

## 2022-12-01 NOTE — PROGRESS NOTES
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 Referring Physician:  Rose Anderson MD MD Orders:  OT Eval and Treat   Date of Onset:  No data recorded   Allergies:  Lidocaine hcl, Penicillins, Iodine, and Clindamycin/lincomycin  Restrictions/Precautions:    No data recorded  No data recorded  Medications Last Reviewed:  2022     Interventions Planned: (Treatment may consist of any combination of the following)  Current Treatment Recommendations: ROM; Manual Therapy:  MLD; Modalities; Patient/Caregiver education & training; Safety education & training; Strengthening     Subjective Comments: \"I got that arm sleeve, feels thicker but should work. \"     Initial:      2/10 Post Session:      2/10  Medications Last Reviewed:  2022  Updated Objective Findings:  None Today  Treatment   MANUAL THERAPY: (60 minutes):   Manual Lymph Drainage: Completed both supine and side laying. Towel to cover up for comfort. Scar massage on mastectomy chest and axillary scars from lymph node dissection. Fibrotic tissue superior of Mastectomy scar. F/u with pump companies. Myofascial massage and release around painful cording on right elbow with good results. PROM with joint mobilization of GH and scapula. Good ROM stretch at pectoral minor area.    Lymph Nodes:    Cervical Supraclavicular Axillary Abdominal Inguinal Popliteal Antecubital   RIGHT [x]     [x]     [x]     [x]     [x]     []     [x]       LEFT [x]     [x]     [x]     [x]     []     []     []         Anastamoses:   Axillo-axillary Inguino-inguinal Axillo-inguinal Inguino-axillary   ANTERIOR [x]     []     [x]     []       POSTERIOR [x]     []     [x]     []       RIGHT [x]     []     [x]     []       LEFT []     []     []     []         Limbs:   []    RUE     []    LUE     [x]    RLE    []    LLE    SELF CARE: (0 minutes):   Lymph pad educated for night to wear inside sports bra and possibly using ace wrap to secure. PRETREATMENT AFFECTED LIMB(s): RIGHT UPPER EXTREMITY       Date:   10/27/22 11/29/22              Right / Left                  Axilla    []      []                  3 inches    []      [] R29  L28  R27  L26.5              Elbow    []      []  L22  R23  L22  R22              6 inches    []      []                  3 inches    []      []                  Wrist    []      []                  Palm    []      []                       []      []               Measurements are taken in centimeters:  2.54 cm = 1 inch       Treatment/Session Summary:    Treatment Assessment: Good reduction in lateral chest volume after MLD. Good initial ROM and pain reduction with joint mobilization of right shoulder. Communication/Consultation:  None today  Equipment provided today:  0  Recommendations/Intent for next treatment session: Next visit will focus on CDT and MLD. Camilo Rayo     Total Treatment Billable Duration: 60 minutes  OT Individual Minutes  Time In: 0800  Time Out: 0900  Minutes: [address] [place] [address]    Post Session Pain  Charge Capture   POC Link  Treatment Note Link  MD Guidelines  MyChart    Future Appointments   Date Time Provider Mayte Tan   12/5/2022  8:00 AM CLAUDIA Eller   12/12/2022  8:00 AM CLAUDIA Eller   12/14/2022  3:30 PM [facility]   12/20/2022  8:00 AM Ashwin Cartagena Jocelyne Wilder [facility]   1/5/2023  9:00 AM Krystle Swift OT [facility]   1/16/2023  3:30 PM [facility]   2/15/2023  1:50 PM [facility] OUTREACH INSURANCE [facility]   2/15/2023  2:30 PM Bethel Murray MD [facility] AMB   2/15/2023  [facility]:30 [facility]   10/30/2023  9:15 AM Vy Cano MD [facility] AMB

## 2022-12-05 ENCOUNTER — HOSPITAL ENCOUNTER (OUTPATIENT)
Dept: PHYSICAL THERAPY | Age: 42
Setting detail: RECURRING SERIES
Discharge: HOME | End: 2022-12-08
Payer: COMMERCIAL

## 2022-12-05 PROCEDURE — 97140 MANUAL THERAPY 1/> REGIONS: CPT

## 2022-12-05 NOTE — PROGRESS NOTES
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 Referring Physician:  Gabrielle Welch MD MD Orders:  OT Eval and Treat   Date of Onset:  No data recorded   Allergies:  Lidocaine hcl, Penicillins, Iodine, and Clindamycin/lincomycin  Restrictions/Precautions:    No data recorded  No data recorded  Medications Last Reviewed:  2022     Interventions Planned: (Treatment may consist of any combination of the following)  Current Treatment Recommendations: ROM; Manual Therapy:  MLD; Modalities; Patient/Caregiver education & training; Safety education & training; Strengthening     Subjective Comments: \"I got that arm sleeve, feels thicker but should work. \"     Initial:      2/10 Post Session:      2/10  Medications Last Reviewed:  2022  Updated Objective Findings:  None Today  Treatment   MANUAL THERAPY: (60 minutes):   Manual Lymph Drainage: Completed both supine and side laying. Towel to cover up for comfort. Scar massage on mastectomy chest and axillary scars from lymph node dissection. Fibrotic tissue superior of Mastectomy scar. F/u with pump companies. Left mastectomy scar with some edema around lateral portion. MLD around scar and to add compression around that side at night as well. Myofascial massage and release around painful cording on right elbow with good results. PROM with joint mobilization of GH and scapula. Good ROM stretch at pectoral minor area. Lymph Nodes:    Cervical Supraclavicular Axillary Abdominal Inguinal Popliteal Antecubital   RIGHT [x]     [x]     [x]     [x]     [x]     []     [x]       LEFT [x]     [x]     [x]     [x]     []     []     []         Anastamoses:   Axillo-axillary Inguino-inguinal Axillo-inguinal Inguino-axillary   ANTERIOR [x]     []     [x]     []       POSTERIOR [x]     []     [x]     []       RIGHT [x]     []     [x]     []       LEFT []     []     []     []         Limbs:   []    RUE     []    LUE     [x]    RLE    []    LLE    SELF CARE: (0 minutes):   Lymph pad educated for night to wear inside sports bra and possibly using ace wrap to secure. PRETREATMENT AFFECTED LIMB(s): RIGHT UPPER EXTREMITY       Date:   10/27/22 11/29/22              Right / Left                  Axilla    []      []                  3 inches    []      [] R29  L28  R27  L26.5              Elbow    []      []  L22  R23  L22  R22              6 inches    []      []                  3 inches    []      []                  Wrist    []      []                  Palm    []      []                       []      []               Measurements are taken in centimeters:  2.54 cm = 1 inch       Treatment/Session Summary:    Treatment Assessment: Left mastectomy scar with some edema around lateral portion. MLD around scar and to add compression around that side at night as well. May need to purchase night time lateral chest compression if edema persists. Communication/Consultation:  None today  Equipment provided today:  0  Recommendations/Intent for next treatment session: Next visit will focus on CDT and MLD. Isidra Perez     Total Treatment Billable Duration: 60 minutes  OT Individual Minutes  Time In: 0800  Time Out: 0900  Minutes: 60    Mario Lamas, OT    Post Session Pain  Charge Capture   POC Link  Treatment Note Link  MD Guidelines  MyChart    Future Appointments Date Time Provider Mayte Tan   12/12/2022  8:00 AM Aga Grimm, OT [facility]   12/14/2022  3:30 PM [facility]   12/20/2022  8:00 AM Keila Alexandra, OT [facility]   1/5/2023  9:00 AM Aga Grimm, OT [facility]   1/16/2023  3:30 PM SS [facility]   2/15/2023  1:50 PM [facility] OUTREACH INSURANCE [facility]   2/15/2023  2:30 PM Vero Morse MD [facility] AMB   2/15/2023  3:30 PM  [facility]   10/30/2023  9:15 AM Reyes Como, MD [facility] AMB

## 2022-12-12 ENCOUNTER — TELEPHONE (OUTPATIENT)
Dept: ONCOLOGY | Age: 42
End: 2022-12-12

## 2022-12-12 ENCOUNTER — HOSPITAL ENCOUNTER (OUTPATIENT)
Dept: PHYSICAL THERAPY | Age: 42
Setting detail: RECURRING SERIES
Discharge: HOME | End: 2022-12-15
Payer: COMMERCIAL

## 2022-12-12 PROCEDURE — 97140 MANUAL THERAPY 1/> REGIONS: CPT

## 2022-12-12 NOTE — PROGRESS NOTES
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 Referring Physician:  Angi Greenberg MD MD Orders:  OT Eval and Treat   Date of Onset:  No data recorded   Allergies:  Lidocaine hcl, Penicillins, Iodine, and Clindamycin/lincomycin  Restrictions/Precautions:    No data recorded  No data recorded  Medications Last Reviewed:  2022     Interventions Planned: (Treatment may consist of any combination of the following)  Current Treatment Recommendations: ROM; Manual Therapy:  MLD; Modalities; Patient/Caregiver education & training; Safety education & training; Strengthening     Subjective Comments: \"I have an appointment for the nighttime sleep sleeve later with Case. \"     Initial:      2/10 Post Session:      2/10  Medications Last Reviewed:  2022  Updated Objective Findings:  None Today  Treatment   MANUAL THERAPY: (60 minutes):   Manual Lymph Drainage: Completed both supine and side laying. Towel to cover up for comfort. Scar massage on mastectomy chest and axillary scars from lymph node dissection. Fibrotic tissue superior of Mastectomy scar. F/u with pump companies. Left mastectomy scar with some edema around lateral portion. MLD around scar and to add compression around that side at night as well.    Myofascial massage and release around painful cording on right elbow with good results. Lymph Nodes:    Cervical Supraclavicular Axillary Abdominal Inguinal Popliteal Antecubital   RIGHT [x]     [x]     [x]     [x]     [x]     []     [x]       LEFT [x]     [x]     [x]     [x]     []     []     []         Anastamoses:   Axillo-axillary Inguino-inguinal Axillo-inguinal Inguino-axillary   ANTERIOR [x]     []     [x]     []       POSTERIOR [x]     []     [x]     []       RIGHT [x]     []     [x]     []       LEFT []     []     []     []         Limbs:   []    RUE     []    LUE     [x]    RLE    []    LLE    SELF CARE: (0 minutes):   Lymph pad educated for night to wear inside sports bra and possibly using ace wrap to secure. PRETREATMENT AFFECTED LIMB(s): RIGHT UPPER EXTREMITY       Date:   10/27/22 11/29/22              Right / Left                  Axilla    []      []                  3 inches    []      [] R29  L28  R27  L26.5              Elbow    []      []  L22  R23  L22  R22              6 inches    []      []                  3 inches    []      []  no edema  no edema              Wrist    []      []  no edema  no edema              Palm    []      []                       []      []               Measurements are taken in centimeters:  2.54 cm = 1 inch     Short-Term Functional Goals: Time Frame: 30 days  1. The patient/caregiver will verbalize and demonstrate understanding of lymphedema precautions. --GOAL MET 12/12/22    2. Patient will be independent with skin care regimen to decrease risk of cellulitis. -GOAL MET 12/12/22    3. The patient/caregiver will be independent at donning and doffing right upper extremity compression sleeve and application of chest compression. -GOAL MET 12/12/22    4. The patient/caregiver will be independent with self-manual lymph drainage techniques and show decrease in limb volume.  -Progressing 12/12/22    5. Patient will be independent in lymphatic exercises. -Progressing 12/12/22    6.  Patient will reduce painful cording to 1 out of 10 in elbow. Upgrade to no pain. -12/12/22   7. Patient will increase length of time wearing day time compression to 8 hours a day. -GOAL MET 12/12/22    \  Discharge Goals: Time Frame: 90 days  1. Patient's right upper extremity circumferential measurements will decrease on volumetric graph to maximize functional use in ADL's.  -Progressing 12/12/22    2. The patient/caregiver will be independent with home management of lymphedema.  -Progressing 12/12/22    3. Patient will increase length of time wearing day time compression to 12+ hours a day. -Progressing 12/12/22      Progress note: 12/12/22: Patient making progress towards goals, solid progress and on track towards long term goals. Treatment/Session Summary:    Treatment Assessment Lymphatic fluid around lateral mastectomy scares improved this date. Wears sleeve without issues and getting easier. Fitting at  today for night sleeve. Communication/Consultation:  None today  Equipment provided today:  0  Recommendations/Intent for next treatment session: Next visit will focus on CDT and MLD. Jolanta Ames     Total Treatment Billable Duration: 60 minutes  OT Individual Minutes  Time In: 0900  Time Out: 1000  Minutes: [address] [place] [address], OT    Post Session Pain  Charge Capture   POC Link  Treatment Note Link  MD Guidelines  MyChart    Future Appointments   Date Time Provider Mayte Tan   12/14/2022  3:30 PM [place]   12/20/2022  8:00 AM Myriam Coronado OT [facility]   1/5/2023  9:00 AM Krystle Swift OT [facility]   [facility]16/2023  [facility]   2/15/2023  1:50 PM [facility]   2/15/2023  2:30 PM Bethel Murray MD [facility] AMB   2/15/2023  3:30 PM  [facility]   10/30/2023  9:15 AM Vy Cano MD [facility] AMB

## 2022-12-12 NOTE — TELEPHONE ENCOUNTER
Pt has an Appt on 12/14/22 for SC Lupron & pt state she needs to add Labs for that day as well due to having menstraul cramps & discharge.

## 2022-12-12 NOTE — TELEPHONE ENCOUNTER
Msg to NP Aleksandra and she states if having vaginal discharge she needs to go to the GYN. Call to the patient and she states it is not a discharge it is her normal cycle with menstrual cramps. She has had it before.

## 2022-12-14 ENCOUNTER — HOSPITAL ENCOUNTER (OUTPATIENT)
Dept: INFUSION THERAPY | Age: 42
Discharge: HOME | End: 2022-12-14
Payer: COMMERCIAL

## 2022-12-14 VITALS
DIASTOLIC BLOOD PRESSURE: 78 MMHG | SYSTOLIC BLOOD PRESSURE: 112 MMHG | RESPIRATION RATE: 16 BRPM | TEMPERATURE: 98.5 F | OXYGEN SATURATION: 100 % | HEART RATE: 79 BPM

## 2022-12-14 DIAGNOSIS — C50.111 MALIGNANT NEOPLASM OF CENTRAL PORTION OF RIGHT BREAST IN FEMALE, ESTROGEN RECEPTOR POSITIVE (HCC): Primary | ICD-10-CM

## 2022-12-14 DIAGNOSIS — Z17.0 MALIGNANT NEOPLASM OF CENTRAL PORTION OF RIGHT BREAST IN FEMALE, ESTROGEN RECEPTOR POSITIVE (HCC): Primary | ICD-10-CM

## 2022-12-14 PROCEDURE — 96402 CHEMO HORMON ANTINEOPL SQ/IM: CPT

## 2022-12-14 PROCEDURE — 6360000002 HC RX W HCPCS: Performed by: INTERNAL MEDICINE

## 2022-12-14 RX ORDER — SODIUM CHLORIDE 9 MG/ML
INJECTION, SOLUTION INTRAVENOUS CONTINUOUS
OUTPATIENT
Start: 2023-01-11

## 2022-12-14 RX ORDER — DIPHENHYDRAMINE HYDROCHLORIDE 50 MG/ML
50 INJECTION INTRAMUSCULAR; INTRAVENOUS
OUTPATIENT
Start: 2023-01-11

## 2022-12-14 RX ORDER — ONDANSETRON 2 MG/ML
8 INJECTION INTRAMUSCULAR; INTRAVENOUS
OUTPATIENT
Start: 2023-01-11

## 2022-12-14 RX ORDER — EPINEPHRINE 1 MG/ML
0.3 INJECTION, SOLUTION, CONCENTRATE INTRAVENOUS PRN
OUTPATIENT
Start: 2023-01-11

## 2022-12-14 RX ORDER — ALBUTEROL SULFATE 90 UG/1
4 AEROSOL, METERED RESPIRATORY (INHALATION) PRN
OUTPATIENT
Start: 2023-01-11

## 2022-12-14 RX ORDER — ACETAMINOPHEN 325 MG/1
650 TABLET ORAL
OUTPATIENT
Start: 2023-01-11

## 2022-12-14 RX ADMIN — LEUPROLIDE ACETATE 3.75 MG: KIT at 15:48

## 2022-12-14 NOTE — PROGRESS NOTES
Arrived to the [facility]. Bharti completed. Provided education on Lupron    Patient instructed to report any side affects to ordering provider. Patient tolerated without complications. Any issues or concerns during appointment: NO.  Patient aware of next infusion appointment on 1/16/23 at . Discharged ambulatory.

## 2022-12-15 DIAGNOSIS — N95.0 POST-MENOPAUSE BLEEDING: ICD-10-CM

## 2022-12-15 DIAGNOSIS — C50.111 MALIGNANT NEOPLASM OF CENTRAL PORTION OF RIGHT BREAST IN FEMALE, ESTROGEN RECEPTOR POSITIVE (HCC): Primary | ICD-10-CM

## 2022-12-15 DIAGNOSIS — Z17.0 MALIGNANT NEOPLASM OF CENTRAL PORTION OF RIGHT BREAST IN FEMALE, ESTROGEN RECEPTOR POSITIVE (HCC): Primary | ICD-10-CM

## 2022-12-15 NOTE — TELEPHONE ENCOUNTER
Patient called again and states this is not a GYN issue. She was here yesterday in infusion and spoke to the nurse. They did not call triage at that time. The patient is on Arimidex and she would like this to go to the MD not the NP. She states she is ovulating and has menstrual cramps.   Msg to Dr Abel Reynolds and Giancarlo Guerrero RN    Msg received Call to the patient and she already has an appt when she spoke with Giancarlo Guerrero RN

## 2022-12-16 ENCOUNTER — HOSPITAL ENCOUNTER (OUTPATIENT)
Dept: LAB | Age: 42
Discharge: HOME | End: 2022-12-16
Payer: COMMERCIAL

## 2022-12-16 DIAGNOSIS — C50.111 MALIGNANT NEOPLASM OF CENTRAL PORTION OF RIGHT BREAST IN FEMALE, ESTROGEN RECEPTOR POSITIVE (HCC): ICD-10-CM

## 2022-12-16 DIAGNOSIS — Z17.0 MALIGNANT NEOPLASM OF CENTRAL PORTION OF RIGHT BREAST IN FEMALE, ESTROGEN RECEPTOR POSITIVE (HCC): ICD-10-CM

## 2022-12-16 DIAGNOSIS — N95.0 POST-MENOPAUSE BLEEDING: ICD-10-CM

## 2022-12-16 LAB — ESTRADIOL SERPL-MCNC: <11.8 PG/ML

## 2022-12-16 PROCEDURE — 82670 ASSAY OF TOTAL ESTRADIOL: CPT

## 2022-12-16 PROCEDURE — 36415 COLL VENOUS BLD VENIPUNCTURE: CPT

## 2022-12-20 ENCOUNTER — HOSPITAL ENCOUNTER (OUTPATIENT)
Dept: PHYSICAL THERAPY | Age: 42
Setting detail: RECURRING SERIES
End: 2022-12-20
Payer: COMMERCIAL

## 2022-12-20 NOTE — PROGRESS NOTES
Martha Pattie  : 1980  Primary: Chuck Crumble Sc  Secondary:  [address] @ [facility]  [address] [place] [address] Will Almaraz [place] [address]  Phone: [phone]  Fax: [phone] No data recorded  No data recorded    PT Visit Info:  No data recorded       OUTPATIENT PHYSICAL THERAPY 2022     Appt Desk   Episode   MyChart      Ms. Sage cancelled due to unknown reason.     Alvarado Radford OT    Future Appointments   Date Time Provider Mayte Tan   2022  7:00 PM Alvarado Radford, [facility]   2023  9:00 AM Leona Fabry, OT [facility]   2023  3:30 PM SS [facility]   2/15/2023  1:50 PM [facility] OUTREACH INSURANCE [facility]   2/15/2023  2:30 PM Maryse Nathan MD [facility] AMB   2/15/2023  3:30 PM SS [facility]   10/30/2023  9:15 AM Breanna Peacock MD [facility] AMB

## 2023-01-05 ENCOUNTER — HOSPITAL ENCOUNTER (OUTPATIENT)
Dept: PHYSICAL THERAPY | Age: 43
Setting detail: RECURRING SERIES
End: 2023-01-05
Payer: COMMERCIAL

## 2023-01-16 ENCOUNTER — HOSPITAL ENCOUNTER (OUTPATIENT)
Dept: INFUSION THERAPY | Age: 43
Discharge: HOME | End: 2023-01-16
Payer: COMMERCIAL

## 2023-01-16 ENCOUNTER — HOSPITAL ENCOUNTER (OUTPATIENT)
Dept: PHYSICAL THERAPY | Age: 43
Setting detail: RECURRING SERIES
Discharge: HOME | End: 2023-01-19
Payer: COMMERCIAL

## 2023-01-16 VITALS
OXYGEN SATURATION: 98 % | TEMPERATURE: 99.1 F | HEART RATE: 96 BPM | RESPIRATION RATE: 16 BRPM | DIASTOLIC BLOOD PRESSURE: 72 MMHG | SYSTOLIC BLOOD PRESSURE: 106 MMHG

## 2023-01-16 DIAGNOSIS — Z17.0 MALIGNANT NEOPLASM OF CENTRAL PORTION OF RIGHT BREAST IN FEMALE, ESTROGEN RECEPTOR POSITIVE (HCC): Primary | ICD-10-CM

## 2023-01-16 DIAGNOSIS — C50.111 MALIGNANT NEOPLASM OF CENTRAL PORTION OF RIGHT BREAST IN FEMALE, ESTROGEN RECEPTOR POSITIVE (HCC): Primary | ICD-10-CM

## 2023-01-16 PROCEDURE — 6360000002 HC RX W HCPCS: Performed by: INTERNAL MEDICINE

## 2023-01-16 PROCEDURE — 96402 CHEMO HORMON ANTINEOPL SQ/IM: CPT

## 2023-01-16 PROCEDURE — 97140 MANUAL THERAPY 1/> REGIONS: CPT

## 2023-01-16 RX ORDER — SODIUM CHLORIDE 9 MG/ML
INJECTION, SOLUTION INTRAVENOUS CONTINUOUS
OUTPATIENT
Start: 2023-02-06

## 2023-01-16 RX ORDER — ONDANSETRON 2 MG/ML
8 INJECTION INTRAMUSCULAR; INTRAVENOUS
OUTPATIENT
Start: 2023-02-06

## 2023-01-16 RX ORDER — ACETAMINOPHEN 325 MG/1
650 TABLET ORAL
OUTPATIENT
Start: 2023-02-06

## 2023-01-16 RX ORDER — ALBUTEROL SULFATE 90 UG/1
4 AEROSOL, METERED RESPIRATORY (INHALATION) PRN
OUTPATIENT
Start: 2023-02-06

## 2023-01-16 RX ORDER — EPINEPHRINE 1 MG/ML
0.3 INJECTION, SOLUTION, CONCENTRATE INTRAVENOUS PRN
OUTPATIENT
Start: 2023-02-06

## 2023-01-16 RX ORDER — DIPHENHYDRAMINE HYDROCHLORIDE 50 MG/ML
50 INJECTION INTRAMUSCULAR; INTRAVENOUS
OUTPATIENT
Start: 2023-02-06

## 2023-01-16 RX ADMIN — LEUPROLIDE ACETATE 3.75 MG: KIT at 15:58

## 2023-01-16 NOTE — PROGRESS NOTES
Arrived to the [facility]. Lupron completed. Patient tolerated well. Any issues or concerns during appointment: no.  Patient aware of next infusion appointment on 2/15/23 (date) at  (time). Patient aware of next lab and [facility] office visit on 2/15/23 (date) at [phone] (time). Patient instructed to call provider with temperature of 100.4 or greater or nausea/vomiting/ diarrhea or pain not controlled by medications  Discharged ambulatory.

## 2023-01-16 NOTE — PROGRESS NOTES
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 Referring Physician:  Baldemar Blizzard, MD MD Orders:  OT Eval and Treat   Date of Onset:  No data recorded   Allergies:  Lidocaine hcl, Penicillins, Iodine, and Clindamycin/lincomycin  Restrictions/Precautions:    No data recorded  No data recorded  Medications Last Reviewed:  2023     Interventions Planned: (Treatment may consist of any combination of the following)  Current Treatment Recommendations: ROM; Manual Therapy:  MLD; Modalities; Patient/Caregiver education & training; Safety education & training; Strengthening     Subjective Comments: \"I need to update my insurances with  and [employer]\"     Initial:      2/10 Post Session:      2/10  Medications Last Reviewed:  2023  Updated Objective Findings:  None Today  Treatment   MANUAL THERAPY: (60 minutes):   Manual Lymph Drainage: Completed both supine and side laying. Towel to cover up for comfort. Scar massage on mastectomy chest and axillary scars from lymph node dissection. Fibrotic tissue superior of Mastectomy scar. F/u with pump companies. Left mastectomy scar with some edema around lateral portion. MLD around scar and to add compression around that side at night as well.    Myofascial massage and release around painful cording on right elbow with good results. Lymph Nodes:    Cervical Supraclavicular Axillary Abdominal Inguinal Popliteal Antecubital   RIGHT [x]     [x]     [x]     [x]     [x]     []     [x]       LEFT [x]     [x]     [x]     [x]     []     []     []         Anastamoses:   Axillo-axillary Inguino-inguinal Axillo-inguinal Inguino-axillary   ANTERIOR [x]     []     [x]     []       POSTERIOR [x]     []     [x]     []       RIGHT [x]     []     [x]     []       LEFT []     []     []     []         Limbs:   []    RUE     []    LUE     [x]    RLE    []    LLE    SELF CARE: (0 minutes):   Lymph pad educated for night to wear inside sports bra and possibly using ace wrap to secure. PRETREATMENT AFFECTED LIMB(s): RIGHT UPPER EXTREMITY       Date:   10/27/22 11/29/22              Right / Left                  Axilla    []      []                  3 inches    []      [] R29  L28  R27  L26.5              Elbow    []      []  L22  R23  L22  R22              6 inches    []      []                  3 inches    []      []  no edema  no edema              Wrist    []      []  no edema  no edema              Palm    []      []                       []      []               Measurements are taken in centimeters:  2.54 cm = 1 inch     Short-Term Functional Goals: Time Frame: 30 days  1. The patient/caregiver will verbalize and demonstrate understanding of lymphedema precautions. --GOAL MET 12/12/22    2. Patient will be independent with skin care regimen to decrease risk of cellulitis. -GOAL MET 12/12/22    3. The patient/caregiver will be independent at donning and doffing right upper extremity compression sleeve and application of chest compression. -GOAL MET 12/12/22    4. The patient/caregiver will be independent with self-manual lymph drainage techniques and show decrease in limb volume.  -Progressing 12/12/22    5. Patient will be independent in lymphatic exercises. -Progressing 12/12/22    6.  Patient will reduce painful cording to 1 out of 10 in elbow. Upgrade to no pain. -12/12/22   7. Patient will increase length of time wearing day time compression to 8 hours a day. -GOAL MET 12/12/22    \  Discharge Goals: Time Frame: 90 days  1. Patient's right upper extremity circumferential measurements will decrease on volumetric graph to maximize functional use in ADL's.  -Progressing 12/12/22    2. The patient/caregiver will be independent with home management of lymphedema.  -Progressing 12/12/22    3. Patient will increase length of time wearing day time compression to 12+ hours a day. -Progressing 12/12/22      Progress note: 12/12/22: Patient making progress towards goals, solid progress and on track towards long term goals. Treatment/Session Summary:    Treatment Assessment Extra time on scapular and GH joint mobilization. Patient needs nighttime and MLD pump at home. Communication/Consultation:  None today  Equipment provided today:    Recommendations/Intent for next treatment session: Next visit will focus on CDT and MLD. Robyn Dominguez     Total Treatment Billable Duration: 60 minutes  OT Individual Minutes  Time In: 0800  Time Out: 0900  Minutes: [address] [place] [address]    Post Session Pain  Charge Capture   POC Link  Treatment Note Link  MD Guidelines  MyChart    Future Appointments   Date Time Provider Mayte Tan   1/16/2023  3:30 PM BMT[facility] [address] [place] [address]   1/30/2023  8:00 AM Mary Ramirez OT [facility]   2/15/2023  1:50 PM [address] [place] [address] [employer] [facility] [address] [place] [address]   2/15/2023  2:30 PM Melissa Wasserman MD [facility]   2/15/2023  3:30 PM SS [facility]   10/30/2023  9:15 AM Shasha Johnson MD [facility] AMB

## 2023-01-30 ENCOUNTER — HOSPITAL ENCOUNTER (OUTPATIENT)
Dept: PHYSICAL THERAPY | Age: 43
Setting detail: RECURRING SERIES
Discharge: HOME | End: 2023-02-02
Payer: COMMERCIAL

## 2023-01-30 PROCEDURE — 97140 MANUAL THERAPY 1/> REGIONS: CPT

## 2023-01-30 NOTE — PROGRESS NOTES
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 Referring Physician:  Blake Fisher MD MD Orders:  OT Eval and Treat   Date of Onset:  No data recorded   Allergies:  Lidocaine hcl, Penicillins, Iodine, and Clindamycin/lincomycin  Restrictions/Precautions:    No data recorded  No data recorded  Medications Last Reviewed:  2023     Interventions Planned: (Treatment may consist of any combination of the following)  Current Treatment Recommendations: ROM; Manual Therapy:  MLD; Modalities; Patient/Caregiver education & training; Safety education & training; Strengthening     Subjective Comments: \"I need to update my insurances with  and [employer]\"     Initial:      2/10 Post Session:      2/10  Medications Last Reviewed:  2023  Updated Objective Findings:  None Today  Treatment   MANUAL THERAPY: (60 minutes):   Manual Lymph Drainage: Completed both supine and side laying. Towel to cover up for comfort. Scar massage on mastectomy chest and axillary scars from lymph node dissection. Fibrotic tissue superior of Mastectomy scar. F/u with pump companies. Left mastectomy scar with some edema around lateral portion. MLD around scar and to add compression around that side at night as well.    Myofascial massage and release around painful cording on right elbow with good results. Lymph Nodes:    Cervical Supraclavicular Axillary Abdominal Inguinal Popliteal Antecubital   RIGHT [x]     [x]     [x]     [x]     [x]     []     [x]       LEFT [x]     [x]     [x]     [x]     []     []     []         Anastamoses:   Axillo-axillary Inguino-inguinal Axillo-inguinal Inguino-axillary   ANTERIOR [x]     []     [x]     []       POSTERIOR [x]     []     [x]     []       RIGHT [x]     []     [x]     []       LEFT []     []     []     []         Limbs:   []    RUE     []    LUE     [x]    RLE    []    LLE    SELF CARE: (0 minutes):   Lymph pad educated for night to wear inside sports bra and possibly using ace wrap to secure. PRETREATMENT AFFECTED LIMB(s): RIGHT UPPER EXTREMITY       Date:   10/27/22 11/29/22              Right / Left                  Axilla    []      []                  3 inches    []      [] R29  L28  R27  L26.5              Elbow    []      []  L22  R23  L22  R22              6 inches    []      []                  3 inches    []      []  no edema  no edema              Wrist    []      []  no edema  no edema              Palm    []      []                       []      []               Measurements are taken in centimeters:  2.54 cm = 1 inch     Short-Term Functional Goals: Time Frame: 30 days  1. The patient/caregiver will verbalize and demonstrate understanding of lymphedema precautions. --GOAL MET 12/12/22    2. Patient will be independent with skin care regimen to decrease risk of cellulitis. -GOAL MET 12/12/22    3. The patient/caregiver will be independent at donning and doffing right upper extremity compression sleeve and application of chest compression. -GOAL MET 12/12/22    4. The patient/caregiver will be independent with self-manual lymph drainage techniques and show decrease in limb volume.  -Progressing 12/12/22    5. Patient will be independent in lymphatic exercises. -Progressing 12/12/22    6.  Patient will reduce painful cording to 1 out of 10 in elbow. Upgrade to no pain. -12/12/22   7. Patient will increase length of time wearing day time compression to 8 hours a day. -GOAL MET 12/12/22    \  Discharge Goals: Time Frame: 90 days  1. Patient's right upper extremity circumferential measurements will decrease on volumetric graph to maximize functional use in ADL's.  -Progressing 12/12/22    2. The patient/caregiver will be independent with home management of lymphedema.  -Progressing 12/12/22    3. Patient will increase length of time wearing day time compression to 12+ hours a day. -Progressing 12/12/22      Progress note: 12/12/22: Patient making progress towards goals, solid progress and on track towards long term goals. Treatment/Session Summary:    Treatment Assessment Extra time on scapular and GH joint mobilization. Patient needs nighttime and MLD pump at home. Resent referral for Tactile Pump. Communication/Consultation:  None today  Equipment provided today:    Recommendations/Intent for next treatment session: Next visit will focus on CDT and MLD. Diana Shafer     Total Treatment Billable Duration: 60 minutes  OT Individual Minutes  Time In: 0800  Time Out: 0900  Minutes: [address] [place] [address]    Post Session Pain  Charge Capture   POC Link  Treatment Note Link  MD Guidelines  MyChart    Future Appointments   Date Time Provider Mayte Tan   2/15/2023  1:50 PM Shahrzad Contreras [facility]   2/15/2023  2:30 PM Tahmina Acosta MD [facility] AMB   2/15/2023  3:30 PM SS [facility]   10/30/2023  9:15 AM Jarome Landau, MD [facility] AMB

## 2023-02-09 DIAGNOSIS — Z17.0 MALIGNANT NEOPLASM OF CENTRAL PORTION OF RIGHT BREAST IN FEMALE, ESTROGEN RECEPTOR POSITIVE (HCC): Primary | ICD-10-CM

## 2023-02-09 DIAGNOSIS — C50.111 MALIGNANT NEOPLASM OF CENTRAL PORTION OF RIGHT BREAST IN FEMALE, ESTROGEN RECEPTOR POSITIVE (HCC): Primary | ICD-10-CM

## 2023-02-15 ENCOUNTER — OFFICE VISIT (OUTPATIENT)
Dept: ONCOLOGY | Age: 43
End: 2023-02-15
Payer: COMMERCIAL

## 2023-02-15 ENCOUNTER — HOSPITAL ENCOUNTER (OUTPATIENT)
Dept: INFUSION THERAPY | Age: 43
Discharge: HOME | End: 2023-02-15
Payer: COMMERCIAL

## 2023-02-15 ENCOUNTER — HOSPITAL ENCOUNTER (OUTPATIENT)
Dept: LAB | Age: 43
Discharge: HOME | End: 2023-02-18
Payer: COMMERCIAL

## 2023-02-15 VITALS
DIASTOLIC BLOOD PRESSURE: 87 MMHG | BODY MASS INDEX: 21.19 KG/M2 | HEART RATE: 97 BPM | HEIGHT: 70 IN | OXYGEN SATURATION: 100 % | TEMPERATURE: 98.5 F | RESPIRATION RATE: 18 BRPM | SYSTOLIC BLOOD PRESSURE: 128 MMHG | WEIGHT: 148 LBS

## 2023-02-15 DIAGNOSIS — Z17.0 MALIGNANT NEOPLASM OF CENTRAL PORTION OF RIGHT BREAST IN FEMALE, ESTROGEN RECEPTOR POSITIVE (HCC): Primary | ICD-10-CM

## 2023-02-15 DIAGNOSIS — C50.111 MALIGNANT NEOPLASM OF CENTRAL PORTION OF RIGHT BREAST IN FEMALE, ESTROGEN RECEPTOR POSITIVE (HCC): Primary | ICD-10-CM

## 2023-02-15 DIAGNOSIS — C50.111 MALIGNANT NEOPLASM OF CENTRAL PORTION OF RIGHT BREAST IN FEMALE, ESTROGEN RECEPTOR POSITIVE (HCC): ICD-10-CM

## 2023-02-15 DIAGNOSIS — Z17.0 MALIGNANT NEOPLASM OF CENTRAL PORTION OF RIGHT BREAST IN FEMALE, ESTROGEN RECEPTOR POSITIVE (HCC): ICD-10-CM

## 2023-02-15 LAB
ALBUMIN SERPL-MCNC: 4.2 G/DL (ref 3.5–5)
ALBUMIN/GLOB SERPL: 1.3 (ref 0.4–1.6)
ALP SERPL-CCNC: 103 U/L (ref 50–136)
ALT SERPL-CCNC: 21 U/L (ref 12–65)
ANION GAP SERPL CALC-SCNC: 5 MMOL/L (ref 2–11)
AST SERPL-CCNC: 10 U/L (ref 15–37)
BASOPHILS # BLD: 0 K/UL (ref 0–0.2)
BASOPHILS NFR BLD: 1 % (ref 0–2)
BILIRUB SERPL-MCNC: 0.4 MG/DL (ref 0.2–1.1)
BUN SERPL-MCNC: 13 MG/DL (ref 6–23)
CALCIUM SERPL-MCNC: 9.6 MG/DL (ref 8.3–10.4)
CHLORIDE SERPL-SCNC: 107 MMOL/L (ref 101–110)
CO2 SERPL-SCNC: 29 MMOL/L (ref 21–32)
CREAT SERPL-MCNC: 0.8 MG/DL (ref 0.6–1)
DIFFERENTIAL METHOD BLD: NORMAL
EOSINOPHIL # BLD: 0.1 K/UL (ref 0–0.8)
EOSINOPHIL NFR BLD: 2 % (ref 0.5–7.8)
ERYTHROCYTE [DISTWIDTH] IN BLOOD BY AUTOMATED COUNT: 12 % (ref 11.9–14.6)
ESTRADIOL SERPL-MCNC: 12.71 PG/ML
GLOBULIN SER CALC-MCNC: 3.3 G/DL (ref 2.8–4.5)
GLUCOSE SERPL-MCNC: 104 MG/DL (ref 65–100)
HCT VFR BLD AUTO: 42 % (ref 35.8–46.3)
HGB BLD-MCNC: 14.2 G/DL (ref 11.7–15.4)
IMM GRANULOCYTES # BLD AUTO: 0 K/UL (ref 0–0.5)
IMM GRANULOCYTES NFR BLD AUTO: 0 % (ref 0–5)
LYMPHOCYTES # BLD: 1.5 K/UL (ref 0.5–4.6)
LYMPHOCYTES NFR BLD: 28 % (ref 13–44)
MCH RBC QN AUTO: 29.5 PG (ref 26.1–32.9)
MCHC RBC AUTO-ENTMCNC: 33.8 G/DL (ref 31.4–35)
MCV RBC AUTO: 87.3 FL (ref 82–102)
MONOCYTES # BLD: 0.2 K/UL (ref 0.1–1.3)
MONOCYTES NFR BLD: 4 % (ref 4–12)
NEUTS SEG # BLD: 3.6 K/UL (ref 1.7–8.2)
NEUTS SEG NFR BLD: 65 % (ref 43–78)
NRBC # BLD: 0 K/UL (ref 0–0.2)
PLATELET # BLD AUTO: 198 K/UL (ref 150–450)
PMV BLD AUTO: 9.5 FL (ref 9.4–12.3)
POTASSIUM SERPL-SCNC: 3.6 MMOL/L (ref 3.5–5.1)
PROT SERPL-MCNC: 7.5 G/DL (ref 6.3–8.2)
RBC # BLD AUTO: 4.81 M/UL (ref 4.05–5.2)
SODIUM SERPL-SCNC: 141 MMOL/L (ref 133–143)
WBC # BLD AUTO: 5.4 K/UL (ref 4.3–11.1)

## 2023-02-15 PROCEDURE — 82670 ASSAY OF TOTAL ESTRADIOL: CPT

## 2023-02-15 PROCEDURE — 6360000002 HC RX W HCPCS: Performed by: INTERNAL MEDICINE

## 2023-02-15 PROCEDURE — 96402 CHEMO HORMON ANTINEOPL SQ/IM: CPT

## 2023-02-15 PROCEDURE — 99214 OFFICE O/P EST MOD 30 MIN: CPT | Performed by: INTERNAL MEDICINE

## 2023-02-15 PROCEDURE — 36415 COLL VENOUS BLD VENIPUNCTURE: CPT

## 2023-02-15 PROCEDURE — 85025 COMPLETE CBC W/AUTO DIFF WBC: CPT

## 2023-02-15 PROCEDURE — 80053 COMPREHEN METABOLIC PANEL: CPT

## 2023-02-15 RX ORDER — SODIUM CHLORIDE 9 MG/ML
INJECTION, SOLUTION INTRAVENOUS CONTINUOUS
OUTPATIENT
Start: 2023-03-15

## 2023-02-15 RX ORDER — ALBUTEROL SULFATE 90 UG/1
4 AEROSOL, METERED RESPIRATORY (INHALATION) PRN
OUTPATIENT
Start: 2023-03-15

## 2023-02-15 RX ORDER — ACETAMINOPHEN 325 MG/1
650 TABLET ORAL
OUTPATIENT
Start: 2023-03-15

## 2023-02-15 RX ORDER — ONDANSETRON 2 MG/ML
8 INJECTION INTRAMUSCULAR; INTRAVENOUS
OUTPATIENT
Start: 2023-03-15

## 2023-02-15 RX ORDER — DIPHENHYDRAMINE HYDROCHLORIDE 50 MG/ML
50 INJECTION INTRAMUSCULAR; INTRAVENOUS
OUTPATIENT
Start: 2023-03-15

## 2023-02-15 RX ORDER — EPINEPHRINE 1 MG/ML
0.3 INJECTION, SOLUTION, CONCENTRATE INTRAVENOUS PRN
OUTPATIENT
Start: 2023-03-15

## 2023-02-15 RX ADMIN — LEUPROLIDE ACETATE 3.75 MG: KIT at 15:21

## 2023-02-15 ASSESSMENT — PATIENT HEALTH QUESTIONNAIRE - PHQ9
SUM OF ALL RESPONSES TO PHQ QUESTIONS 1-9: 0
SUM OF ALL RESPONSES TO PHQ9 QUESTIONS 1 & 2: 0
2. FEELING DOWN, DEPRESSED OR HOPELESS: 0
SUM OF ALL RESPONSES TO PHQ QUESTIONS 1-9: 0
1. LITTLE INTEREST OR PLEASURE IN DOING THINGS: 0
SUM OF ALL RESPONSES TO PHQ QUESTIONS 1-9: 0
SUM OF ALL RESPONSES TO PHQ QUESTIONS 1-9: 0

## 2023-02-15 ASSESSMENT — PAIN DESCRIPTION - LOCATION: LOCATION: BREAST

## 2023-02-15 ASSESSMENT — PAIN SCALES - GENERAL: PAINLEVEL_OUTOF10: 5

## 2023-02-15 ASSESSMENT — PAIN DESCRIPTION - ORIENTATION: ORIENTATION: LEFT

## 2023-02-15 ASSESSMENT — PAIN DESCRIPTION - ONSET: ONSET: ON-GOING

## 2023-02-15 ASSESSMENT — PAIN - FUNCTIONAL ASSESSMENT: PAIN_FUNCTIONAL_ASSESSMENT: PREVENTS OR INTERFERES SOME ACTIVE ACTIVITIES AND ADLS

## 2023-02-15 ASSESSMENT — PAIN DESCRIPTION - DESCRIPTORS: DESCRIPTORS: DULL;ACHING

## 2023-02-15 NOTE — PROGRESS NOTES
[employer] [facility]: Office Visit New Patient H & P    Chief Complaint:    Chief Complaint   Patient presents with    Follow-up         History of Present Illness:  Ms. Alyssa Glaser is a 38 y.o. female who presents today for evaluation regarding breast cancer. In the spring of 2021, she developed a palpable abnormality and pain in her left breast. Bilateral digital diagnostic mammogram 3D/2D and targeted right ultrasound on 4/1/2021 identified an abnormal calcification in the central and mid upper portion of the right breast.  A hypoechoic lesion was seen on ultrasound corresponding to the dominant collection of calcifications seen on mammogram at the 12 o'clock position areolar margin. Since the calcifications appeared to extend over a larger area than the ultrasound finding, stereotactic biopsy was recommended for further evaluation. This was performed on 4/6/21 with pathology revealing DCIS, ER 93%/PA 17% positive. Bilateral MRI of breasts with CAD was obtained on 4/13/2021 demonstrating extensive suspicious discontiguous clustered ring enhancement involving the right UOQ extending to the midline and just below the nipple laterally mimicking the distribution of the calcifications noted mammographically and consistent with the known DCIS; 1.4 cm separate area of clustered ring enhancement in the right axillary tail, suspicious for additional disease; and left breast with no significant enhancing lesions. She was evaluated in consultation at [facility] breast [facility] on 4/15/22. Genetic counseling and testing with 84 gene panel sent on 4/15/2021 was negative for pathogenic mutation. She ultimately decided to proceed with bilateral mastectomy with immediate reconstruction and right sentinel node biopsy which was performed on 5/10/21. Pathology from the left breast showed atypical ductal hyperplasia.  Pathology from the right breast showed ER 93%/PR13%/HER-2 positive by FISH, poorly differentiated multifocal (10) invasive ductal adenocarcinoma with the largest lesion measuring 1.7 cm with micrometastasis to 1 sentinel lymph node. Completion axillary dissection and port placement was performed on 5/25/2021 with 11 benign lymph nodes. She was treated with adjuvant TCH x 6 cycles with completion of 1 year of Herceptin. She was also placed on Arimidex with [facility] agonist.  When she established with [facility] we recommended continued OFS+AI. Here for follow-up. She is doing OK. She had some vaginal bleeding and discharge in December after her first Lupron here, but estradiol level was undetectable and we felt this was very unlikely to be related to any ovarian reactivation. She has some nausea and general malaise from the Lupron, but no major symptoms. She is interested now in BSO for definitive ovarian suppression. She does have some vasomotor symptoms from the endocrine therapy, but these are stable. She notes some hardening of an area of scar tissue in the superior right chest wall, she had an ultrasound in September 2022 at [facility] that suggested there was a residual pocket from her removed expander, but no pathology. She is having some tenderness associated with the area. Plastics could not get her back in until late March. Review of Systems:  Constitutional: Negative. HENT: Negative. Eyes: Negative. Respiratory: Negative. Cardiovascular: Negative. Gastrointestinal: Negative. Genitourinary: Negative. Musculoskeletal: Negative. Skin: Negative. Neurological: Negative. Endo/Heme/Allergies: Negative. Psychiatric/Behavioral: Negative. All other systems reviewed and are negative.      Allergies   Allergen Reactions    Lidocaine Hcl Anaphylaxis    Penicillins Rash    Iodine Swelling    Clindamycin/Lincomycin Rash     Past Medical History:   Diagnosis Date    Breast cancer in female [facility])     Gastrointestinal disorder     GERD     Past Surgical History:   Procedure Laterality Date    GYN      c section    LYMPH NODE DISSECTION N/A     OTHER SURGICAL HISTORY      double masectomy     No family history on file. Social History     Socioeconomic History    Marital status:      Spouse name: Not on file    Number of children: Not on file    Years of education: Not on file    Highest education level: Not on file   Occupational History    Not on file   Tobacco Use    Smoking status: Never    Smokeless tobacco: Never   Substance and Sexual Activity    Alcohol use: No    Drug use: No    Sexual activity: Not on file   Other Topics Concern    Not on file   Social History Narrative    Not on file     Social Determinants of Health     Financial Resource Strain: Not on file   Food Insecurity: Not on file   Transportation Needs: Not on file   Physical Activity: Not on file   Stress: Not on file   Social Connections: Not on file   Intimate Partner Violence: Not on file   Housing Stability: Not on file     Current Outpatient Medications   Medication Sig Dispense Refill    leuprolide (LUPRON) 11.25 MG injection Inject 11.25 mg into the muscle once      anastrozole (ARIMIDEX) 1 MG tablet Take 1 mg by mouth daily      loratadine (CLARITIN) 10 MG tablet Take 10 mg by mouth daily      omeprazole (PRILOSEC) 40 MG delayed release capsule Take 40 mg by mouth as needed       No current facility-administered medications for this visit. OBJECTIVE:  /87 Comment: standing  Pulse 97   Temp 98.5 °F (36.9 °C) (Oral)   Resp 18   Ht 5' 10\" (1.778 m)   Wt 148 lb (67.1 kg)   SpO2 100%   BMI 21.24 kg/m²     Physical Exam:  Constitutional: Well developed, well nourished female in no acute distress, sitting comfortably on the examination table. HEENT: Normocephalic and atraumatic. Sclerae anicteric. Neck supple without JVD. No thyromegaly present. Lymph node   No palpable submandibular, cervical, supraclavicular lymph nodes. Skin Warm and dry. No bruising and no rash noted. No erythema. No pallor. Respiratory Lungs are clear to auscultation bilaterally without wheezes, rales or rhonchi, normal air exchange without accessory muscle use. CVS Normal rate, regular rhythm and normal S1 and S2. No murmurs, gallops, or rubs. Neuro Grossly nonfocal with no obvious sensory or motor deficits. MSK Normal range of motion in general.  No edema and no tenderness. Psych Appropriate mood and affect.       Labs:  Recent Results (from the past 24 hour(s))   CBC with Auto Differential    Collection Time: 02/15/23  2:24 PM   Result Value Ref Range    WBC 5.4 4.3 - 11.1 K/uL    RBC 4.81 4.05 - 5.2 M/uL    Hemoglobin 14.2 11.7 - 15.4 g/dL    Hematocrit 42.0 35.8 - 46.3 %    MCV 87.3 82.0 - 102.0 FL    MCH 29.5 26.1 - 32.9 PG    MCHC 33.8 31.4 - 35.0 g/dL    RDW 12.0 11.9 - 14.6 %    Platelets 706 297 - 893 K/uL    MPV 9.5 9.4 - 12.3 FL    nRBC 0.00 0.0 - 0.2 K/uL    Seg Neutrophils 65 43 - 78 %    Lymphocytes 28 13 - 44 %    Monocytes 4 4.0 - 12.0 %    Eosinophils % 2 0.5 - 7.8 %    Basophils 1 0.0 - 2.0 %    Immature Granulocytes 0 0.0 - 5.0 %    Segs Absolute 3.6 1.7 - 8.2 K/UL    Absolute Lymph # 1.5 0.5 - 4.6 K/UL    Absolute Mono # 0.2 0.1 - 1.3 K/UL    Absolute Eos # 0.1 0.0 - 0.8 K/UL    Basophils Absolute 0.0 0.0 - 0.2 K/UL    Absolute Immature Granulocyte 0.0 0.0 - 0.5 K/UL    Differential Type AUTOMATED     Comprehensive Metabolic Panel    Collection Time: 02/15/23  2:24 PM   Result Value Ref Range    Sodium 141 133 - 143 mmol/L    Potassium 3.6 3.5 - 5.1 mmol/L    Chloride 107 101 - 110 mmol/L    CO2 29 21 - 32 mmol/L    Anion Gap 5 2 - 11 mmol/L    Glucose 104 (H) 65 - 100 mg/dL    BUN 13 6 - 23 MG/DL    Creatinine 0.80 0.6 - 1.0 MG/DL    Est, Glom Filt Rate >60 >60 ml/min/1.73m2    Calcium 9.6 8.3 - 10.4 MG/DL    Total Bilirubin 0.4 0.2 - 1.1 MG/DL    ALT 21 12 - 65 U/L    AST 10 (L) 15 - 37 U/L    Alk Phosphatase 103 50 - 136 U/L    Total Protein 7.5 6.3 - 8.2 g/dL    Albumin 4.2 3.5 - 5.0 g/dL    Globulin 3.3 2.8 - 4.5 g/dL    Albumin/Globulin Ratio 1.3 0.4 - 1.6         Imaging:  No results found. ASSESSMENT:   Diagnosis Orders   1. Malignant neoplasm of central portion of right breast in female, estrogen receptor positive ([facility] [address]  Estradiol    US BREAST COMPLETE RIGHT    Joao Rivera MD, Gynecologic Oncology, [place]          Patient Active Problem List   Diagnosis    Breast pain, left    Malignant neoplasm of central portion of right breast in female, estrogen receptor positive [facility])           PLAN:  Lab studies were personally reviewed. Pertinent old records were reviewed. Breast cancer: 2021, multifocal with largest tumor 1.7 cm, 1 lymph node with micrometastasis, ER/MD/HER2 positive. S/p mastectomy, ALND, adjuvant TCH followed by Herceptin, now on endocrine therapy with Arimidex and Lupron. I discussed the pathophysiology and natural history of breast cancer with Ms. Iqbal. She completed appropriate adjuvant therapy with [facility], she did inquire about the potential benefit of pertuzumab adjuvantly, in [facility] patients who were node positive benefited from addition of P to [facility], with the micrometastasis it is unclear if she would have been in that category, it is certainly not unreasonable to have omitted this. We also discussed the choices for adjuvant endocrine therapy including ongoing OFS+AI vs tamoxifen, she is willing to continue OFS and AI given the expected incremental benefit. We will continue monthly Lupron at our facility and follow-up in 3 months. Here for follow-up. She is doing OK. She had some vaginal bleeding and discharge in December after her first Lupron here, but estradiol level was undetectable and we felt this was very unlikely to be related to any ovarian reactivation. She has some nausea and general malaise from the Lupron, but no major symptoms. She is interested now in BSO for definitive ovarian suppression.   She does have some vasomotor symptoms from the endocrine therapy, but these are stable. She notes some hardening of an area of scar tissue in the superior right chest wall, she had an ultrasound in September 2022 at [facility] that suggested there was a residual pocket from her removed expander, but no pathology. She is having some tenderness associated with the area. Plastics could not get her back in until late March. Labs reviewed and unremarkable, we will repeat her E2 level but I fully expect this to be low consistent with ongoing menopause from OFS. I would recommend continued Lupron, but certainly I am in favor of BSO if she is willing, she will let us know if she wants to see Dr. Husam Oconnor or return to Dr. Erendira Mckee for the procedure. Continue Arimidex regardless. We will schedule repeat ultrasound of the right chest wall, my suspicion for malignancy ios very low but there may be another finding that guides us in management. All questions were asked and answered to the best of my ability. F/u monthly for Lupron until completing BSO, 3 months for OV.           Cristian Atkins MD, MD  [facility] Hematology and Oncology  [address] [place] [address]  Office : [phone]  Fax : [phone]

## 2023-02-15 NOTE — PROGRESS NOTES
Arrived to the [facility] ambulatory. Lupron injection completed. Patient tolerated well. Any issues or concerns during appointment: none. Patient instructed to call provider with temperature of 100.4 or greater or nausea/vomiting/ diarrhea or pain not controlled by medications  Discharged home ambulatory.

## 2023-02-15 NOTE — PATIENT INSTRUCTIONS
Patient Instructions from Today's Visit    Reason for Visit:  Follow up Breast Cancer    Diagnosis Information:  [email or web address]    Plan:  Lab results reviewed     Follow Up:   Follow up in 4 weeks with labs prior    Recent Lab Results:  Hospital Outpatient Visit on 02/15/2023   Component Date Value Ref Range Status    WBC 02/15/2023 5.4  4.3 - 11.1 K/uL Final    RBC 02/15/2023 4.81  4.05 - 5.2 M/uL Final    Hemoglobin 02/15/2023 14.2  11.7 - 15.4 g/dL Final    Hematocrit 02/15/2023 42.0  35.8 - 46.3 % Final    MCV 02/15/2023 87.3  82.0 - 102.0 FL Final    MCH 02/15/2023 29.5  26.1 - 32.9 PG Final    MCHC 02/15/2023 33.8  31.4 - 35.0 g/dL Final    RDW 02/15/2023 12.0  11.9 - 14.6 % Final    Platelets 97/91/7738 198  150 - 450 K/uL Final    MPV 02/15/2023 9.5  9.4 - 12.3 FL Final    nRBC 02/15/2023 0.00  0.0 - 0.2 K/uL Final    **Note: Absolute NRBC parameter is now reported with Hemogram**    Seg Neutrophils 02/15/2023 65  43 - 78 % Final    Lymphocytes 02/15/2023 28  13 - 44 % Final    Monocytes 02/15/2023 4  4.0 - 12.0 % Final    Eosinophils % 02/15/2023 2  0.5 - 7.8 % Final    Basophils 02/15/2023 1  0.0 - 2.0 % Final    Immature Granulocytes 02/15/2023 0  0.0 - 5.0 % Final    Segs Absolute 02/15/2023 3.6  1.7 - 8.2 K/UL Final    Absolute Lymph # 02/15/2023 1.5  0.5 - 4.6 K/UL Final    Absolute Mono # 02/15/2023 0.2  0.1 - 1.3 K/UL Final    Absolute Eos # 02/15/2023 0.1  0.0 - 0.8 K/UL Final    Basophils Absolute 02/15/2023 0.0  0.0 - 0.2 K/UL Final    Absolute Immature Granulocyte 02/15/2023 0.0  0.0 - 0.5 K/UL Final    Differential Type 02/15/2023 AUTOMATED    Final     Treatment Summary has been discussed and given to patient: N/A    -------------------------------------------------------------------------------------------------------------------  Please call our office at [phone] if you have any  of the following symptoms:   Fever of 100.5 or greater  Chills  Shortness of breath  Swelling or pain in one leg    After office hours an answering service is available and will contact a provider for emergencies or if you are experiencing any of the above symptoms. Patient does express an interest in My Chart. My Chart log in information explained on the after visit summary printout at the [facility] desk.     Rosaura Tobias RN

## 2023-02-28 NOTE — PROGRESS NOTES
Date: 3/1/2023        Patient Name: Chinedu Dolan     YOB: 1980          Chief Complaint     Chief Complaint   Patient presents with    New Patient      Premenopauseal er pos breast cancer   On long term AI/lupron    History of Present Illness   Chinedu Dolan is a 38 y. o. who presents today for scheduled visit. In the spring of 2021, she developed a palpable abnormality and pain in her left breast. Bilateral digital diagnostic mammogram 3D/2D and targeted right ultrasound on 4/1/2021 identified an abnormal calcification in the central and mid upper portion of the right breast.  A hypoechoic lesion was seen on ultrasound corresponding to the dominant collection of calcifications seen on mammogram at the 12 o'clock position areolar margin. Since the calcifications appeared to extend over a larger area than the ultrasound finding, stereotactic biopsy was recommended for further evaluation. This was performed on 4/6/21 with pathology revealing DCIS, ER 93%/ME 17% positive. Bilateral MRI of breasts with CAD was obtained on 4/13/2021 demonstrating extensive suspicious discontiguous clustered ring enhancement involving the right UOQ extending to the midline and just below the nipple laterally mimicking the distribution of the calcifications noted mammographically and consistent with the known DCIS; 1.4 cm separate area of clustered ring enhancement in the right axillary tail, suspicious for additional disease; and left breast with no significant enhancing lesions. She was evaluated in consultation at [facility] breast [facility] on 4/15/22. Genetic counseling and testing with 84 gene panel sent on 4/15/2021 was negative for pathogenic mutation. She ultimately decided to proceed with bilateral mastectomy with immediate reconstruction and right sentinel node biopsy which was performed on 5/10/21. Pathology from the left breast showed atypical ductal hyperplasia.  Pathology from the right breast showed ER 93%/PR13%/HER-2 positive by FISH, poorly differentiated multifocal (10) invasive ductal adenocarcinoma with the largest lesion measuring 1.7 cm with micrometastasis to 1 sentinel lymph node. Completion axillary dissection and port placement was performed on 5/25/2021 with 11 benign lymph nodes. She was treated with adjuvant TCH x 6 cycles with completion of 1 year of Herceptin. She was also placed on Arimidex with [facility] agonist.  When she established with [facility] we recommended continued OFS+AI. She had some vaginal bleeding and discharge in December after her first Lupron here, but estradiol level was undetectable and we felt this was very unlikely to be related to any ovarian reactivation. She has some nausea and general malaise from the Lupron, but no major symptoms. She is interested now in BSO for definitive ovarian suppression.         Past Medical History     Past Medical History:   Diagnosis Date    Breast cancer in female [facility])     Gastrointestinal disorder     GERD        Past Surgical History     Past Surgical History:   Procedure Laterality Date    GYN      c section    LYMPH NODE DISSECTION N/A     OTHER SURGICAL HISTORY      double masectomy        Medications      Current Outpatient Medications:     leuprolide (LUPRON) 11.25 MG injection, Inject 11.25 mg into the muscle once Every 3 weeks, Disp: , Rfl:     anastrozole (ARIMIDEX) 1 MG tablet, Take 1 mg by mouth daily, Disp: , Rfl:     loratadine (CLARITIN) 10 MG tablet, Take 10 mg by mouth daily PRN, Disp: , Rfl:     omeprazole (PRILOSEC) 40 MG delayed release capsule, Take 40 mg by mouth as needed, Disp: , Rfl:      Allergies   Clindamycin, Lidocaine, Lidocaine hcl, Penicillins, Iodinated contrast media, Iodine, Shellfish allergy, Adhesive tape, and Clindamycin/lincomycin    Social History     Social History       Tobacco History       Smoking Status  Never      Smokeless Tobacco Use  Never              Alcohol History Alcohol Use Status  No              Drug Use       Drug Use Status  No              Sexual Activity       Sexually Active  Not Asked                    Family History   No family history on file. Review of Systems   Review of Systems    Physical Exam     ECOG PERFORMANCE STATUS - 0-Fully active, able to carry on all pre-disease performance without restriction. Blood pressure 108/77, pulse (!) 104, temperature 98 °F (36.7 °C), resp. rate 16, height 5' 10\" (1.778 m), weight 148 lb 4.8 oz (67.3 kg), SpO2 100 %. Physical Exam    Labs        No results found for this or any previous visit (from the past 48 hour(s)). No results found for:      Pathology      Component Ref Range & Units 10/25/22 0916    Diagnosis:   Comment    Comment: (NOTE)   NEGATIVE FOR INTRAEPITHELIAL LESION OR MALIGNANCY. Imaging/Diagnostics Last 24 Hours   No results found. Radiology:    CT Result (most recent):  No results found for this or any previous visit from the past 3650 days. PET Results (most recent):  No results found for this or any previous visit from the past 365 days. Mammo Results (most recent):  No results found for this or any previous visit from the past 365 days. US Result (most recent):  US ABDOMEN LIMITED 04/04/2022    Narrative  RIGHT UPPER QUADRANT SONOGRAPHY:    CLINICAL HISTORY: Right upper quadrant abdominal pain for one day. COMPARISON: January 4, 2010. FINDINGS: Multiple images from real time ultrasound evaluation of the RUQ show  that the gallbladder is normal in size and configuration, without evidence of  stone, wall thickening, or pericholecystic fluid. No tenderness was elicited  while scanning over the gallbladder. The common duct is normal in caliber at 7  mm, and no significant intrahepatic bilary ductal dilatation is evident. No  focal liver lesion is seen. The pancreas is normal in contour and echogenicity  where seen.  The right kidney is not obstructed. Impression  Unremarkable examination. Assessment       Diagnosis Orders   1. Malignant neoplasm of central portion of right breast in female, estrogen receptor positive [facility])          Specialty Problems          Oncology Problems    Malignant neoplasm of central portion of right breast in female, estrogen receptor positive ([facility] [address]           Plan   Dw pt options   Raciel/bso with prior c/s scar   Robotic hyst, bso   Laparoscopic bso   Cont current  Rba of each regaring breast cancer management reviewd, pt to consider.     100% care coordination and counseling x 30 min              Boo Guillaume MD  [facility]  [address] [place] [address]  Office : [phone]  Fax : [phone]

## 2023-03-01 ENCOUNTER — OFFICE VISIT (OUTPATIENT)
Dept: ONCOLOGY | Age: 43
End: 2023-03-01
Payer: COMMERCIAL

## 2023-03-01 VITALS
SYSTOLIC BLOOD PRESSURE: 108 MMHG | TEMPERATURE: 98 F | RESPIRATION RATE: 16 BRPM | WEIGHT: 148.3 LBS | BODY MASS INDEX: 21.23 KG/M2 | HEART RATE: 104 BPM | OXYGEN SATURATION: 100 % | HEIGHT: 70 IN | DIASTOLIC BLOOD PRESSURE: 77 MMHG

## 2023-03-01 DIAGNOSIS — C50.111 MALIGNANT NEOPLASM OF CENTRAL PORTION OF RIGHT BREAST IN FEMALE, ESTROGEN RECEPTOR POSITIVE (HCC): Primary | ICD-10-CM

## 2023-03-01 DIAGNOSIS — Z17.0 MALIGNANT NEOPLASM OF CENTRAL PORTION OF RIGHT BREAST IN FEMALE, ESTROGEN RECEPTOR POSITIVE (HCC): Primary | ICD-10-CM

## 2023-03-01 PROCEDURE — 99203 OFFICE O/P NEW LOW 30 MIN: CPT | Performed by: OBSTETRICS & GYNECOLOGY

## 2023-03-01 ASSESSMENT — PATIENT HEALTH QUESTIONNAIRE - PHQ9
2. FEELING DOWN, DEPRESSED OR HOPELESS: 0
SUM OF ALL RESPONSES TO PHQ QUESTIONS 1-9: 0

## 2023-03-01 NOTE — PATIENT INSTRUCTIONS
Patient Instructions from Today's Visit    Reason for Visit:  New Patient    Diagnosis Information:  [email or web address]-answers-[email or web address]-education-materials/[email or web address]      Plan: You are low risk for surgery. We will plan to get you scheduled for surgery to remove the ovaries. You can also have the uterus removed and just opt for a hysterectomy. There are two incision types, either five little robotic holes (hysterectomy) or re use you  scar    Follow Up: With surgery, please call with your decision     Recent Lab Results:  N/A    Treatment Summary has been discussed and given to patient:   N/A      -------------------------------------------------------------------------------------------------------------------    Patient does express an interest in My Chart. My Chart log in information explained on the after visit summary printout at the . [facility] 90 desk.     ALEYDA Buck

## 2023-03-02 ENCOUNTER — HOSPITAL ENCOUNTER (OUTPATIENT)
Dept: MRI IMAGING | Age: 43
End: 2023-03-02
Payer: COMMERCIAL

## 2023-03-02 ENCOUNTER — HOSPITAL ENCOUNTER (OUTPATIENT)
Dept: MAMMOGRAPHY | Age: 43
End: 2023-03-02
Payer: COMMERCIAL

## 2023-03-02 ENCOUNTER — HOSPITAL ENCOUNTER (EMERGENCY)
Age: 43
Discharge: HOME | End: 2023-03-02
Attending: EMERGENCY MEDICINE
Payer: COMMERCIAL

## 2023-03-02 VITALS — DIASTOLIC BLOOD PRESSURE: 77 MMHG | SYSTOLIC BLOOD PRESSURE: 112 MMHG | HEART RATE: 84 BPM | RESPIRATION RATE: 17 BRPM

## 2023-03-02 DIAGNOSIS — R92.8 ABNORMAL MAMMOGRAM OF RIGHT BREAST: ICD-10-CM

## 2023-03-02 DIAGNOSIS — Z17.0 MALIGNANT NEOPLASM OF CENTRAL PORTION OF RIGHT BREAST IN FEMALE, ESTROGEN RECEPTOR POSITIVE (HCC): ICD-10-CM

## 2023-03-02 DIAGNOSIS — T78.40XA ALLERGIC REACTION TO DRUG, INITIAL ENCOUNTER: ICD-10-CM

## 2023-03-02 DIAGNOSIS — L50.9 URTICARIA: Primary | ICD-10-CM

## 2023-03-02 DIAGNOSIS — C50.111 MALIGNANT NEOPLASM OF CENTRAL PORTION OF RIGHT BREAST IN FEMALE, ESTROGEN RECEPTOR POSITIVE (HCC): ICD-10-CM

## 2023-03-02 PROCEDURE — C8908 MRI W/O FOL W/CONT, BREAST,: HCPCS

## 2023-03-02 PROCEDURE — 96374 THER/PROPH/DIAG INJ IV PUSH: CPT

## 2023-03-02 PROCEDURE — 96375 TX/PRO/DX INJ NEW DRUG ADDON: CPT

## 2023-03-02 PROCEDURE — 99284 EMERGENCY DEPT VISIT MOD MDM: CPT

## 2023-03-02 PROCEDURE — 6360000002 HC RX W HCPCS

## 2023-03-02 PROCEDURE — 6360000004 HC RX CONTRAST MEDICATION: Performed by: INTERNAL MEDICINE

## 2023-03-02 PROCEDURE — 76642 ULTRASOUND BREAST LIMITED: CPT

## 2023-03-02 PROCEDURE — A9579 GAD-BASE MR CONTRAST NOS,1ML: HCPCS | Performed by: INTERNAL MEDICINE

## 2023-03-02 PROCEDURE — 2500000003 HC RX 250 WO HCPCS

## 2023-03-02 PROCEDURE — A4216 STERILE WATER/SALINE, 10 ML: HCPCS

## 2023-03-02 PROCEDURE — 2580000003 HC RX 258

## 2023-03-02 RX ORDER — METHYLPREDNISOLONE SODIUM SUCCINATE 125 MG/2ML
125 INJECTION, POWDER, LYOPHILIZED, FOR SOLUTION INTRAMUSCULAR; INTRAVENOUS ONCE
Status: COMPLETED | OUTPATIENT
Start: 2023-03-02 | End: 2023-03-02

## 2023-03-02 RX ORDER — SODIUM CHLORIDE 0.9 % (FLUSH) 0.9 %
20 SYRINGE (ML) INJECTION AS NEEDED
Status: DISCONTINUED | OUTPATIENT
Start: 2023-03-02 | End: 2023-03-06 | Stop reason: HOSPADM

## 2023-03-02 RX ORDER — DIPHENHYDRAMINE HYDROCHLORIDE 50 MG/ML
25 INJECTION INTRAMUSCULAR; INTRAVENOUS
Status: COMPLETED | OUTPATIENT
Start: 2023-03-02 | End: 2023-03-02

## 2023-03-02 RX ADMIN — METHYLPREDNISOLONE SODIUM SUCCINATE 125 MG: 125 INJECTION, POWDER, FOR SOLUTION INTRAMUSCULAR; INTRAVENOUS at 14:17

## 2023-03-02 RX ADMIN — FAMOTIDINE 20 MG: 10 INJECTION, SOLUTION INTRAVENOUS at 14:17

## 2023-03-02 RX ADMIN — DIPHENHYDRAMINE HYDROCHLORIDE 25 MG: 50 INJECTION, SOLUTION INTRAMUSCULAR; INTRAVENOUS at 14:16

## 2023-03-02 RX ADMIN — GADOTERIDOL 13 ML: 279.3 INJECTION, SOLUTION INTRAVENOUS at 14:09

## 2023-03-02 ASSESSMENT — ENCOUNTER SYMPTOMS
COLOR CHANGE: 0
NAUSEA: 0
VOMITING: 0
SHORTNESS OF BREATH: 0
ABDOMINAL PAIN: 0
CHEST TIGHTNESS: 0
DIARRHEA: 0

## 2023-03-02 NOTE — ED TRIAGE NOTES
Pt w/c to ER from MRI with c/o allergic reaction contrast that was given at joao 1350. Pt c/o rash/hives, itchy throat, and lightheadedness.

## 2023-03-02 NOTE — ED PROVIDER NOTES
Emergency Department Provider Note                   PCP:                Chirag Durham MD               Age: 38 y.o. Sex: female     DISPOSITION Decision To Discharge 03/02/2023 03:18:24 PM       ICD-10-CM    1. Urticaria  L50.9       2. Allergic reaction to drug, initial encounter  [address]  Complexity of Problems Addressed:  1 or more acute illnesses that pose a threat to life or bodily function. Data Reviewed and Analyzed:  Category 1:     I ordered each unique test.  I reviewed the results of each unique test.        Category 2:         Category 3: Discussion of management or test interpretation. 40-year-old female presenting from the MRI department with a reaction to gadolinium contrast.  Vitally stable upon arrival.  Exam reassuring without signs of anaphylaxis. Some scattered urticaria. Will treat with IV Solu-Medrol, Pepcid, Benadryl and reevaluate. Patient's urticaria resolving. No worsening symptoms since arrival.  Remains vitally stable. Currently stable for discharge home. Advised follow-up with primary care. Return precautions discussed. Patient verbalized understanding is agreeable this plan. Risk of Complications and/or Morbidity of Patient Management:  Patient was discharged risks and benefits of hospitalization were considered          Jamilah Castañeda is a 38 y.o. female who presents to the Emergency Department with chief complaint of    Chief Complaint   Patient presents with    Allergic Reaction      Patient is a 40-year-old female presenting from the MRI department with a likely reaction to gadolinium contrast.  States she was getting a contrasted outpatient breast MRI and subsequently developed some itching. MRI tech noted some urticaria developing. Patient denies any dysphagia or dyspnea currently. Denies any difficulty handling secretions. She has no other complaints today. The history is provided by the patient.       Review of Systems   Constitutional:  Negative for chills, fatigue and fever. Eyes:  Negative for visual disturbance. Respiratory:  Negative for chest tightness and shortness of breath. Cardiovascular:  Negative for chest pain and palpitations. Gastrointestinal:  Negative for abdominal pain, diarrhea, nausea and vomiting. Genitourinary:  Negative for dysuria. Musculoskeletal:  Negative for arthralgias and myalgias. Skin:  Positive for rash. Negative for color change and wound. Neurological:  Negative for dizziness, syncope, weakness, light-headedness and headaches. All other systems reviewed and are negative. Vitals signs and nursing note reviewed. Patient Vitals for the past 4 hrs:   Pulse Resp BP   03/02/23 1515 84 17 112/77   03/02/23 1500 87 18 104/73   03/02/23 1430 94 20 117/69          Physical Exam  Vitals and nursing note reviewed. Constitutional:       General: She is not in acute distress. Appearance: Normal appearance. She is not ill-appearing. HENT:      Head: Normocephalic and atraumatic. Right Ear: External ear normal.      Left Ear: External ear normal.      Nose: Nose normal.      Mouth/Throat:      Mouth: Mucous membranes are moist.      Pharynx: Oropharynx is clear. Comments: No oropharyngeal edema. Eyes:      General: No scleral icterus. Right eye: No discharge. Left eye: No discharge. Extraocular Movements: Extraocular movements intact. Conjunctiva/sclera: Conjunctivae normal.      Pupils: Pupils are equal, round, and reactive to light. Cardiovascular:      Rate and Rhythm: Normal rate and regular rhythm. Pulses: Normal pulses. Heart sounds: Normal heart sounds. Pulmonary:      Effort: Pulmonary effort is normal.      Breath sounds: Normal breath sounds. Abdominal:      General: Abdomen is flat. Palpations: Abdomen is soft. Tenderness: There is no abdominal tenderness.    Musculoskeletal:         General: Normal range of motion.      Cervical back: Normal range of motion and neck supple.   Skin:     General: Skin is warm and dry.      Findings: Rash present.      Comments: Scattered urticaria of the left face, neck, and trunk.   Neurological:      General: No focal deficit present.      Mental Status: She is alert and oriented to person, place, and time.   Psychiatric:         Mood and Affect: Mood normal.         Behavior: Behavior normal.        Procedures          No orders of the defined types were placed in this encounter.       Medications   methylPREDNISolone sodium (SOLU-MEDROL) injection 125 mg (125 mg IntraVENous Given 3/2/23 1417)   diphenhydrAMINE (BENADRYL) injection 25 mg (25 mg IntraVENous Given 3/2/23 1416)       Discharge Medication List as of 3/2/2023  3:50 PM           Past Medical History:   Diagnosis Date    Breast cancer in female (HCC)     Gastrointestinal disorder     GERD        Past Surgical History:   Procedure Laterality Date    GYN      c section    LYMPH NODE DISSECTION N/A     OTHER SURGICAL HISTORY      double masectomy        History reviewed. No pertinent family history.     Social History     Socioeconomic History    Marital status:      Spouse name: None    Number of children: None    Years of education: None    Highest education level: None   Tobacco Use    Smoking status: Never    Smokeless tobacco: Never   Substance and Sexual Activity    Alcohol use: No    Drug use: No        Allergies: Clindamycin, Lidocaine, Lidocaine hcl, Penicillins, Prohance [gadoteridol], Iodinated contrast media, Iodine, Shellfish allergy, Adhesive tape, and Clindamycin/lincomycin    Discharge Medication List as of 3/2/2023  3:50 PM        CONTINUE these medications which have NOT CHANGED    Details   leuprolide (LUPRON) 11.25 MG injection Inject 11.25 mg into the muscle once Every 3 weeksHistorical Med      anastrozole (ARIMIDEX) 1 MG tablet Take 1 mg by mouth dailyHistorical Med      loratadine  (CLARITIN) 10 MG tablet Take 10 mg by mouth daily PRNHistorical Med      omeprazole (PRILOSEC) 40 MG delayed release capsule Take 40 mg by mouth as neededHistorical Med              Results for orders placed or performed during the hospital encounter of 03/02/23   MRI BREAST BILATERAL W WO CONTRAST    Narrative    MRI of the Breasts with and without contrast    CLINICAL INDICATION:  Further evaluation of indeterminate palpable mass  involving the right 12:00 chest wall, status post bilateral mastectomy in   May  2021 for right breast cancer, subsequent tissue expanders which were   removed,  procedures reportedly done at another facility, no reconstruction yet. COMPARISON: Ultrasound right breast earlier today. Mammogram and   ultrasound  10/5/2016. TECHNIQUE: Standard MRI sequences were obtained through the breasts in   multiple  planes. Images were obtained before and after intravenous infusion of 13   mL of  Prohance contrast. Images were reviewed with PACS and with [employer] CAD   software. FINDINGS: The breasts demonstrate bilateral mastectomy sequelae; no   residual  glandularity or significant background enhancement. Underlying the palpable marker in the right posterior 12:00 mastectomy bed   is a  small curvilinear area of nonenhancing scar tissue. Similar although   slightly  smaller finding is seen in the contralateral breast at the same level. There is no evidence of suspicious enhancing mass, and no dominant or   unique  nonmass enhancement, to suggest malignancy in either breast or the chest   wall. There is no evidence of axillary or internal mammary lymphadenopathy. Surgical  clip artifact noted in the right axilla. Elsewhere, limited visualization of the partially included thorax and   upper  abdomen shows no acute abnormality. Pectus excavatum noted. Impression    1. No MRI evidence of residual or recurrent breast malignancy.   2. Nonenhancing scar at the level of the right palpable lump. Recommend   clinical  follow-up for symptoms. 3. Bilateral expected mastectomy sequelae. Consider future breast imaging if/as clinically appropriate, given the   bilateral  mastectomies. BI-RADS Assessment Category 2: Benign finding. Results for orders placed or performed during the hospital encounter of 03/02/23   US BREAST LIMITED RIGHT    Narrative    RIGHT BREAST SONOGRAPHY:    CLINICAL HISTORY: Persistent painful, palpable right breast lump for   several  months and a 38year-old status post May 2021 bilateral mastectomies after  needle biopsy diagnosis of right breast high-grade DCIS at [facility]. The   patient  reports subsequent removal of tissue expanders without reconstruction. COMPARISON:  Reports of multiple prior examinations from [facility] without   images  for direct comparison, including right breast ultrasound of September 12, 2022,  PET/CT of June 9, 2022, and preoperative breast MRI of April 13, 2021. FINDINGS:  Images from careful ultrasound evaluation of the area of   palpable  concern at the 12:00 position of the right mastectomy bed demonstrate a  multilobular hypoechoic structure with maximum diameter 4.2 cm associated   with  posterior acoustical shadowing. It could be associated with a collapsed   capsule  from the prior tissue expander, but images from the contralateral left   breast  demonstrate no similar finding. It remains indeterminate for malignancy,   and  follow-up evaluation with MRI is indicated. Impression    PALPABLE 4.2 CM MULTILOBULAR, SHADOWING HYPOECHOIC STRUCTURE AT THE 12:00  POSITION OF THE RIGHT MASTECTOMY BED IS ASYMMETRIC COMPARED WITH THE LEFT. BENIGN AND MALIGNANT ETIOLOGIES ARE POSSIBLE, AND FOLLOW-UP BREAST MRI IS  RECOMMENDED FOR FURTHER EVALUATION AT THIS TIME. BI-RADS Assessment Category 0: Incomplete: Needs additional imaging   evaluation.   A reminder letter will be scheduled at the appropriate time pending additional  views. Primary results and recommendation were reported by Yamilka Salazar to the  voicemail in Dr. Enrico Ponce office today. The patient has been tentatively  scheduled for breast MRI on March 2, 2023 at 13:15 hours. No orders to display                     Voice dictation software was used during the making of this note. This software is not perfect and grammatical and other typographical errors may be present. This note has not been completely proofread for errors.       Crys Enrique  03/02/23 1800

## 2023-03-21 ENCOUNTER — PREP FOR PROCEDURE (OUTPATIENT)
Dept: ONCOLOGY | Age: 43
End: 2023-03-21

## 2023-03-21 PROBLEM — Z17.0 ESTROGEN RECEPTOR POSITIVE: Status: ACTIVE | Noted: 2023-03-21

## 2023-03-22 RX ORDER — CHOLECALCIFEROL (VITAMIN D3) 10(400)/ML
DROPS ORAL
COMMUNITY

## 2023-03-22 NOTE — PRE-PROCEDURE INSTRUCTIONS
Patient verified name and . Order for consent not found in EHR and matches case posting; patient verifies procedure. Type 2 surgery, phone assessment complete. Orders were not received. Labs surgeon: none at present time  Labs per anesthesia protocol: hgb, patient to come in prior to surgry at [facility] for lab    Patient answered medical/surgical history questions at their best of ability. All prior to admission medications documented in EPIC. Patient instructed to take the following medications the day of surgery according to anesthesia guidelines with a small sip of water: Omeprazole and Arimidex and Allegra if needed    . Hold all vitamins 7 days prior to surgery and NSAIDS 5 days prior to surgery. Prescription meds to hold:none am of surgery  Patient instructed on the following:    > [facility], time of arrival to be called the day before by 1700  > NPO after midnight, unless otherwise indicated, including gum, mints, and ice chips  > Responsible adult must drive patient to the hospital, stay during surgery, and patient will need supervision 24 hours after anesthesia  > Use antibacterial soap in shower the night before surgery and on the morning of surgery  > All piercings must be removed prior to arrival.    > Leave all valuables (money and jewelry) at home but bring insurance card and ID on DOS.   > You may be required to pay a deductible or co-pay on the day of your procedure. You can pre-pay by calling [phone] if your surgery is at the [facility] or [phone] if your surgery is at the [facility]. > Do not wear make-up, nail polish, lotions, cologne, perfumes, powders, or oil on skin. Artificial nails are not permitted.

## 2023-03-28 ENCOUNTER — TELEPHONE (OUTPATIENT)
Dept: ONCOLOGY | Age: 43
End: 2023-03-28

## 2023-03-28 RX ORDER — ANASTROZOLE 1 MG/1
1 TABLET ORAL DAILY
Qty: 30 TABLET | Refills: 3 | Status: SHIPPED | OUTPATIENT
Start: 2023-03-28

## 2023-03-30 RX ORDER — SODIUM CHLORIDE 0.9 % (FLUSH) 0.9 %
5-40 SYRINGE (ML) INJECTION EVERY 12 HOURS SCHEDULED
Status: CANCELLED | OUTPATIENT
Start: 2023-03-30

## 2023-03-30 RX ORDER — SODIUM CHLORIDE 0.9 % (FLUSH) 0.9 %
5-40 SYRINGE (ML) INJECTION PRN
Status: CANCELLED | OUTPATIENT
Start: 2023-03-30

## 2023-03-30 RX ORDER — HEPARIN SODIUM 5000 [USP'U]/ML
5000 INJECTION, SOLUTION INTRAVENOUS; SUBCUTANEOUS ONCE
Status: CANCELLED | OUTPATIENT
Start: 2023-04-04

## 2023-03-30 RX ORDER — SODIUM CHLORIDE 9 MG/ML
INJECTION, SOLUTION INTRAVENOUS PRN
Status: CANCELLED | OUTPATIENT
Start: 2023-03-30

## 2023-03-31 ENCOUNTER — HOSPITAL ENCOUNTER (OUTPATIENT)
Dept: LAB | Age: 43
Discharge: HOME | End: 2023-03-31
Payer: COMMERCIAL

## 2023-03-31 DIAGNOSIS — Z01.818 PRE-OP TESTING: ICD-10-CM

## 2023-03-31 LAB — HGB BLD-MCNC: 14.2 G/DL (ref 11.7–15.4)

## 2023-03-31 PROCEDURE — 85018 HEMOGLOBIN: CPT

## 2023-03-31 PROCEDURE — 36415 COLL VENOUS BLD VENIPUNCTURE: CPT

## 2023-04-03 ENCOUNTER — ANESTHESIA EVENT (OUTPATIENT)
Dept: SURGERY | Age: 43
End: 2023-04-03
Payer: COMMERCIAL

## 2023-04-03 NOTE — ANESTHESIA PRE PROCEDURE
Hives    Adhesive Tape Rash    Clindamycin/Lincomycin Rash       Problem List:    Patient Active Problem List   Diagnosis Code    Breast pain, left N64.4    Malignant neoplasm of central portion of right breast in female, estrogen [facility] C50.111, Z17.0    Estrogen receptor positive Z17.0       Past Medical History:        Diagnosis Date    Breast cancer in female [facility]) 2021    ER 93%/PR13%/HER-2 positive; s/p chemo plus Herceptin; on AI&lupron    Gastrointestinal disorder     GERD    PONV (postoperative nausea and vomiting)     usualy takes Emend prior to surgery States Zofran po does not usualy work       Past Surgical History:        Procedure Laterality Date    BREAST RECONSTRUCTION      Expander [id number]    GYN      c section    LYMPH NODE DISSECTION N/A     OTHER SURGICAL HISTORY  05/10/2021    double masectomy       Social History:    Social History     Tobacco Use    Smoking status: Never    Smokeless tobacco: Never   Substance Use Topics    Alcohol use: No                                Counseling given: Not Answered      Vital Signs (Current):   Vitals:    03/22/23 1012   Weight: 145 lb (65.8 kg)   Height: 5' 10\" (1.778 m)                                              BP Readings from Last 3 Encounters:   03/02/23 112/77   03/01/23 108/77   02/15/23 128/87       NPO Status:                                                                                 BMI:   Wt Readings from Last 3 Encounters:   03/02/23 148 lb (67.1 kg)   03/01/23 148 lb 4.8 oz (67.3 kg)   02/15/23 148 lb (67.1 kg)     Body mass index is 20.81 kg/m².     CBC:   Lab Results   Component Value Date/Time    WBC 5.4 02/15/2023 02:24 PM    RBC 4.81 02/15/2023 02:24 PM    HGB 14.2 03/31/2023 08:30 AM    HCT 42.0 02/15/2023 02:24 PM    MCV 87.3 02/15/2023 02:24 PM    RDW 12.0 02/15/2023 02:24 PM     02/15/2023 02:24 PM       CMP:   Lab Results   Component Value Date/Time     02/15/2023 02:24 PM

## 2023-04-04 ENCOUNTER — ANESTHESIA (OUTPATIENT)
Dept: SURGERY | Age: 43
End: 2023-04-04
Payer: COMMERCIAL

## 2023-04-04 ENCOUNTER — HOSPITAL ENCOUNTER (OUTPATIENT)
Age: 43
Setting detail: OBSERVATION
Discharge: HOME | End: 2023-04-05
Attending: OBSTETRICS & GYNECOLOGY | Admitting: OBSTETRICS & GYNECOLOGY
Payer: COMMERCIAL

## 2023-04-04 DIAGNOSIS — Z17.0 ESTROGEN RECEPTOR POSITIVE: ICD-10-CM

## 2023-04-04 DIAGNOSIS — Z01.818 PRE-OP TESTING: Primary | ICD-10-CM

## 2023-04-04 DIAGNOSIS — Z17.0 MALIGNANT NEOPLASM OF CENTRAL PORTION OF RIGHT BREAST IN FEMALE, ESTROGEN RECEPTOR POSITIVE (HCC): ICD-10-CM

## 2023-04-04 DIAGNOSIS — C50.111 MALIGNANT NEOPLASM OF CENTRAL PORTION OF RIGHT BREAST IN FEMALE, ESTROGEN RECEPTOR POSITIVE (HCC): ICD-10-CM

## 2023-04-04 PROBLEM — C50.919 BREAST CANCER IN FEMALE (HCC): Status: ACTIVE | Noted: 2023-04-04

## 2023-04-04 LAB
ABO + RH BLD: NORMAL
BASOPHILS # BLD: 0 K/UL (ref 0–0.2)
BASOPHILS NFR BLD: 1 % (ref 0–2)
BLOOD GROUP ANTIBODIES SERPL: NORMAL
DIFFERENTIAL METHOD BLD: NORMAL
EOSINOPHIL # BLD: 0.1 K/UL (ref 0–0.8)
EOSINOPHIL NFR BLD: 2 % (ref 0.5–7.8)
ERYTHROCYTE [DISTWIDTH] IN BLOOD BY AUTOMATED COUNT: 11.9 % (ref 11.9–14.6)
ERYTHROCYTE [DISTWIDTH] IN BLOOD BY AUTOMATED COUNT: 11.9 % (ref 11.9–14.6)
HCT VFR BLD AUTO: 38 % (ref 35.8–46.3)
HCT VFR BLD AUTO: 42.2 % (ref 35.8–46.3)
HGB BLD-MCNC: 13.1 G/DL (ref 11.7–15.4)
HGB BLD-MCNC: 14.1 G/DL (ref 11.7–15.4)
IMM GRANULOCYTES # BLD AUTO: 0 K/UL (ref 0–0.5)
IMM GRANULOCYTES NFR BLD AUTO: 0 % (ref 0–5)
LYMPHOCYTES # BLD: 1.4 K/UL (ref 0.5–4.6)
LYMPHOCYTES NFR BLD: 32 % (ref 13–44)
MCH RBC QN AUTO: 29.6 PG (ref 26.1–32.9)
MCH RBC QN AUTO: 29.9 PG (ref 26.1–32.9)
MCHC RBC AUTO-ENTMCNC: 33.4 G/DL (ref 31.4–35)
MCHC RBC AUTO-ENTMCNC: 34.5 G/DL (ref 31.4–35)
MCV RBC AUTO: 86.8 FL (ref 82–102)
MCV RBC AUTO: 88.7 FL (ref 82–102)
MONOCYTES # BLD: 0.3 K/UL (ref 0.1–1.3)
MONOCYTES NFR BLD: 7 % (ref 4–12)
NEUTS SEG # BLD: 2.5 K/UL (ref 1.7–8.2)
NEUTS SEG NFR BLD: 58 % (ref 43–78)
NRBC # BLD: 0 K/UL (ref 0–0.2)
NRBC # BLD: 0 K/UL (ref 0–0.2)
PLATELET # BLD AUTO: 159 K/UL (ref 150–450)
PLATELET # BLD AUTO: 194 K/UL (ref 150–450)
PMV BLD AUTO: 9.5 FL (ref 9.4–12.3)
PMV BLD AUTO: 9.6 FL (ref 9.4–12.3)
RBC # BLD AUTO: 4.38 M/UL (ref 4.05–5.2)
RBC # BLD AUTO: 4.76 M/UL (ref 4.05–5.2)
SPECIMEN EXP DATE BLD: NORMAL
WBC # BLD AUTO: 4.3 K/UL (ref 4.3–11.1)
WBC # BLD AUTO: 5.8 K/UL (ref 4.3–11.1)

## 2023-04-04 PROCEDURE — C1765 ADHESION BARRIER: HCPCS | Performed by: OBSTETRICS & GYNECOLOGY

## 2023-04-04 PROCEDURE — 2500000003 HC RX 250 WO HCPCS: Performed by: NURSE ANESTHETIST, CERTIFIED REGISTERED

## 2023-04-04 PROCEDURE — G0378 HOSPITAL OBSERVATION PER HR: HCPCS

## 2023-04-04 PROCEDURE — 2580000003 HC RX 258: Performed by: ANESTHESIOLOGY

## 2023-04-04 PROCEDURE — 3600000014 HC SURGERY LEVEL 4 ADDTL 15MIN: Performed by: OBSTETRICS & GYNECOLOGY

## 2023-04-04 PROCEDURE — 86900 BLOOD TYPING SEROLOGIC ABO: CPT

## 2023-04-04 PROCEDURE — 3700000000 HC ANESTHESIA ATTENDED CARE: Performed by: OBSTETRICS & GYNECOLOGY

## 2023-04-04 PROCEDURE — 3700000001 HC ADD 15 MINUTES (ANESTHESIA): Performed by: OBSTETRICS & GYNECOLOGY

## 2023-04-04 PROCEDURE — 85025 COMPLETE CBC W/AUTO DIFF WBC: CPT

## 2023-04-04 PROCEDURE — 6370000000 HC RX 637 (ALT 250 FOR IP): Performed by: OBSTETRICS & GYNECOLOGY

## 2023-04-04 PROCEDURE — 2500000003 HC RX 250 WO HCPCS

## 2023-04-04 PROCEDURE — A4216 STERILE WATER/SALINE, 10 ML: HCPCS | Performed by: OBSTETRICS & GYNECOLOGY

## 2023-04-04 PROCEDURE — 2500000003 HC RX 250 WO HCPCS: Performed by: OBSTETRICS & GYNECOLOGY

## 2023-04-04 PROCEDURE — 6360000002 HC RX W HCPCS: Performed by: OBSTETRICS & GYNECOLOGY

## 2023-04-04 PROCEDURE — 6360000002 HC RX W HCPCS: Performed by: ANESTHESIOLOGY

## 2023-04-04 PROCEDURE — 7100000001 HC PACU RECOVERY - ADDTL 15 MIN: Performed by: OBSTETRICS & GYNECOLOGY

## 2023-04-04 PROCEDURE — 6360000002 HC RX W HCPCS: Performed by: NURSE ANESTHETIST, CERTIFIED REGISTERED

## 2023-04-04 PROCEDURE — 2720000010 HC SURG SUPPLY STERILE: Performed by: OBSTETRICS & GYNECOLOGY

## 2023-04-04 PROCEDURE — 7100000000 HC PACU RECOVERY - FIRST 15 MIN: Performed by: OBSTETRICS & GYNECOLOGY

## 2023-04-04 PROCEDURE — 6370000000 HC RX 637 (ALT 250 FOR IP): Performed by: ANESTHESIOLOGY

## 2023-04-04 PROCEDURE — 2580000003 HC RX 258: Performed by: OBSTETRICS & GYNECOLOGY

## 2023-04-04 PROCEDURE — 64488 TAP BLOCK BI INJECTION: CPT | Performed by: ANESTHESIOLOGY

## 2023-04-04 PROCEDURE — 3600000004 HC SURGERY LEVEL 4 BASE: Performed by: OBSTETRICS & GYNECOLOGY

## 2023-04-04 PROCEDURE — 2709999900 HC NON-CHARGEABLE SUPPLY: Performed by: OBSTETRICS & GYNECOLOGY

## 2023-04-04 PROCEDURE — 85027 COMPLETE CBC AUTOMATED: CPT

## 2023-04-04 PROCEDURE — 88307 TISSUE EXAM BY PATHOLOGIST: CPT

## 2023-04-04 RX ORDER — KETAMINE HYDROCHLORIDE 50 MG/ML
INJECTION, SOLUTION, CONCENTRATE INTRAMUSCULAR; INTRAVENOUS PRN
Status: DISCONTINUED | OUTPATIENT
Start: 2023-04-04 | End: 2023-04-04 | Stop reason: SDUPTHER

## 2023-04-04 RX ORDER — ONDANSETRON 2 MG/ML
4 INJECTION INTRAMUSCULAR; INTRAVENOUS EVERY 6 HOURS PRN
Status: DISCONTINUED | OUTPATIENT
Start: 2023-04-04 | End: 2023-04-05 | Stop reason: HOSPADM

## 2023-04-04 RX ORDER — MORPHINE SULFATE 4 MG/ML
4 INJECTION INTRAVENOUS
Status: DISCONTINUED | OUTPATIENT
Start: 2023-04-04 | End: 2023-04-05 | Stop reason: HOSPADM

## 2023-04-04 RX ORDER — FAMOTIDINE 20 MG/1
20 TABLET, FILM COATED ORAL 2 TIMES DAILY
Status: DISCONTINUED | OUTPATIENT
Start: 2023-04-04 | End: 2023-04-05 | Stop reason: HOSPADM

## 2023-04-04 RX ORDER — LIDOCAINE HYDROCHLORIDE 10 MG/ML
1 INJECTION, SOLUTION INFILTRATION; PERINEURAL
Status: DISCONTINUED | OUTPATIENT
Start: 2023-04-04 | End: 2023-04-04 | Stop reason: HOSPADM

## 2023-04-04 RX ORDER — ONDANSETRON 4 MG/1
4 TABLET, ORALLY DISINTEGRATING ORAL EVERY 8 HOURS PRN
Status: DISCONTINUED | OUTPATIENT
Start: 2023-04-04 | End: 2023-04-05 | Stop reason: HOSPADM

## 2023-04-04 RX ORDER — HEPARIN SODIUM 5000 [USP'U]/ML
5000 INJECTION, SOLUTION INTRAVENOUS; SUBCUTANEOUS ONCE
Status: COMPLETED | OUTPATIENT
Start: 2023-04-04 | End: 2023-04-04

## 2023-04-04 RX ORDER — OXYCODONE HYDROCHLORIDE 5 MG/1
5 TABLET ORAL EVERY 6 HOURS PRN
Qty: 20 TABLET | Refills: 0 | Status: SHIPPED | OUTPATIENT
Start: 2023-04-04 | End: 2023-04-07

## 2023-04-04 RX ORDER — IBUPROFEN 400 MG/1
800 TABLET ORAL EVERY 8 HOURS PRN
Status: DISCONTINUED | OUTPATIENT
Start: 2023-04-04 | End: 2023-04-05 | Stop reason: HOSPADM

## 2023-04-04 RX ORDER — SODIUM CHLORIDE, SODIUM LACTATE, POTASSIUM CHLORIDE, CALCIUM CHLORIDE 600; 310; 30; 20 MG/100ML; MG/100ML; MG/100ML; MG/100ML
INJECTION, SOLUTION INTRAVENOUS CONTINUOUS
Status: DISCONTINUED | OUTPATIENT
Start: 2023-04-04 | End: 2023-04-05 | Stop reason: HOSPADM

## 2023-04-04 RX ORDER — NEOSTIGMINE METHYLSULFATE 1 MG/ML
INJECTION, SOLUTION INTRAVENOUS PRN
Status: DISCONTINUED | OUTPATIENT
Start: 2023-04-04 | End: 2023-04-04 | Stop reason: SDUPTHER

## 2023-04-04 RX ORDER — ROCURONIUM BROMIDE 10 MG/ML
INJECTION, SOLUTION INTRAVENOUS PRN
Status: DISCONTINUED | OUTPATIENT
Start: 2023-04-04 | End: 2023-04-04 | Stop reason: SDUPTHER

## 2023-04-04 RX ORDER — DEXAMETHASONE SODIUM PHOSPHATE 10 MG/ML
INJECTION, SOLUTION INTRAMUSCULAR; INTRAVENOUS
Status: DISCONTINUED | OUTPATIENT
Start: 2023-04-04 | End: 2023-04-04 | Stop reason: SDUPTHER

## 2023-04-04 RX ORDER — ROPIVACAINE HYDROCHLORIDE 5 MG/ML
INJECTION, SOLUTION EPIDURAL; INFILTRATION; PERINEURAL
Status: DISCONTINUED | OUTPATIENT
Start: 2023-04-04 | End: 2023-04-04 | Stop reason: SDUPTHER

## 2023-04-04 RX ORDER — HYDROMORPHONE HYDROCHLORIDE 2 MG/ML
0.5 INJECTION, SOLUTION INTRAMUSCULAR; INTRAVENOUS; SUBCUTANEOUS EVERY 5 MIN PRN
Status: COMPLETED | OUTPATIENT
Start: 2023-04-04 | End: 2023-04-04

## 2023-04-04 RX ORDER — DEXAMETHASONE SODIUM PHOSPHATE 4 MG/ML
INJECTION, SOLUTION INTRA-ARTICULAR; INTRALESIONAL; INTRAMUSCULAR; INTRAVENOUS; SOFT TISSUE PRN
Status: DISCONTINUED | OUTPATIENT
Start: 2023-04-04 | End: 2023-04-04 | Stop reason: SDUPTHER

## 2023-04-04 RX ORDER — FENTANYL CITRATE 50 UG/ML
100 INJECTION, SOLUTION INTRAMUSCULAR; INTRAVENOUS PRN
Status: DISCONTINUED | OUTPATIENT
Start: 2023-04-04 | End: 2023-04-04 | Stop reason: HOSPADM

## 2023-04-04 RX ORDER — ONDANSETRON 2 MG/ML
INJECTION INTRAMUSCULAR; INTRAVENOUS PRN
Status: DISCONTINUED | OUTPATIENT
Start: 2023-04-04 | End: 2023-04-04 | Stop reason: SDUPTHER

## 2023-04-04 RX ORDER — SODIUM CHLORIDE 0.9 % (FLUSH) 0.9 %
5-40 SYRINGE (ML) INJECTION PRN
Status: DISCONTINUED | OUTPATIENT
Start: 2023-04-04 | End: 2023-04-05 | Stop reason: HOSPADM

## 2023-04-04 RX ORDER — ANASTROZOLE 1 MG/1
1 TABLET ORAL DAILY
Status: DISCONTINUED | OUTPATIENT
Start: 2023-04-04 | End: 2023-04-05 | Stop reason: HOSPADM

## 2023-04-04 RX ORDER — ONDANSETRON 2 MG/ML
4 INJECTION INTRAMUSCULAR; INTRAVENOUS
Status: COMPLETED | OUTPATIENT
Start: 2023-04-04 | End: 2023-04-04

## 2023-04-04 RX ORDER — SODIUM CHLORIDE 0.9 % (FLUSH) 0.9 %
5-40 SYRINGE (ML) INJECTION EVERY 12 HOURS SCHEDULED
Status: DISCONTINUED | OUTPATIENT
Start: 2023-04-04 | End: 2023-04-04 | Stop reason: HOSPADM

## 2023-04-04 RX ORDER — OXYCODONE HYDROCHLORIDE 5 MG/1
5 TABLET ORAL EVERY 4 HOURS PRN
Status: DISCONTINUED | OUTPATIENT
Start: 2023-04-04 | End: 2023-04-05 | Stop reason: HOSPADM

## 2023-04-04 RX ORDER — CETIRIZINE HYDROCHLORIDE 10 MG/1
10 TABLET ORAL DAILY
Status: DISCONTINUED | OUTPATIENT
Start: 2023-04-05 | End: 2023-04-05 | Stop reason: HOSPADM

## 2023-04-04 RX ORDER — SODIUM CHLORIDE 0.9 % (FLUSH) 0.9 %
5-40 SYRINGE (ML) INJECTION PRN
Status: DISCONTINUED | OUTPATIENT
Start: 2023-04-04 | End: 2023-04-04 | Stop reason: HOSPADM

## 2023-04-04 RX ORDER — SENNA AND DOCUSATE SODIUM 50; 8.6 MG/1; MG/1
1 TABLET, FILM COATED ORAL DAILY
Qty: 30 TABLET | Refills: 0 | Status: SHIPPED | OUTPATIENT
Start: 2023-04-04

## 2023-04-04 RX ORDER — LORAZEPAM 0.5 MG/1
0.5 TABLET ORAL EVERY 6 HOURS PRN
Qty: 10 TABLET | Refills: 0 | Status: SHIPPED | OUTPATIENT
Start: 2023-04-04 | End: 2023-05-04

## 2023-04-04 RX ORDER — EPHEDRINE SULFATE/0.9% NACL/PF 50 MG/5 ML
SYRINGE (ML) INTRAVENOUS PRN
Status: DISCONTINUED | OUTPATIENT
Start: 2023-04-04 | End: 2023-04-04 | Stop reason: SDUPTHER

## 2023-04-04 RX ORDER — KETOROLAC TROMETHAMINE 30 MG/ML
INJECTION, SOLUTION INTRAMUSCULAR; INTRAVENOUS PRN
Status: DISCONTINUED | OUTPATIENT
Start: 2023-04-04 | End: 2023-04-04 | Stop reason: SDUPTHER

## 2023-04-04 RX ORDER — SODIUM CHLORIDE 9 MG/ML
INJECTION, SOLUTION INTRAVENOUS PRN
Status: DISCONTINUED | OUTPATIENT
Start: 2023-04-04 | End: 2023-04-05 | Stop reason: HOSPADM

## 2023-04-04 RX ORDER — SODIUM CHLORIDE 9 MG/ML
INJECTION, SOLUTION INTRAVENOUS PRN
Status: DISCONTINUED | OUTPATIENT
Start: 2023-04-04 | End: 2023-04-04 | Stop reason: HOSPADM

## 2023-04-04 RX ORDER — PANTOPRAZOLE SODIUM 40 MG/1
40 TABLET, DELAYED RELEASE ORAL
Status: DISCONTINUED | OUTPATIENT
Start: 2023-04-05 | End: 2023-04-05 | Stop reason: HOSPADM

## 2023-04-04 RX ORDER — GLYCOPYRROLATE 0.2 MG/ML
INJECTION INTRAMUSCULAR; INTRAVENOUS PRN
Status: DISCONTINUED | OUTPATIENT
Start: 2023-04-04 | End: 2023-04-04 | Stop reason: SDUPTHER

## 2023-04-04 RX ORDER — FENTANYL CITRATE 50 UG/ML
INJECTION, SOLUTION INTRAMUSCULAR; INTRAVENOUS PRN
Status: DISCONTINUED | OUTPATIENT
Start: 2023-04-04 | End: 2023-04-04 | Stop reason: SDUPTHER

## 2023-04-04 RX ORDER — APREPITANT 40 MG/1
40 CAPSULE ORAL ONCE
Status: COMPLETED | OUTPATIENT
Start: 2023-04-04 | End: 2023-04-04

## 2023-04-04 RX ORDER — SODIUM CHLORIDE, SODIUM LACTATE, POTASSIUM CHLORIDE, CALCIUM CHLORIDE 600; 310; 30; 20 MG/100ML; MG/100ML; MG/100ML; MG/100ML
INJECTION, SOLUTION INTRAVENOUS CONTINUOUS
Status: DISCONTINUED | OUTPATIENT
Start: 2023-04-04 | End: 2023-04-04 | Stop reason: HOSPADM

## 2023-04-04 RX ORDER — OXYCODONE HYDROCHLORIDE 5 MG/1
5 TABLET ORAL
Status: COMPLETED | OUTPATIENT
Start: 2023-04-04 | End: 2023-04-04

## 2023-04-04 RX ORDER — FENTANYL CITRATE 50 UG/ML
50 INJECTION, SOLUTION INTRAMUSCULAR; INTRAVENOUS PRN
Status: DISCONTINUED | OUTPATIENT
Start: 2023-04-04 | End: 2023-04-04 | Stop reason: HOSPADM

## 2023-04-04 RX ORDER — MAGNESIUM HYDROXIDE/ALUMINUM HYDROXICE/SIMETHICONE 120; 1200; 1200 MG/30ML; MG/30ML; MG/30ML
15 SUSPENSION ORAL EVERY 6 HOURS PRN
Status: DISCONTINUED | OUTPATIENT
Start: 2023-04-04 | End: 2023-04-05 | Stop reason: HOSPADM

## 2023-04-04 RX ORDER — PROCHLORPERAZINE MALEATE 10 MG
10 TABLET ORAL EVERY 6 HOURS PRN
Qty: 120 TABLET | Refills: 0 | Status: SHIPPED | OUTPATIENT
Start: 2023-04-04 | End: 2023-04-24

## 2023-04-04 RX ORDER — 0.9 % SODIUM CHLORIDE 0.9 %
500 INTRAVENOUS SOLUTION INTRAVENOUS ONCE
Status: COMPLETED | OUTPATIENT
Start: 2023-04-04 | End: 2023-04-04

## 2023-04-04 RX ORDER — MIDAZOLAM HYDROCHLORIDE 2 MG/2ML
2 INJECTION, SOLUTION INTRAMUSCULAR; INTRAVENOUS
Status: COMPLETED | OUTPATIENT
Start: 2023-04-04 | End: 2023-04-04

## 2023-04-04 RX ORDER — FAMOTIDINE 10 MG/ML
INJECTION, SOLUTION INTRAVENOUS
Status: COMPLETED
Start: 2023-04-04 | End: 2023-04-04

## 2023-04-04 RX ORDER — IBUPROFEN 800 MG/1
800 TABLET ORAL EVERY 8 HOURS PRN
Qty: 30 TABLET | Refills: 0 | Status: SHIPPED | OUTPATIENT
Start: 2023-04-04

## 2023-04-04 RX ORDER — METOCLOPRAMIDE 5 MG/1
5 TABLET ORAL 3 TIMES DAILY
Qty: 30 TABLET | Refills: 0 | Status: SHIPPED | OUTPATIENT
Start: 2023-04-04 | End: 2023-04-14

## 2023-04-04 RX ORDER — SODIUM CHLORIDE 0.9 % (FLUSH) 0.9 %
5-40 SYRINGE (ML) INJECTION EVERY 12 HOURS SCHEDULED
Status: DISCONTINUED | OUTPATIENT
Start: 2023-04-04 | End: 2023-04-05 | Stop reason: HOSPADM

## 2023-04-04 RX ORDER — PROPOFOL 10 MG/ML
INJECTION, EMULSION INTRAVENOUS PRN
Status: DISCONTINUED | OUTPATIENT
Start: 2023-04-04 | End: 2023-04-04 | Stop reason: SDUPTHER

## 2023-04-04 RX ADMIN — SODIUM CHLORIDE, PRESERVATIVE FREE 10 ML: 5 INJECTION INTRAVENOUS at 21:36

## 2023-04-04 RX ADMIN — ROCURONIUM BROMIDE 10 MG: 50 INJECTION, SOLUTION INTRAVENOUS at 08:29

## 2023-04-04 RX ADMIN — ONDANSETRON 4 MG: 2 INJECTION INTRAMUSCULAR; INTRAVENOUS at 21:35

## 2023-04-04 RX ADMIN — OXYCODONE 5 MG: 5 TABLET ORAL at 13:04

## 2023-04-04 RX ADMIN — GLYCOPYRROLATE 0.4 MG: 0.2 INJECTION INTRAMUSCULAR; INTRAVENOUS at 09:13

## 2023-04-04 RX ADMIN — ANASTROZOLE 1 MG: 1 TABLET, COATED ORAL at 13:24

## 2023-04-04 RX ADMIN — ROCURONIUM BROMIDE 40 MG: 50 INJECTION, SOLUTION INTRAVENOUS at 08:10

## 2023-04-04 RX ADMIN — HYDROMORPHONE HYDROCHLORIDE 0.5 MG: 2 INJECTION, SOLUTION INTRAMUSCULAR; INTRAVENOUS; SUBCUTANEOUS at 13:03

## 2023-04-04 RX ADMIN — KETAMINE HYDROCHLORIDE 10 MG: 50 INJECTION, SOLUTION INTRAMUSCULAR; INTRAVENOUS at 09:00

## 2023-04-04 RX ADMIN — KETOROLAC TROMETHAMINE 30 MG: 30 INJECTION, SOLUTION INTRAMUSCULAR at 09:39

## 2023-04-04 RX ADMIN — HYDROMORPHONE HYDROCHLORIDE 0.5 MG: 2 INJECTION, SOLUTION INTRAMUSCULAR; INTRAVENOUS; SUBCUTANEOUS at 10:18

## 2023-04-04 RX ADMIN — ONDANSETRON 4 MG: 2 INJECTION INTRAMUSCULAR; INTRAVENOUS at 09:00

## 2023-04-04 RX ADMIN — Medication 2000 MG: at 08:20

## 2023-04-04 RX ADMIN — Medication 10 MG: at 08:38

## 2023-04-04 RX ADMIN — HEPARIN SODIUM 5000 UNITS: 5000 INJECTION INTRAVENOUS; SUBCUTANEOUS at 07:03

## 2023-04-04 RX ADMIN — SODIUM CHLORIDE, SODIUM LACTATE, POTASSIUM CHLORIDE, AND CALCIUM CHLORIDE: 600; 310; 30; 20 INJECTION, SOLUTION INTRAVENOUS at 07:04

## 2023-04-04 RX ADMIN — MIDAZOLAM 2 MG: 1 INJECTION INTRAMUSCULAR; INTRAVENOUS at 07:48

## 2023-04-04 RX ADMIN — DEXAMETHASONE SODIUM PHOSPHATE 8 MG: 10 INJECTION, SOLUTION INTRAMUSCULAR; INTRAVENOUS at 09:28

## 2023-04-04 RX ADMIN — Medication 15 MG: at 08:24

## 2023-04-04 RX ADMIN — FENTANYL CITRATE 50 MCG: 50 INJECTION, SOLUTION INTRAMUSCULAR; INTRAVENOUS at 08:25

## 2023-04-04 RX ADMIN — MORPHINE SULFATE 4 MG: 4 INJECTION INTRAVENOUS at 16:36

## 2023-04-04 RX ADMIN — DEXAMETHASONE SODIUM PHOSPHATE 4 MG: 4 INJECTION, SOLUTION INTRAMUSCULAR; INTRAVENOUS at 08:56

## 2023-04-04 RX ADMIN — Medication 3 MG: at 09:13

## 2023-04-04 RX ADMIN — FENTANYL CITRATE 50 MCG: 50 INJECTION, SOLUTION INTRAMUSCULAR; INTRAVENOUS at 08:10

## 2023-04-04 RX ADMIN — ROPIVACAINE HYDROCHLORIDE 20 ML: 5 INJECTION, SOLUTION EPIDURAL; INFILTRATION; PERINEURAL at 09:28

## 2023-04-04 RX ADMIN — PROPOFOL 200 MG: 10 INJECTION, EMULSION INTRAVENOUS at 08:10

## 2023-04-04 RX ADMIN — IBUPROFEN 800 MG: 800 TABLET, FILM COATED ORAL at 15:40

## 2023-04-04 RX ADMIN — FAMOTIDINE 20 MG: 10 INJECTION, SOLUTION INTRAVENOUS at 07:48

## 2023-04-04 RX ADMIN — FAMOTIDINE 20 MG: 10 INJECTION, SOLUTION INTRAVENOUS at 21:35

## 2023-04-04 RX ADMIN — HYDROMORPHONE HYDROCHLORIDE 0.5 MG: 2 INJECTION, SOLUTION INTRAMUSCULAR; INTRAVENOUS; SUBCUTANEOUS at 10:08

## 2023-04-04 RX ADMIN — KETAMINE HYDROCHLORIDE 30 MG: 50 INJECTION, SOLUTION INTRAMUSCULAR; INTRAVENOUS at 08:20

## 2023-04-04 RX ADMIN — ONDANSETRON 4 MG: 2 INJECTION INTRAMUSCULAR; INTRAVENOUS at 16:35

## 2023-04-04 RX ADMIN — SODIUM CHLORIDE 500 ML: 9 INJECTION, SOLUTION INTRAVENOUS at 16:36

## 2023-04-04 RX ADMIN — APREPITANT 40 MG: 40 CAPSULE ORAL at 07:03

## 2023-04-04 RX ADMIN — HYDROMORPHONE HYDROCHLORIDE 0.5 MG: 2 INJECTION, SOLUTION INTRAMUSCULAR; INTRAVENOUS; SUBCUTANEOUS at 11:04

## 2023-04-04 RX ADMIN — Medication 15 MG: at 08:15

## 2023-04-04 RX ADMIN — SODIUM CHLORIDE, SODIUM LACTATE, POTASSIUM CHLORIDE, AND CALCIUM CHLORIDE: 600; 310; 30; 20 INJECTION, SOLUTION INTRAVENOUS at 08:49

## 2023-04-04 RX ADMIN — ONDANSETRON 4 MG: 2 INJECTION INTRAMUSCULAR; INTRAVENOUS at 10:25

## 2023-04-04 ASSESSMENT — PAIN SCALES - GENERAL
PAINLEVEL_OUTOF10: 7
PAINLEVEL_OUTOF10: 6
PAINLEVEL_OUTOF10: 2
PAINLEVEL_OUTOF10: 7
PAINLEVEL_OUTOF10: 7
PAINLEVEL_OUTOF10: 0
PAINLEVEL_OUTOF10: 7
PAINLEVEL_OUTOF10: 7
PAINLEVEL_OUTOF10: 4

## 2023-04-04 ASSESSMENT — PAIN DESCRIPTION - ORIENTATION: ORIENTATION: LOWER

## 2023-04-04 ASSESSMENT — PAIN DESCRIPTION - LOCATION
LOCATION: ABDOMEN

## 2023-04-04 ASSESSMENT — PAIN - FUNCTIONAL ASSESSMENT: PAIN_FUNCTIONAL_ASSESSMENT: 0-10

## 2023-04-04 ASSESSMENT — PAIN DESCRIPTION - DESCRIPTORS: DESCRIPTORS: ACHING

## 2023-04-04 NOTE — OP NOTE
bilaterally. They were grasped at the level of the internal os with an Enseal, cauterized, and transected. This was continued to the level of the external cervical os. Clamps placed across the vaginal apex. Specimen was removed. Anvik closed laterally with Telly, midline with figure-of-eight sutures. Irrigation was performed and hemostasis was confirmed at all operative sites. The bladder was filled. No extravasation issues noted. Royal was removed. Hemostatic product was placed over the denuded pelvis. Bowel was allowed to fall to its normal location after Seprafilm was placed between the operative site and bowel. The intraperitoneum was closed with Vicryl. Hemostasis was assured on the rectus muscle and then the fascia was reapproximated with looped PDS. Subcutaneous tissue was irrigated, hemostasis obtained, reapproximated in two layers with Vicryl followed by subcuticular and Dermabond. Sponge, lap and needle counts were correct. The patient tolerated the procedure well, was taken to PACU stable per Anesthesia.       Mildred Sanchez MD      [id number]  D:  04/04/2023 9:40  T:  04/04/2023 13:52  JOB #:  [id number]

## 2023-04-04 NOTE — PERIOP NOTE
TRANSFER - OUT REPORT:    Verbal report given to 5th floor RN on Mart Pearce  being transferred to [facility] for routine post-op       Report consisted of patients Situation, Background, Assessment and   Recommendations(SBAR). Information from the following report(s) Adult Overview, Surgery Report, Intake/Output, and MAR was reviewed with the receiving nurse. Lines:   Peripheral IV 04/04/23 Distal;Left;Dorsal Forearm (Active)   Site Assessment Clean, dry & intact 04/04/23 1147   Line Status Flushed;Capped 04/04/23 1147   Line Care Connections checked and tightened 04/04/23 1147   Phlebitis Assessment No symptoms 04/04/23 1147   Infiltration Assessment 0 04/04/23 1147   Dressing Status Clean, dry & intact 04/04/23 1147   Dressing Type Transparent 04/04/23 1147        Opportunity for questions and clarification was provided. Patient transported with:  transport    VTE prophylaxis orders have been written for Mart Pearce. Patient and family given floor number and nurses name.

## 2023-04-04 NOTE — ANESTHESIA POSTPROCEDURE EVALUATION
Department of Anesthesiology  Postprocedure Note    Patient: Geneva Mon  MRN: [id number]  YOB: 1980  Date of evaluation: 4/4/2023      Procedure Summary     Date: 04/04/23 Room / Location: [facility] MAIN OR [address] [place] [address] MAIN OR    Anesthesia Start: 0803 Anesthesia Stop: 5385    Procedure: HYSTERECTOMY ABDOMINAL TOTAL BILATERAL SALPINGO-OOPHORECTOMY (Abdomen) Diagnosis:       Malignant neoplasm of central portion of right female breast (HCC)      Estrogen receptor positive      (Malignant neoplasm of central portion of right female breast ([address] [C50.111])      (Estrogen receptor positive [Z17.0])    Providers: Maya Capone MD Responsible Provider: Rolando Borden MD    Anesthesia Type: General ASA Status: 2          Anesthesia Type: General    Adeola Phase I: Adeola Score: 8    Adeola Phase II:        Anesthesia Post Evaluation    Patient location during evaluation: PACU  Patient participation: complete - patient participated  Level of consciousness: awake and alert  Pain score: 2  Airway patency: patent  Nausea & Vomiting: no nausea  Complications: no  Cardiovascular status: blood pressure returned to baseline and hemodynamically stable  Respiratory status: acceptable  Hydration status: euvolemic  Multimodal analgesia pain management approach  There was medical reason for not using a multimodal analgesia pain management approach.
Never

## 2023-04-04 NOTE — BRIEF OP NOTE
Brief Postoperative Note      Patient: Destinee Grande  YOB: 1980  MRN: [id number]    Date of Procedure: 4/4/2023    Pre-Op Diagnosis: Malignant neoplasm of central portion of right female breast ([address] [C50.111]  Estrogen receptor positive [Z17.0]    Post-Op Diagnosis: Same       Procedure(s): HYSTERECTOMY ABDOMINAL TOTAL BILATERAL SALPINGO-OOPHORECTOMY    Surgeon(s):  Aravind Bray MD    Assistant:  * No surgical staff found *    Anesthesia: General    Estimated Blood Loss (mL): less than 50     Complications: None    Specimens:   ID Type Source Tests Collected by Time Destination   A : Uterus, Tubes, Ovaries  Tissue Uterus SURGICAL PATHOLOGY Aravind Bray MD 4/4/2023 7334        Implants:  * No implants in log *      Drains:   [REMOVED] Urinary Catheter 04/04/23 2 Way; Royal (Removed)       Findings: dion    Electronically signed by Aravind Bray MD on 4/4/2023 at 9:22 AM

## 2023-04-04 NOTE — ANESTHESIA PROCEDURE NOTES
Peripheral Block    Patient location during procedure: OR  Reason for block: post-op pain management and at surgeon's request  Start time: 4/4/2023 9:28 AM  End time: 4/4/2023 9:35 AM  Staffing  Performed: anesthesiologist   Anesthesiologist: Rao Hodge MD  Preanesthetic Checklist  Completed: patient identified, IV checked, site marked, risks and benefits discussed, surgical/procedural consents, equipment checked, pre-op evaluation, timeout performed, anesthesia consent given, oxygen available, monitors applied/VS acknowledged, fire risk safety assessment completed and verbalized and blood product R/B/A discussed and consented  Peripheral Block   Patient position: supine  Prep: ChloraPrep  Provider prep: mask and sterile gloves  Patient monitoring: oxygen, IV access, frequent blood pressure checks, continuous capnometry, continuous pulse ox and cardiac monitor  Block type: TAP  Laterality: bilateral  Injection technique: single-shot  Guidance: ultrasound guided    Needle   Needle type: insulated echogenic nerve stimulator needle   Needle gauge: 21 G  Needle localization: ultrasound guidance  Needle length: 10 cm  Assessment   Injection assessment: no intravascular symptoms, local visualized surrounding nerve on ultrasound and negative aspiration for heme  Paresthesia pain: none  Slow fractionated injection: yes  Hemodynamics: stable  Real-time US image taken/store: yes  Outcomes: uncomplicated    Medications Administered  ropivacaine (NAROPIN) injection 0.5% - Perineural   20 mL - 4/4/2023 9:28:00 AM  dexamethasone (DECADRON) (PF) 10 mg/mL injection - Other   8 mg - 4/4/2023 9:28:00 AM

## 2023-04-04 NOTE — ANESTHESIA PROCEDURE NOTES
Airway  Date/Time: 4/4/2023 8:12 AM  Urgency: elective    Airway not difficult    General Information and Staff    Patient location during procedure: OR  Resident/CRNA: DAVID Alvarenga - CRNA    Indications and Patient Condition  Indications for airway management: anesthesia and airway protection  Spontaneous Ventilation: absent  Sedation level: deep  Preoxygenated: yes  Patient position: sniffing  MILS maintained throughout  Mask difficulty assessment: vent by bag mask    Final Airway Details  Final airway type: endotracheal airway      Successful airway: ETT  Cuffed: yes   Successful intubation technique: direct laryngoscopy  Endotracheal tube insertion site: oral  Blade: David  Blade size: #3  ETT size (mm): 7.0  Cormack-Lehane Classification: grade I - full view of glottis  Placement verified by: chest auscultation and capnometry   Measured from: lips  ETT to lips (cm): 23  Number of attempts at approach: 1  Ventilation between attempts: bag mask  Number of other approaches attempted: 1    no

## 2023-04-04 NOTE — PERIOP NOTE
Patient has spoken with [facility] STACIE. Consent has been verified, signed and witnessed by this RN. 2 mg of Versed given SIVP per orders. Pulse ox monitor in place & tracing appropriately. Bed in low, locked position with side rails up x 2 and call light in reach. Instructed pt to call with any needs and to remain in bed. Verbalizes understanding.  at bedside.

## 2023-04-05 ENCOUNTER — TELEPHONE (OUTPATIENT)
Dept: ONCOLOGY | Age: 43
End: 2023-04-05

## 2023-04-05 VITALS
RESPIRATION RATE: 18 BRPM | SYSTOLIC BLOOD PRESSURE: 128 MMHG | HEART RATE: 100 BPM | OXYGEN SATURATION: 100 % | TEMPERATURE: 98.6 F | BODY MASS INDEX: 20.83 KG/M2 | HEIGHT: 70 IN | DIASTOLIC BLOOD PRESSURE: 79 MMHG | WEIGHT: 145.5 LBS

## 2023-04-05 LAB
HCT VFR BLD AUTO: 40.5 % (ref 35.8–46.3)
HGB BLD-MCNC: 13.6 G/DL (ref 11.7–15.4)

## 2023-04-05 PROCEDURE — 6370000000 HC RX 637 (ALT 250 FOR IP): Performed by: OBSTETRICS & GYNECOLOGY

## 2023-04-05 PROCEDURE — 36415 COLL VENOUS BLD VENIPUNCTURE: CPT

## 2023-04-05 PROCEDURE — 2580000003 HC RX 258: Performed by: OBSTETRICS & GYNECOLOGY

## 2023-04-05 PROCEDURE — 85014 HEMATOCRIT: CPT

## 2023-04-05 PROCEDURE — G0378 HOSPITAL OBSERVATION PER HR: HCPCS

## 2023-04-05 RX ORDER — ONDANSETRON 4 MG/1
4 TABLET, ORALLY DISINTEGRATING ORAL EVERY 8 HOURS PRN
Qty: 202 TABLET | Refills: 1 | Status: SHIPPED | OUTPATIENT
Start: 2023-04-05

## 2023-04-05 RX ADMIN — FAMOTIDINE 20 MG: 20 TABLET, FILM COATED ORAL at 08:07

## 2023-04-05 RX ADMIN — ANASTROZOLE 1 MG: 1 TABLET, COATED ORAL at 08:08

## 2023-04-05 RX ADMIN — PANTOPRAZOLE SODIUM 40 MG: 40 TABLET, DELAYED RELEASE ORAL at 06:06

## 2023-04-05 RX ADMIN — SODIUM CHLORIDE, PRESERVATIVE FREE 10 ML: 5 INJECTION INTRAVENOUS at 08:41

## 2023-04-05 RX ADMIN — IBUPROFEN 800 MG: 800 TABLET, FILM COATED ORAL at 06:19

## 2023-04-05 RX ADMIN — CETIRIZINE HYDROCHLORIDE 10 MG: 10 TABLET, FILM COATED ORAL at 08:07

## 2023-04-05 ASSESSMENT — ENCOUNTER SYMPTOMS
VOMITING: 0
NAUSEA: 1

## 2023-04-05 NOTE — TELEPHONE ENCOUNTER
Msg to [facility]. No ABX Needed.    Call to Ronaldo Wang [phone] and laura Wilks understanding of instructions

## 2023-04-05 NOTE — TELEPHONE ENCOUNTER
Pt  had hysterectomy surgery on 04/04 by Dr. Francis Metcalf & spouse want to verify if pt need to be prescribed some type of Antibiotics.

## 2023-04-05 NOTE — DISCHARGE SUMMARY
35.8 - 46.3 %      Status: Final  MCV                                           Date: 04/04/2023  Value: 88.7        Ref range: 82 - 102 FL        Status: Final  MCH                                           Date: 04/04/2023  Value: 29.6        Ref range: 26.1 - 32.9 PG     Status: Final  MCHC                                          Date: 04/04/2023  Value: 33.4        Ref range: 31.4 - 35.0 g/dL   Status: Final  RDW                                           Date: 04/04/2023  Value: 11.9        Ref range: 11.9 - 14.6 %      Status: Final  Platelets                                     Date: 04/04/2023  Value: 194         Ref range: 150 - 450 K/uL     Status: Final  MPV                                           Date: 04/04/2023  Value: 9.6         Ref range: 9.4 - 12.3 FL      Status: Final  nRBC                                          Date: 04/04/2023  Value: 0.00        Ref range: 0.0 - 0.2 K/uL     Status: Final                Comment: **Note: Absolute NRBC parameter is now reported with Hemogram**  Differential Type                             Date: 04/04/2023  Value: AUTOMATED   Ref range:                    Status: Final  Seg Neutrophils                               Date: 04/04/2023  Value: 58          Ref range: 43 - 78 %          Status: Final  Lymphocytes                                   Date: 04/04/2023  Value: 32          Ref range: 13 - 44 %          Status: Final  Monocytes                                     Date: 04/04/2023  Value: 7           Ref range: 4.0 - 12.0 %       Status: Final  Eosinophils %                                 Date: 04/04/2023  Value: 2           Ref range: 0.5 - 7.8 %        Status: Final  Basophils                                     Date: 04/04/2023  Value: 1           Ref range: 0.0 - 2.0 %        Status: Final  Immature Granulocytes                         Date: 04/04/2023  Value: 0           Ref range: 0.0 - 5.0 %        Status: Final  Segs Absolute

## 2023-04-05 NOTE — CARE COORDINATION
with a Choice of Provider? (P) Patient   Name of the Patient Representative who was provided with the Choice of Provider and agrees with the Discharge Plan? The Patient and/or Patient Representative Agree with the Discharge Plan? (P) Yes   Freedom of Choice list was provided with basic dialogue that supports the individualized plan of care/goals, treatment preferences, and shares the quality data associated with the providers? (P) Yes     Milestones Met  Pt is for discharge home today with no needs/supportive care orders received for CM at this time.   Pt will have close follow up at the [address], RN 04/05/23 10:18 AM

## 2023-04-05 NOTE — PROGRESS NOTES
Patient discharged at 1012. All IVs removed. All discharge instructions, medications, and follow up appointments discussed with patient, patient verbalizes understanding. No complaints voiced by patient. Patient shows no s/sx of distress, active bleeding, or pain.
hgb within anesthesia guidelines, no follow-up required.
hours.    Imaging Last 24 Hours:  No results found. Assessment//Plan           Hospital Problems             Last Modified POA    * (Principal) Breast cancer in female [facility]) 4/4/2023 Yes    Malignant neoplasm of central portion of right breast in female, estrogen receptor positive ([facility] 4/4/2023 Yes    Estrogen receptor positive 4/4/2023 Yes    Overview Signed 3/21/2023 10:09 AM by Ghassan Negron     Added automatically from request for surgery [id number]          Assessment:    Condition: In stable condition. Plan:   Discharge home.        Electronically signed by Dolly Stewart MD on 4/5/23 at 7:28 AM EDT
Imaging/Diagnostics Last 24 Hours   No results found. Radiology:    CT Result (most recent):  No results found for this or any previous visit from the past 3650 days. PET Results (most recent):  No results found for this or any previous visit from the past 365 days. Mammo Results (most recent):  No results found for this or any previous visit from the past 365 days. US Result (most recent):  US RIGHT BREAST LIMITED 03/02/2023    Narrative  RIGHT BREAST SONOGRAPHY:    CLINICAL HISTORY: Persistent painful, palpable right breast lump for several  months and a 38year-old status post May 2021 bilateral mastectomies after  needle biopsy diagnosis of right breast high-grade DCIS at [facility]. The patient  reports subsequent removal of tissue expanders without reconstruction. COMPARISON:  Reports of multiple prior examinations from [facility] without images  for direct comparison, including right breast ultrasound of September 12, 2022,  PET/CT of June 9, 2022, and preoperative breast MRI of April 13, 2021. FINDINGS:  Images from careful ultrasound evaluation of the area of palpable  concern at the 12:00 position of the right mastectomy bed demonstrate a  multilobular hypoechoic structure with maximum diameter 4.2 cm associated with  posterior acoustical shadowing. It could be associated with a collapsed capsule  from the prior tissue expander, but images from the contralateral left breast  demonstrate no similar finding. It remains indeterminate for malignancy, and  follow-up evaluation with MRI is indicated. Impression  PALPABLE 4.2 CM MULTILOBULAR, SHADOWING HYPOECHOIC STRUCTURE AT THE 12:00  POSITION OF THE RIGHT MASTECTOMY BED IS ASYMMETRIC COMPARED WITH THE LEFT. BENIGN AND MALIGNANT ETIOLOGIES ARE POSSIBLE, AND FOLLOW-UP BREAST MRI IS  RECOMMENDED FOR FURTHER EVALUATION AT THIS TIME. BI-RADS Assessment Category 0: Incomplete: Needs additional imaging evaluation.   A reminder letter

## 2023-04-06 NOTE — PROGRESS NOTES
pov    Inc cdi  Abd soft  Pos gi/gu fxn    Path reviewd  Recommended increase pain meds to allow more ambulation    Fu next week or prn      Name:    Kvng Love Accession Number:   [id number]   MR #   [id number]   Date Obtained:   4/4/2023   DIAGNOSIS        \"UTERUS, TUBES AND OVARIES\":               CERVIX:                  CHRONIC INFLAMMATION AND SQUAMOUS METAPLASIA.               ENDOMETRIUM:                  INACTIVE ENDOMETRIUM. MYOMETRIUM:                  HISTOLOGICALLY UNREMARKABLE MYOMETRIUM. OVARIES AND FALLOPIAN TUBES:                  HISTOLOGICALLY UNREMARKABLE OVARIES AND FALLOPIAN TUBES.

## 2023-04-07 ENCOUNTER — OFFICE VISIT (OUTPATIENT)
Dept: ONCOLOGY | Age: 43
End: 2023-04-07

## 2023-04-07 VITALS
HEART RATE: 87 BPM | BODY MASS INDEX: 20.04 KG/M2 | WEIGHT: 140 LBS | SYSTOLIC BLOOD PRESSURE: 145 MMHG | OXYGEN SATURATION: 97 % | HEIGHT: 70 IN | DIASTOLIC BLOOD PRESSURE: 85 MMHG | TEMPERATURE: 98.3 F | RESPIRATION RATE: 16 BRPM

## 2023-04-07 DIAGNOSIS — C50.111 MALIGNANT NEOPLASM OF CENTRAL PORTION OF RIGHT BREAST IN FEMALE, ESTROGEN RECEPTOR POSITIVE (HCC): Primary | ICD-10-CM

## 2023-04-07 DIAGNOSIS — Z17.0 MALIGNANT NEOPLASM OF CENTRAL PORTION OF RIGHT BREAST IN FEMALE, ESTROGEN RECEPTOR POSITIVE (HCC): Primary | ICD-10-CM

## 2023-04-07 PROCEDURE — 99024 POSTOP FOLLOW-UP VISIT: CPT | Performed by: OBSTETRICS & GYNECOLOGY

## 2023-04-07 ASSESSMENT — PATIENT HEALTH QUESTIONNAIRE - PHQ9
SUM OF ALL RESPONSES TO PHQ9 QUESTIONS 1 & 2: 0
SUM OF ALL RESPONSES TO PHQ QUESTIONS 1-9: 0
2. FEELING DOWN, DEPRESSED OR HOPELESS: 0
SUM OF ALL RESPONSES TO PHQ QUESTIONS 1-9: 0
1. LITTLE INTEREST OR PLEASURE IN DOING THINGS: 0

## 2023-04-07 NOTE — PATIENT INSTRUCTIONS
Patient Instructions from Today's Visit    Reason for Visit:  Jerrod Luna op visit        Plan:    Important to move around. Recommend taking the Oxycodone so that you are able to move around more. Also take 800 mg Motrin 3 times a day for a week. You can also take Tylenol arthritis. You can take the stool softeners and laxatives to avoid constipation. Drink plenty of fluids. Follow Up:  - 1 week with NP (if you feel good, you can call and cancel)    Recent Lab Results: n/a      Treatment Summary has been discussed and given to patient: n/a        -------------------------------------------------------------------------------------------------------------------    Patient did express an interest in My Chart. My Chart log in information explained on the after visit summary printout at the [facility] [address] desk.     Ernesto Carney RN

## 2023-04-07 NOTE — LETTER
April 7, 2023      Queta Mckay MD  [address] [place] [facility] [address]      Patient: Yohannes Lorenzo   MR Number: [id number]   YOB: 1980   Date of Visit: 4/7/2023       Dear Queta Mcaky:    Thank you for referring Shazai Sanches to me for evaluation/treatment. Below are the relevant portions of my assessment and plan of care. If you have questions, please do not hesitate to call me. I look forward to following Hang Juarez along with you.     Sincerely,        Diamond Rousseau MD

## 2023-05-09 DIAGNOSIS — Z17.0 MALIGNANT NEOPLASM OF CENTRAL PORTION OF RIGHT BREAST IN FEMALE, ESTROGEN RECEPTOR POSITIVE (HCC): Primary | ICD-10-CM

## 2023-05-09 DIAGNOSIS — C50.111 MALIGNANT NEOPLASM OF CENTRAL PORTION OF RIGHT BREAST IN FEMALE, ESTROGEN RECEPTOR POSITIVE (HCC): Primary | ICD-10-CM

## 2023-05-19 ENCOUNTER — OFFICE VISIT (OUTPATIENT)
Dept: ONCOLOGY | Age: 43
End: 2023-05-19
Payer: COMMERCIAL

## 2023-05-19 ENCOUNTER — HOSPITAL ENCOUNTER (OUTPATIENT)
Dept: LAB | Age: 43
Discharge: HOME | End: 2023-05-19
Payer: COMMERCIAL

## 2023-05-19 VITALS
BODY MASS INDEX: 20.47 KG/M2 | RESPIRATION RATE: 16 BRPM | SYSTOLIC BLOOD PRESSURE: 118 MMHG | HEART RATE: 91 BPM | HEIGHT: 70 IN | TEMPERATURE: 98.3 F | OXYGEN SATURATION: 100 % | DIASTOLIC BLOOD PRESSURE: 87 MMHG | WEIGHT: 143 LBS

## 2023-05-19 DIAGNOSIS — Z17.0 MALIGNANT NEOPLASM OF CENTRAL PORTION OF RIGHT BREAST IN FEMALE, ESTROGEN RECEPTOR POSITIVE (HCC): ICD-10-CM

## 2023-05-19 DIAGNOSIS — T45.1X5A HOT FLASHES RELATED TO AROMATASE INHIBITOR THERAPY: ICD-10-CM

## 2023-05-19 DIAGNOSIS — R53.83 FATIGUE DUE TO TREATMENT: ICD-10-CM

## 2023-05-19 DIAGNOSIS — C50.111 MALIGNANT NEOPLASM OF CENTRAL PORTION OF RIGHT BREAST IN FEMALE, ESTROGEN RECEPTOR POSITIVE (HCC): Primary | ICD-10-CM

## 2023-05-19 DIAGNOSIS — R23.2 HOT FLASHES RELATED TO AROMATASE INHIBITOR THERAPY: ICD-10-CM

## 2023-05-19 DIAGNOSIS — C50.111 MALIGNANT NEOPLASM OF CENTRAL PORTION OF RIGHT BREAST IN FEMALE, ESTROGEN RECEPTOR POSITIVE (HCC): ICD-10-CM

## 2023-05-19 DIAGNOSIS — Z17.0 MALIGNANT NEOPLASM OF CENTRAL PORTION OF RIGHT BREAST IN FEMALE, ESTROGEN RECEPTOR POSITIVE (HCC): Primary | ICD-10-CM

## 2023-05-19 DIAGNOSIS — N89.8 VAGINAL DRYNESS: ICD-10-CM

## 2023-05-19 DIAGNOSIS — M25.50 ARTHRALGIA, UNSPECIFIED JOINT: ICD-10-CM

## 2023-05-19 LAB
ALBUMIN SERPL-MCNC: 4.2 G/DL (ref 3.5–5)
ALBUMIN/GLOB SERPL: 1.3 (ref 0.4–1.6)
ALP SERPL-CCNC: 106 U/L (ref 50–136)
ALT SERPL-CCNC: 20 U/L (ref 12–65)
ANION GAP SERPL CALC-SCNC: 7 MMOL/L (ref 2–11)
AST SERPL-CCNC: 13 U/L (ref 15–37)
BASOPHILS # BLD: 0 K/UL (ref 0–0.2)
BASOPHILS NFR BLD: 1 % (ref 0–2)
BILIRUB SERPL-MCNC: 0.5 MG/DL (ref 0.2–1.1)
BUN SERPL-MCNC: 12 MG/DL (ref 6–23)
CALCIUM SERPL-MCNC: 9.7 MG/DL (ref 8.3–10.4)
CHLORIDE SERPL-SCNC: 109 MMOL/L (ref 101–110)
CO2 SERPL-SCNC: 26 MMOL/L (ref 21–32)
CREAT SERPL-MCNC: 0.9 MG/DL (ref 0.6–1)
DIFFERENTIAL METHOD BLD: NORMAL
EOSINOPHIL # BLD: 0.1 K/UL (ref 0–0.8)
EOSINOPHIL NFR BLD: 3 % (ref 0.5–7.8)
ERYTHROCYTE [DISTWIDTH] IN BLOOD BY AUTOMATED COUNT: 12.1 % (ref 11.9–14.6)
ESTRADIOL SERPL-MCNC: 18.12 PG/ML
GLOBULIN SER CALC-MCNC: 3.3 G/DL (ref 2.8–4.5)
GLUCOSE SERPL-MCNC: 99 MG/DL (ref 65–100)
HCT VFR BLD AUTO: 44 % (ref 35.8–46.3)
HGB BLD-MCNC: 14.5 G/DL (ref 11.7–15.4)
IMM GRANULOCYTES # BLD AUTO: 0 K/UL (ref 0–0.5)
IMM GRANULOCYTES NFR BLD AUTO: 0 % (ref 0–5)
LYMPHOCYTES # BLD: 1.1 K/UL (ref 0.5–4.6)
LYMPHOCYTES NFR BLD: 25 % (ref 13–44)
MCH RBC QN AUTO: 28.9 PG (ref 26.1–32.9)
MCHC RBC AUTO-ENTMCNC: 33 G/DL (ref 31.4–35)
MCV RBC AUTO: 87.8 FL (ref 82–102)
MONOCYTES # BLD: 0.2 K/UL (ref 0.1–1.3)
MONOCYTES NFR BLD: 5 % (ref 4–12)
NEUTS SEG # BLD: 3.1 K/UL (ref 1.7–8.2)
NEUTS SEG NFR BLD: 66 % (ref 43–78)
NRBC # BLD: 0 K/UL (ref 0–0.2)
PLATELET # BLD AUTO: 222 K/UL (ref 150–450)
PMV BLD AUTO: 9.5 FL (ref 9.4–12.3)
POTASSIUM SERPL-SCNC: 4.3 MMOL/L (ref 3.5–5.1)
PROT SERPL-MCNC: 7.5 G/DL (ref 6.3–8.2)
RBC # BLD AUTO: 5.01 M/UL (ref 4.05–5.2)
SODIUM SERPL-SCNC: 142 MMOL/L (ref 133–143)
WBC # BLD AUTO: 4.6 K/UL (ref 4.3–11.1)

## 2023-05-19 PROCEDURE — 99214 OFFICE O/P EST MOD 30 MIN: CPT | Performed by: NURSE PRACTITIONER

## 2023-05-19 PROCEDURE — 36415 COLL VENOUS BLD VENIPUNCTURE: CPT

## 2023-05-19 PROCEDURE — 85025 COMPLETE CBC W/AUTO DIFF WBC: CPT

## 2023-05-19 PROCEDURE — 80053 COMPREHEN METABOLIC PANEL: CPT

## 2023-05-19 PROCEDURE — 82670 ASSAY OF TOTAL ESTRADIOL: CPT

## 2023-05-19 ASSESSMENT — PATIENT HEALTH QUESTIONNAIRE - PHQ9
1. LITTLE INTEREST OR PLEASURE IN DOING THINGS: 0
2. FEELING DOWN, DEPRESSED OR HOPELESS: 0
SUM OF ALL RESPONSES TO PHQ9 QUESTIONS 1 & 2: 0
SUM OF ALL RESPONSES TO PHQ QUESTIONS 1-9: 0

## 2023-05-19 NOTE — PROGRESS NOTES
[facility] Hematology and Oncology: Office Visit New Patient H & P    Chief Complaint:    Chief Complaint   Patient presents with    Follow-up         History of Present Illness:  Ms. Ping Peck is a 38 y.o. female who presents today for evaluation regarding breast cancer. In the spring of 2021, she developed a palpable abnormality and pain in her left breast. Bilateral digital diagnostic mammogram 3D/2D and targeted right ultrasound on 4/1/2021 identified an abnormal calcification in the central and mid upper portion of the right breast.  A hypoechoic lesion was seen on ultrasound corresponding to the dominant collection of calcifications seen on mammogram at the 12 o'clock position areolar margin. Since the calcifications appeared to extend over a larger area than the ultrasound finding, stereotactic biopsy was recommended for further evaluation. This was performed on 4/6/21 with pathology revealing DCIS, ER 93%/CA 17% positive. Bilateral MRI of breasts with CAD was obtained on 4/13/2021 demonstrating extensive suspicious discontiguous clustered ring enhancement involving the right UOQ extending to the midline and just below the nipple laterally mimicking the distribution of the calcifications noted mammographically and consistent with the known DCIS; 1.4 cm separate area of clustered ring enhancement in the right axillary tail, suspicious for additional disease; and left breast with no significant enhancing lesions. She was evaluated in consultation at [facility] breast [facility] on 4/15/22. Genetic counseling and testing with 84 gene panel sent on 4/15/2021 was negative for pathogenic mutation. She ultimately decided to proceed with bilateral mastectomy with immediate reconstruction and right sentinel node biopsy which was performed on 5/10/21. Pathology from the left breast showed atypical ductal hyperplasia.  Pathology from the right breast showed ER 93%/PR13%/HER-2 positive by FISH, poorly differentiated multifocal

## 2023-05-19 NOTE — PATIENT INSTRUCTIONS
Patient Instructions from Today's Visit    Reason for Visit:  Follow up-Breast    Diagnosis Information:  [email or web address]      Plan:  Continue the Arimidex. With your ovaries out you are considered post menopausal.  You can try tart cherry concentrate to help with your joint aches and pains.   We will refer you to oncology rehab   Follow Up:  As scheduled     Recent Lab Results:  Hospital Outpatient Visit on 05/19/2023   Component Date Value Ref Range Status    WBC 05/19/2023 4.6  4.3 - 11.1 K/uL Final    RBC 05/19/2023 5.01  4.05 - 5.2 M/uL Final    Hemoglobin 05/19/2023 14.5  11.7 - 15.4 g/dL Final    Hematocrit 05/19/2023 44.0  35.8 - 46.3 % Final    MCV 05/19/2023 87.8  82.0 - 102.0 FL Final    MCH 05/19/2023 28.9  26.1 - 32.9 PG Final    MCHC 05/19/2023 33.0  31.4 - 35.0 g/dL Final    RDW 05/19/2023 12.1  11.9 - 14.6 % Final    Platelets 39/98/2887 222  150 - 450 K/uL Final    MPV 05/19/2023 9.5  9.4 - 12.3 FL Final    nRBC 05/19/2023 0.00  0.0 - 0.2 K/uL Final    **Note: Absolute NRBC parameter is now reported with Hemogram**    Neutrophils % 05/19/2023 66  43 - 78 % Final    Lymphocytes % 05/19/2023 25  13 - 44 % Final    Monocytes % 05/19/2023 5  4.0 - 12.0 % Final    Eosinophils % 05/19/2023 3  0.5 - 7.8 % Final    Basophils % 05/19/2023 1  0.0 - 2.0 % Final    Immature Granulocytes 05/19/2023 0  0.0 - 5.0 % Final    Neutrophils Absolute 05/19/2023 3.1  1.7 - 8.2 K/UL Final    Lymphocytes Absolute 05/19/2023 1.1  0.5 - 4.6 K/UL Final    Monocytes Absolute 05/19/2023 0.2  0.1 - 1.3 K/UL Final    Eosinophils Absolute 05/19/2023 0.1  0.0 - 0.8 K/UL Final    Basophils Absolute 05/19/2023 0.0  0.0 - 0.2 K/UL Final    Absolute Immature Granulocyte 05/19/2023 0.0  0.0 - 0.5 K/UL Final    Differential Type 05/19/2023 AUTOMATED    Final    Sodium 05/19/2023 142  133 - 143 mmol/L Final    Potassium 05/19/2023 4.3  3.5 - 5.1 mmol/L

## 2023-05-25 DIAGNOSIS — R10.11 RIGHT UPPER QUADRANT ABDOMINAL PAIN: Primary | ICD-10-CM

## 2023-05-25 DIAGNOSIS — C50.111 MALIGNANT NEOPLASM OF CENTRAL PORTION OF RIGHT BREAST IN FEMALE, ESTROGEN RECEPTOR POSITIVE (HCC): ICD-10-CM

## 2023-05-25 DIAGNOSIS — Z17.0 MALIGNANT NEOPLASM OF CENTRAL PORTION OF RIGHT BREAST IN FEMALE, ESTROGEN RECEPTOR POSITIVE (HCC): ICD-10-CM

## 2023-06-07 ENCOUNTER — HOSPITAL ENCOUNTER (OUTPATIENT)
Dept: PHYSICAL THERAPY | Age: 43
Setting detail: RECURRING SERIES
End: 2023-06-07
Payer: COMMERCIAL

## 2023-06-15 ENCOUNTER — PREP FOR PROCEDURE (OUTPATIENT)
Dept: GASTROENTEROLOGY | Age: 43
End: 2023-06-15

## 2023-06-15 PROBLEM — R13.19 ESOPHAGEAL DYSPHAGIA: Status: ACTIVE | Noted: 2023-06-15

## 2023-06-15 PROBLEM — R10.11 RIGHT UPPER QUADRANT PAIN: Status: ACTIVE | Noted: 2023-06-15

## 2023-06-15 PROBLEM — K21.9 GERD (GASTROESOPHAGEAL REFLUX DISEASE): Status: ACTIVE | Noted: 2023-06-15

## 2023-06-15 PROBLEM — R19.5 LOOSE STOOLS: Status: ACTIVE | Noted: 2023-06-15

## 2023-06-16 RX ORDER — SODIUM CHLORIDE 9 MG/ML
25 INJECTION, SOLUTION INTRAVENOUS PRN
Status: CANCELLED | OUTPATIENT
Start: 2023-06-16

## 2023-06-16 RX ORDER — SODIUM CHLORIDE 0.9 % (FLUSH) 0.9 %
5-40 SYRINGE (ML) INJECTION EVERY 12 HOURS SCHEDULED
Status: CANCELLED | OUTPATIENT
Start: 2023-06-16

## 2023-06-16 RX ORDER — SODIUM CHLORIDE 0.9 % (FLUSH) 0.9 %
5-40 SYRINGE (ML) INJECTION PRN
Status: CANCELLED | OUTPATIENT
Start: 2023-06-16

## 2023-06-19 ENCOUNTER — TELEPHONE (OUTPATIENT)
Dept: GASTROENTEROLOGY | Age: 43
End: 2023-06-19

## 2023-06-19 ENCOUNTER — HOSPITAL ENCOUNTER (OUTPATIENT)
Dept: PHYSICAL THERAPY | Age: 43
Setting detail: RECURRING SERIES
Discharge: HOME | End: 2023-06-22
Payer: COMMERCIAL

## 2023-06-19 PROCEDURE — 97110 THERAPEUTIC EXERCISES: CPT

## 2023-06-19 PROCEDURE — 97161 PT EVAL LOW COMPLEX 20 MIN: CPT

## 2023-06-19 PROCEDURE — 97140 MANUAL THERAPY 1/> REGIONS: CPT

## 2023-06-19 ASSESSMENT — PAIN SCALES - GENERAL: PAINLEVEL_OUTOF10: 0

## 2023-06-19 NOTE — TELEPHONE ENCOUNTER
I called pt to let her know her insurance denied her medication esomeprazole mag cap 20 mg, her insurance is requiring her to try omeprazole dr ,Lansoprazole , or rabeprazole ec first and requires doctors note of trial and fail to be approved, Pt did not answer and I LVM asking pt to return call to office

## 2023-06-19 NOTE — PROGRESS NOTES
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)  Medical/Referring Diagnosis:  Malignant neoplasm of central portion of right female breast [C50.111]  Estrogen receptor positive status (ER+) [Z17.0]  Referring Physician:  EVELYN Rodas MD Orders:  PT Eval and Treat oncology rehab  Date of Onset:  Onset Date: 05/10/21     Allergies:   Clindamycin, Lidocaine, Lidocaine hcl, Penicillins, Prohance [gadoteridol], Iodinated contrast media, Iodine, Shellfish allergy, Adhesive tape, and Clindamycin/lincomycin  Restrictions/Precautions:  Restrictions/Precautions: None  No data recorded     Interventions Planned (Treatment may consist of any combination of the following):    Current Treatment Recommendations: Strengthening; ROM; Manual; Manual lymphatic drainage; Home exercise program; Patient/Caregiver education & training     >Subjective Comments: The patient reports she is having pain and tenderness in her right chest with household activities  >Initial:     0/10>Post Session:       3/10  Medications Last Reviewed:  2023  Updated Objective Findings:  See evaluation note from today  Treatment   THERAPEUTIC EXERCISE: (10 minutes):    Exercises per grid below to improve mobility. Required minimal verbal cues to promote proper body alignment. Progressed range as indicated.   MANUAL THERAPY: (15 minutes):

## 2023-06-19 NOTE — THERAPY EVALUATION
Trinity Joya  : 1980  Primary: Kristyn Riley Sc ([facility])  Secondary:  [facility]  [address] Gail [address]  [facility] [address]  Phone: [phone]  Fax: [phone] Plan Frequency: 1-2x/week for 90 days    Plan of Care/Certification Expiration Date: 23      PT Visit Info:  Plan Frequency: 1-2x/week for 90 days  Plan of Care/Certification Expiration Date: 23      Visit Count:  1                OUTPATIENT PHYSICAL THERAPY:             OP NOTE TYPE: Initial Assessment 2023               Episode (oncology rehab) Appt Desk         Treatment Diagnosis:  Stiffness of Right Shoulder, Not elsewhere classified (M25.611)  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)  Medical/Referring Diagnosis:  Malignant neoplasm of central portion of right female breast [C50.111]  Estrogen receptor positive status (ER+) [Z17.0]  Referring Physician:  EVELYN Galloway MD Orders:  PT Eval and Treat oncology rehab  Return MD Appt:  23  Date of Onset:  Onset Date: 05/10/21      Allergies:  Clindamycin, Lidocaine, Lidocaine hcl, Penicillins, Prohance [gadoteridol], Iodinated contrast media, Iodine, Shellfish allergy, Adhesive tape, and Clindamycin/lincomycin  Restrictions/Precautions:    Restrictions/Precautions: None        Medications Last Reviewed:  2023     SUBJECTIVE   History of Injury/Illness (Reason for Referral): The patient underwent a bilateral mastectomy with expander placement and SNB 5/10/21. She had an AND 23 ( 11 nodes). She had the expanders removed. She completed TCH x 6 and H to complete a year. Shew as placed on Arimidex. She has developed limited right shoulder ROM along with scarring and tenderness of the right anterior chest.  She is at risk for lymphedema. She has a poorly fitting compression garment.   Patient Stated Goal(s):  \"reduce pain and improve mobility\"  Initial:     0/10 Post Session:     3/10  Past Medical History/Comorbidities:

## 2023-06-23 NOTE — PERIOP NOTE
Patient verified name, , and procedure. Type: 1a; abbreviated assessment per anesthesia guidelines    Labs per anesthesia: none    Instructed pt that they will be notified the day before their procedure by the GI Lab for time of arrival if their procedure is [facility] and Pre-op for [place] cases. Arrival times should be called by 5 pm. If no phone is received the patient should contact the GI lab at [phone]. Follow diet and prep instructions per office including NPO status. If patient has NOT received instructions from office patient is advised to call surgeon office, verbalizes understanding. Bath or shower the night before and the am of surgery with non-moisturizing soap. No lotions, oils, powders, cologne on skin. No make up, eye make up or jewelry. Wear loose fitting comfortable, clean clothing. Must have adult present in building the entire time . TAKE ONLY THE FOLLOWING MEDICATION ON THE DAY OF THE PROCEDURE : Arimidex, nexium    MEDICATIONS TO HOLD : none      The following discharge instructions reviewed with patient: medication given during procedure may cause drowsiness for several hours, therefore, do not drive or operate machinery for remainder of the day. You may not drink alcohol on the day of your procedure, please resume regular diet and activity unless otherwise directed. You may experience abdominal distention for several hours that is relieved by the passage of gas. Contact your physician if you have any of the following: fever or chills, severe abdominal pain or excessive amount of bleeding or a large amount when having a bowel movement. Occasional specks of blood with bowel movement would not be unusual.     You may be required to pay a deductible or co-pay on the day of your procedure. You can pre-pay by calling  [phone] if your surgery is at the [facility].

## 2023-06-26 ENCOUNTER — TELEPHONE (OUTPATIENT)
Dept: ONCOLOGY | Age: 43
End: 2023-06-26

## 2023-06-26 ENCOUNTER — HOSPITAL ENCOUNTER (OUTPATIENT)
Dept: PHYSICAL THERAPY | Age: 43
Setting detail: RECURRING SERIES
Discharge: HOME | End: 2023-06-29
Payer: COMMERCIAL

## 2023-06-26 PROCEDURE — 97110 THERAPEUTIC EXERCISES: CPT

## 2023-06-26 PROCEDURE — 97140 MANUAL THERAPY 1/> REGIONS: CPT

## 2023-06-26 ASSESSMENT — PAIN SCALES - GENERAL: PAINLEVEL_OUTOF10: 3

## 2023-06-26 NOTE — TELEPHONE ENCOUNTER
Pt reporting difficulty ambulating - first noticed in May. States that when stepping forward with her right foot, she seems to be miss judging her steps which is causing her to trip up. States it feels as though her foot is stuck to the floor. 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 As of now, pt states she only has some discomfort/aching in her foot. Denied numbness/sensation of pins and needles. Advised pt to contact PCP. Msg routed to Nps for any additional input.

## 2023-06-26 NOTE — TELEPHONE ENCOUNTER
Pt called asking for call back to address an issue she is having. Pt states \"her right foot was tripping her up x2 months and becoming more frequent causing her to nearly fall because it is like it gets stuck on the ground like she is dragging it. \"

## 2023-07-03 ENCOUNTER — HOSPITAL ENCOUNTER (OUTPATIENT)
Dept: PHYSICAL THERAPY | Age: 43
Setting detail: RECURRING SERIES
Discharge: HOME | End: 2023-07-06
Payer: COMMERCIAL

## 2023-07-03 PROCEDURE — 97140 MANUAL THERAPY 1/> REGIONS: CPT

## 2023-07-03 PROCEDURE — 97110 THERAPEUTIC EXERCISES: CPT

## 2023-07-03 ASSESSMENT — PAIN SCALES - GENERAL: PAINLEVEL_OUTOF10: 4

## 2023-07-03 NOTE — PROGRESS NOTES
Ricardo Sutton  : 1980  Primary: Cheyenne Flaherty Sc ([facility])  Secondary:  O MILLENNIUM  [address] Pipo Thomas [place]elyn Veliz [address] [address]  Phone: [phone]  Fax: [phone] Plan Frequency: 1-2x/week for 90 days  Plan of Care/Certification Expiration Date: 23      >PT Visit Info:  Plan Frequency: 1-2x/week for 90 days  Plan of Care/Certification Expiration Date: 23      Visit Count:  3    OUTPATIENT PHYSICAL THERAPY:OP NOTE TYPE: Treatment Note 7/3/2023       Episode  }Appt Desk             Treatment Diagnosis:  Stiffness of Right Shoulder, Not elsewhere classified (M25.611)  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)  Medical/Referring Diagnosis:  Malignant neoplasm of central portion of right female breast [C50.111]  Estrogen receptor positive status (ER+) [Z17.0]  Referring Physician:  EVELYN Summers MD Orders:  PT Eval and Treat oncology rehab  Date of Onset:  Onset Date: 05/10/21     Allergies:   Clindamycin, Lidocaine, Lidocaine hcl, Penicillins, Prohance [gadoteridol], Iodinated contrast media, Iodine, Shellfish allergy, Adhesive tape, and Clindamycin/lincomycin  Restrictions/Precautions:  Restrictions/Precautions: None  No data recorded     Interventions Planned (Treatment may consist of any combination of the following):    Current Treatment Recommendations: Strengthening; ROM; Manual; Manual lymphatic drainage; Home exercise program; Patient/Caregiver education & training     >Subjective Comments: The patient reports she is having a colonoscopy Thursday and isn't feeling very well.   >Initial:     4/10>Post Session:       3/10  Medications Last Reviewed:  7/3/2023  Updated Objective Findings:   as below  DATE 23       RIGHT LEFT RIGHT LEFT   MCP 19.5      WRIST 15.7      10 cm 17.1      20 cm 23.1      30 cm 22.5      40 cm 25.4      50 cm 28.2             Flexion 172, abduction 170, external rotation 90    Treatment   THERAPEUTIC EXERCISE: (15 minutes):

## 2023-07-17 ENCOUNTER — HOSPITAL ENCOUNTER (OUTPATIENT)
Dept: PHYSICAL THERAPY | Age: 43
Setting detail: RECURRING SERIES
Discharge: HOME | End: 2023-07-20
Payer: COMMERCIAL

## 2023-07-17 PROCEDURE — 97110 THERAPEUTIC EXERCISES: CPT

## 2023-07-17 PROCEDURE — 97140 MANUAL THERAPY 1/> REGIONS: CPT

## 2023-07-17 ASSESSMENT — PAIN SCALES - GENERAL: PAINLEVEL_OUTOF10: 3

## 2023-07-17 NOTE — PROGRESS NOTES
Tati Marie  : 1980  Primary: Yocasta Brambila Sc ([facility])  Secondary:  [facility]  [address] Faby Cazares [address] [place] [address]  Phone: [phone]  Fax: [phone] Plan Frequency: 1-2x/week for 90 days  Plan of Care/Certification Expiration Date: 23      >PT Visit Info:  Plan Frequency: 1-2x/week for 90 days  Plan of Care/Certification Expiration Date: 23      Visit Count:  4    OUTPATIENT PHYSICAL THERAPY:OP NOTE TYPE: Treatment Note 2023       Episode  }Appt Desk             Treatment Diagnosis:  Stiffness of Right Shoulder, Not elsewhere classified (M25.611)  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)  Medical/Referring Diagnosis:  Malignant neoplasm of central portion of right female breast [C50.111]  Estrogen receptor positive status (ER+) [Z17.0]  Referring Physician:  EVELYN Patel MD Orders:  PT Eval and Treat oncology rehab  Date of Onset:  Onset Date: 05/10/21     Allergies:   Clindamycin, Lidocaine, Lidocaine hcl, Penicillins, Prohance [gadoteridol], Iodinated contrast media, Iodine, Shellfish allergy, Adhesive tape, and Clindamycin/lincomycin  Restrictions/Precautions:  Restrictions/Precautions: None  No data recorded     Interventions Planned (Treatment may consist of any combination of the following):    Current Treatment Recommendations: Strengthening; ROM; Manual; Manual lymphatic drainage; Home exercise program; Patient/Caregiver education & training     >Subjective Comments:   The patient reports she is not sleeping at night and feels bad.  >Initial:     3/10>Post Session:       2/10  Medications Last Reviewed:  2023  Updated Objective Findings:   as below  DATE 6/26/23 7/15/23      RIGHT RIGHT RIGHT LEFT   MCP 19.5 19.2     WRIST 15.7 16     10 cm 17.1 17.2     20 cm 23.1 23.2     30 cm 22.5 22.8     40 cm 25.4 25.6     50 cm 28.2 29            Flexion 172, abduction 180, external rotation 90    Treatment   THERAPEUTIC EXERCISE: (10 minutes):

## 2023-07-24 ENCOUNTER — TELEPHONE (OUTPATIENT)
Dept: ONCOLOGY | Age: 43
End: 2023-07-24

## 2023-07-24 RX ORDER — ANASTROZOLE 1 MG/1
1 TABLET ORAL DAILY
Qty: 30 TABLET | Refills: 3 | Status: SHIPPED | OUTPATIENT
Start: 2023-07-24

## 2023-07-24 NOTE — TELEPHONE ENCOUNTER
Physician provider: Juan Zarate MD  Reason for today's call: medication refill  Last office visit:5/19/2023    Patient notified that their information will be routed to the [facility] clinical triage team for review. Patient is advised that they will receive a phone call from the triage department.      Medication refill request: anastrozole 1 Mg      Pharmacy: [address] [place] [address] To [facility]

## 2023-07-24 NOTE — TELEPHONE ENCOUNTER
Medication Requested: Arimidex    Is this medication a narcotic: no    Is the patient's pain controlled? N/A    Date of Last OV: 5/19/23    Date of Next OV: 8/2/23    Date last prescribed: 3/28/23    Last Rx fill in per [facility] site: N/A    Out Early: NO    Took Extra: NO    Not out early but not enough to make it until next appointment: Yes    Requested Pharmacy: [facility]    Action Taken: Refills sent to pharmacy as requested.

## 2023-08-01 DIAGNOSIS — Z17.0 ESTROGEN RECEPTOR POSITIVE: ICD-10-CM

## 2023-08-01 DIAGNOSIS — Z17.0 MALIGNANT NEOPLASM OF CENTRAL PORTION OF RIGHT BREAST IN FEMALE, ESTROGEN RECEPTOR POSITIVE (HCC): Primary | ICD-10-CM

## 2023-08-01 DIAGNOSIS — C50.111 MALIGNANT NEOPLASM OF CENTRAL PORTION OF RIGHT BREAST IN FEMALE, ESTROGEN RECEPTOR POSITIVE (HCC): Primary | ICD-10-CM

## 2023-08-02 ENCOUNTER — OFFICE VISIT (OUTPATIENT)
Dept: ONCOLOGY | Age: 43
End: 2023-08-02
Payer: COMMERCIAL

## 2023-08-02 ENCOUNTER — HOSPITAL ENCOUNTER (OUTPATIENT)
Dept: LAB | Age: 43
Discharge: HOME | End: 2023-08-05
Payer: COMMERCIAL

## 2023-08-02 VITALS
BODY MASS INDEX: 20.62 KG/M2 | HEART RATE: 83 BPM | DIASTOLIC BLOOD PRESSURE: 74 MMHG | TEMPERATURE: 98.8 F | SYSTOLIC BLOOD PRESSURE: 108 MMHG | WEIGHT: 144 LBS | OXYGEN SATURATION: 99 % | RESPIRATION RATE: 16 BRPM | HEIGHT: 70 IN

## 2023-08-02 DIAGNOSIS — C50.111 MALIGNANT NEOPLASM OF CENTRAL PORTION OF RIGHT BREAST IN FEMALE, ESTROGEN RECEPTOR POSITIVE (HCC): Primary | ICD-10-CM

## 2023-08-02 DIAGNOSIS — Z17.0 ESTROGEN RECEPTOR POSITIVE: ICD-10-CM

## 2023-08-02 DIAGNOSIS — C50.111 MALIGNANT NEOPLASM OF CENTRAL PORTION OF RIGHT BREAST IN FEMALE, ESTROGEN RECEPTOR POSITIVE (HCC): ICD-10-CM

## 2023-08-02 DIAGNOSIS — Z17.0 MALIGNANT NEOPLASM OF CENTRAL PORTION OF RIGHT BREAST IN FEMALE, ESTROGEN RECEPTOR POSITIVE (HCC): Primary | ICD-10-CM

## 2023-08-02 DIAGNOSIS — Z17.0 MALIGNANT NEOPLASM OF CENTRAL PORTION OF RIGHT BREAST IN FEMALE, ESTROGEN RECEPTOR POSITIVE (HCC): ICD-10-CM

## 2023-08-02 LAB
ALBUMIN SERPL-MCNC: 4.1 G/DL (ref 3.5–5)
ALBUMIN/GLOB SERPL: 1.2 (ref 0.4–1.6)
ALP SERPL-CCNC: 98 U/L (ref 50–136)
ALT SERPL-CCNC: 24 U/L (ref 12–65)
ANION GAP SERPL CALC-SCNC: 4 MMOL/L (ref 2–11)
AST SERPL-CCNC: 14 U/L (ref 15–37)
BASOPHILS # BLD: 0 K/UL (ref 0–0.2)
BASOPHILS NFR BLD: 0 % (ref 0–2)
BILIRUB SERPL-MCNC: 0.5 MG/DL (ref 0.2–1.1)
BUN SERPL-MCNC: 13 MG/DL (ref 6–23)
CALCIUM SERPL-MCNC: 9.6 MG/DL (ref 8.3–10.4)
CHLORIDE SERPL-SCNC: 108 MMOL/L (ref 101–110)
CO2 SERPL-SCNC: 26 MMOL/L (ref 21–32)
CREAT SERPL-MCNC: 0.9 MG/DL (ref 0.6–1)
DIFFERENTIAL METHOD BLD: NORMAL
EOSINOPHIL # BLD: 0.1 K/UL (ref 0–0.8)
EOSINOPHIL NFR BLD: 2 % (ref 0.5–7.8)
ERYTHROCYTE [DISTWIDTH] IN BLOOD BY AUTOMATED COUNT: 12 % (ref 11.9–14.6)
ESTRADIOL SERPL-MCNC: <11.8 PG/ML
GLOBULIN SER CALC-MCNC: 3.4 G/DL (ref 2.8–4.5)
GLUCOSE SERPL-MCNC: 112 MG/DL (ref 65–100)
HCT VFR BLD AUTO: 43.9 % (ref 35.8–46.3)
HGB BLD-MCNC: 14.5 G/DL (ref 11.7–15.4)
IMM GRANULOCYTES # BLD AUTO: 0 K/UL (ref 0–0.5)
IMM GRANULOCYTES NFR BLD AUTO: 0 % (ref 0–5)
LYMPHOCYTES # BLD: 1.4 K/UL (ref 0.5–4.6)
LYMPHOCYTES NFR BLD: 20 % (ref 13–44)
MCH RBC QN AUTO: 28.9 PG (ref 26.1–32.9)
MCHC RBC AUTO-ENTMCNC: 33 G/DL (ref 31.4–35)
MCV RBC AUTO: 87.5 FL (ref 82–102)
MONOCYTES # BLD: 0.3 K/UL (ref 0.1–1.3)
MONOCYTES NFR BLD: 4 % (ref 4–12)
NEUTS SEG # BLD: 5 K/UL (ref 1.7–8.2)
NEUTS SEG NFR BLD: 73 % (ref 43–78)
NRBC # BLD: 0 K/UL (ref 0–0.2)
PLATELET # BLD AUTO: 225 K/UL (ref 150–450)
PMV BLD AUTO: 9.8 FL (ref 9.4–12.3)
POTASSIUM SERPL-SCNC: 3.8 MMOL/L (ref 3.5–5.1)
PROT SERPL-MCNC: 7.5 G/DL (ref 6.3–8.2)
RBC # BLD AUTO: 5.02 M/UL (ref 4.05–5.2)
SODIUM SERPL-SCNC: 138 MMOL/L (ref 133–143)
WBC # BLD AUTO: 6.8 K/UL (ref 4.3–11.1)

## 2023-08-02 PROCEDURE — 85025 COMPLETE CBC W/AUTO DIFF WBC: CPT

## 2023-08-02 PROCEDURE — 36415 COLL VENOUS BLD VENIPUNCTURE: CPT

## 2023-08-02 PROCEDURE — 80053 COMPREHEN METABOLIC PANEL: CPT

## 2023-08-02 PROCEDURE — 82670 ASSAY OF TOTAL ESTRADIOL: CPT

## 2023-08-02 PROCEDURE — 99214 OFFICE O/P EST MOD 30 MIN: CPT | Performed by: INTERNAL MEDICINE

## 2023-08-02 ASSESSMENT — PATIENT HEALTH QUESTIONNAIRE - PHQ9
1. LITTLE INTEREST OR PLEASURE IN DOING THINGS: 0
2. FEELING DOWN, DEPRESSED OR HOPELESS: 0
SUM OF ALL RESPONSES TO PHQ QUESTIONS 1-9: 0
SUM OF ALL RESPONSES TO PHQ9 QUESTIONS 1 & 2: 0
SUM OF ALL RESPONSES TO PHQ QUESTIONS 1-9: 0

## 2023-08-02 NOTE — PROGRESS NOTES
[address] [place] [address], [address] [place] [address]  Office : [phone]  Fax : [phone]

## 2023-08-02 NOTE — PATIENT INSTRUCTIONS
Patient Instructions from Today's Visit    Reason for Visit:  Follow up Breast Cancer     Diagnosis Information:  [email or web address]    Plan:  Lab results     Follow Up: Follow up in 6 mo    Recent Lab Results:  N/A    Treatment Summary has been discussed and given to patient:   N/A    -------------------------------------------------------------------------------------------------------------------  Please call our office at [phone] if you have any  of the following symptoms:   Fever of 100.5 or greater  Chills  Shortness of breath  Swelling or pain in one leg    After office hours an answering service is available and will contact a provider for emergencies or if you are experiencing any of the above symptoms. Patient does express an interest in My Chart. My Chart log in information explained on the after visit summary printout at the [facility] desk.     Kim Jimenez RN

## 2023-08-04 NOTE — PERIOP NOTE
Patient verified name, , and procedure. Type: 1a; abbreviated assessment per anesthesia guidelines    Labs per anesthesia: None    Instructed pt that they will be notified the day before their procedure by the GI Lab for time of arrival if their procedure is [facility] and Pre-op for [place] cases. Arrival times should be called by 5 pm. If no phone is received the patient should contact their respective hospital. The GI lab telephone number is [phone]. Follow diet and prep instructions per office including NPO status. If patient has NOT received instructions from office patient is advised to call surgeon office, verbalizes understanding. Bath or shower the night before and the am of surgery with non-moisturizing soap. No lotions, oils, powders, cologne on skin. No make up, eye make up or jewelry. Wear loose fitting comfortable, clean clothing. Must have adult present in building the entire time . Medications for the day of procedure: arimedex, esomeprazole (Nexium); patient to hold: NONE    The following discharge instructions reviewed with patient: medication given during procedure may cause drowsiness for several hours, therefore, do not drive or operate machinery for remainder of the day. You may not drink alcohol on the day of your procedure, please resume regular diet and activity unless otherwise directed. You may experience abdominal distention for several hours that is relieved by the passage of gas. Contact your physician if you have any of the following: fever or chills, severe abdominal pain or excessive amount of bleeding or a large amount when having a bowel movement.  Occasional specks of blood with bowel movement would not be unusual.

## 2023-08-04 NOTE — PERIOP NOTE
Dear Mrs. Jones Guardian you for completing your phone assessment with me today. Here are your requested procedure instructions. Please call #[phone] with any questions/concerns. Your procedure is scheduled at [address] [place] [address]. Please arrive at MAIN Entrance. GI Lab (#[phone]) will call you on the business day before your surgery with your arrival time. If you have any questions on the day of procedure, please call the GI dept. at the telephone number above. Follow procedure instructions for EGD or colonoscopy prep received from the GI/Surgeon office. If you have not received instructions from GI office, please call their office to receive prep instructions for procedure. Please take these medications on the morning of the procedure with a small sip of water:  arimedex, esomeprazole (Nexium). Please stop all vitamins/supplements 7 days prior to the procedure and stop all NSAIDS (ibuprofen, naproxen, aleve, motrin, advil) 5 days before your procedure. A responsible adult must drive you to the hospital, remain in the building during the procedure and you will need adult supervision for 24 hours after anesthesia. Please use a non-moisturizing soap the night before the procedure and on the morning of the procedure. Do NOT wear: make-up, nail polish, lotions, cologne, perfumes, powders or oil on your skin. All piercings/metal/jewelry must be removed prior to arrival.  If you wear contacts then you will need to bring a case to store them in or wear your glasses. Deodorant is acceptable with all EGDs and/or colonoscopies. Medication given during procedure may cause drowsiness for several hours, therefore, do not drive or operate machinery for remainder of the day. You may not drink alcohol on the day of your procedure, please resume regular diet and activity unless otherwise directed. For colonoscopy:  You may

## 2023-08-06 ENCOUNTER — ANESTHESIA EVENT (OUTPATIENT)
Dept: ENDOSCOPY | Age: 43
End: 2023-08-06
Payer: COMMERCIAL

## 2023-08-06 RX ORDER — ONDANSETRON 2 MG/ML
4 INJECTION INTRAMUSCULAR; INTRAVENOUS
Status: CANCELLED | OUTPATIENT
Start: 2023-08-06 | End: 2023-08-07

## 2023-08-06 RX ORDER — SODIUM CHLORIDE 0.9 % (FLUSH) 0.9 %
5-40 SYRINGE (ML) INJECTION EVERY 12 HOURS SCHEDULED
Status: CANCELLED | OUTPATIENT
Start: 2023-08-06

## 2023-08-06 RX ORDER — SODIUM CHLORIDE 9 MG/ML
INJECTION, SOLUTION INTRAVENOUS PRN
Status: CANCELLED | OUTPATIENT
Start: 2023-08-06

## 2023-08-06 RX ORDER — SODIUM CHLORIDE 0.9 % (FLUSH) 0.9 %
5-40 SYRINGE (ML) INJECTION PRN
Status: CANCELLED | OUTPATIENT
Start: 2023-08-06

## 2023-08-07 ENCOUNTER — HOSPITAL ENCOUNTER (OUTPATIENT)
Age: 43
Setting detail: OUTPATIENT SURGERY
Discharge: HOME | End: 2023-08-07
Attending: INTERNAL MEDICINE | Admitting: INTERNAL MEDICINE
Payer: COMMERCIAL

## 2023-08-07 ENCOUNTER — ANESTHESIA (OUTPATIENT)
Dept: ENDOSCOPY | Age: 43
End: 2023-08-07
Payer: COMMERCIAL

## 2023-08-07 VITALS
OXYGEN SATURATION: 99 % | HEART RATE: 84 BPM | WEIGHT: 144 LBS | BODY MASS INDEX: 20.62 KG/M2 | TEMPERATURE: 97.7 F | DIASTOLIC BLOOD PRESSURE: 82 MMHG | SYSTOLIC BLOOD PRESSURE: 134 MMHG | HEIGHT: 70 IN | RESPIRATION RATE: 18 BRPM

## 2023-08-07 PROCEDURE — C1726 CATH, BAL DIL, NON-VASCULAR: HCPCS | Performed by: INTERNAL MEDICINE

## 2023-08-07 PROCEDURE — 88305 TISSUE EXAM BY PATHOLOGIST: CPT

## 2023-08-07 PROCEDURE — 88312 SPECIAL STAINS GROUP 1: CPT

## 2023-08-07 PROCEDURE — 2709999900 HC NON-CHARGEABLE SUPPLY: Performed by: INTERNAL MEDICINE

## 2023-08-07 PROCEDURE — 3700000000 HC ANESTHESIA ATTENDED CARE: Performed by: INTERNAL MEDICINE

## 2023-08-07 PROCEDURE — 3700000001 HC ADD 15 MINUTES (ANESTHESIA): Performed by: INTERNAL MEDICINE

## 2023-08-07 PROCEDURE — 3609009300 HC COLONOSCOPY BIOPSY/STOMA: Performed by: INTERNAL MEDICINE

## 2023-08-07 PROCEDURE — 3609012400 HC EGD TRANSORAL BIOPSY SINGLE/MULTIPLE: Performed by: INTERNAL MEDICINE

## 2023-08-07 PROCEDURE — 2580000003 HC RX 258: Performed by: ANESTHESIOLOGY

## 2023-08-07 PROCEDURE — 7100000010 HC PHASE II RECOVERY - FIRST 15 MIN: Performed by: INTERNAL MEDICINE

## 2023-08-07 PROCEDURE — 7100000011 HC PHASE II RECOVERY - ADDTL 15 MIN: Performed by: INTERNAL MEDICINE

## 2023-08-07 PROCEDURE — 6360000002 HC RX W HCPCS: Performed by: NURSE ANESTHETIST, CERTIFIED REGISTERED

## 2023-08-07 RX ORDER — SODIUM CHLORIDE 0.9 % (FLUSH) 0.9 %
5-40 SYRINGE (ML) INJECTION PRN
Status: DISCONTINUED | OUTPATIENT
Start: 2023-08-07 | End: 2023-08-07 | Stop reason: HOSPADM

## 2023-08-07 RX ORDER — SODIUM CHLORIDE 9 MG/ML
INJECTION, SOLUTION INTRAVENOUS PRN
Status: DISCONTINUED | OUTPATIENT
Start: 2023-08-07 | End: 2023-08-07 | Stop reason: HOSPADM

## 2023-08-07 RX ORDER — PROPOFOL 10 MG/ML
INJECTION, EMULSION INTRAVENOUS CONTINUOUS PRN
Status: DISCONTINUED | OUTPATIENT
Start: 2023-08-07 | End: 2023-08-07 | Stop reason: SDUPTHER

## 2023-08-07 RX ORDER — SODIUM CHLORIDE 0.9 % (FLUSH) 0.9 %
5-40 SYRINGE (ML) INJECTION EVERY 12 HOURS SCHEDULED
Status: DISCONTINUED | OUTPATIENT
Start: 2023-08-07 | End: 2023-08-07 | Stop reason: HOSPADM

## 2023-08-07 RX ORDER — SODIUM CHLORIDE, SODIUM LACTATE, POTASSIUM CHLORIDE, CALCIUM CHLORIDE 600; 310; 30; 20 MG/100ML; MG/100ML; MG/100ML; MG/100ML
INJECTION, SOLUTION INTRAVENOUS CONTINUOUS
Status: DISCONTINUED | OUTPATIENT
Start: 2023-08-07 | End: 2023-08-07 | Stop reason: HOSPADM

## 2023-08-07 RX ORDER — PROPOFOL 10 MG/ML
INJECTION, EMULSION INTRAVENOUS PRN
Status: DISCONTINUED | OUTPATIENT
Start: 2023-08-07 | End: 2023-08-07 | Stop reason: SDUPTHER

## 2023-08-07 RX ADMIN — PROPOFOL 20 MG: 10 INJECTION, EMULSION INTRAVENOUS at 08:55

## 2023-08-07 RX ADMIN — PROPOFOL 200 MCG/KG/MIN: 10 INJECTION, EMULSION INTRAVENOUS at 09:01

## 2023-08-07 RX ADMIN — PROPOFOL 80 MG: 10 INJECTION, EMULSION INTRAVENOUS at 08:52

## 2023-08-07 RX ADMIN — PROPOFOL 30 MG: 10 INJECTION, EMULSION INTRAVENOUS at 08:53

## 2023-08-07 RX ADMIN — SODIUM CHLORIDE, POTASSIUM CHLORIDE, SODIUM LACTATE AND CALCIUM CHLORIDE: 600; 310; 30; 20 INJECTION, SOLUTION INTRAVENOUS at 08:09

## 2023-08-07 ASSESSMENT — PAIN SCALES - GENERAL
PAINLEVEL_OUTOF10: 0

## 2023-08-07 ASSESSMENT — ENCOUNTER SYMPTOMS
TROUBLE SWALLOWING: 1
ABDOMINAL PAIN: 1
DIARRHEA: 1

## 2023-08-07 ASSESSMENT — PAIN - FUNCTIONAL ASSESSMENT: PAIN_FUNCTIONAL_ASSESSMENT: NONE - DENIES PAIN

## 2023-08-07 NOTE — H&P
Jessica Aparicio (:  1980) is a 37 y.o. female, new patient here for evaluation of the following chief complaint(s):dysphagia/bowel habit changes  No chief complaint on file. ASSESSMENT/PLAN:dysphagia, change in bowel habits: EGD/Colonoscopy  [unfilled]         Subjective   SUBJECTIVE/OBJECTIVE  Hx GERD now with occasional dysphagia, RUQ pain. Hx loose bowel movements,occasional blood in stools. Hx breast ca    Past Medical History:   Diagnosis Date    Breast cancer in female [facility])     ER 93%/PR13%/HER-2 positive; s/p chemo plus Herceptin; on AI&lupron; right breast - double mastectomy    GERD (gastroesophageal reflux disease)     daily medication    PONV (postoperative nausea and vomiting)     major surgery causes N&V needs emend before major surgery procedures usually ok with proprofol       Past Surgical History:   Procedure Laterality Date    BREAST RECONSTRUCTION      Expander [id number]     SECTION      LYMPH NODE DISSECTION N/A     OTHER SURGICAL HISTORY  05/10/2021    double masectomy    ROJAS AND BSO (CERVIX REMOVED) N/A 2023    HYSTERECTOMY ABDOMINAL TOTAL BILATERAL SALPINGO-OOPHORECTOMY performed by Chau David MD at [facility] MAIN OR             Allergies   Allergen Reactions    Clindamycin Rash    Lidocaine Swelling and Rash    Lidocaine Hcl Anaphylaxis and Swelling     Of tongue      Penicillins Rash and Hives    Prohance [Gadoteridol] Hives and Dizziness or Vertigo    Iodinated Contrast Media Hives    Iodine Swelling    Lidocaine Hcl     Penicillin G     Shellfish Allergy Hives    Sulfa Antibiotics      Other reaction(s): Rash-Allergy    Sulfamethoxazole-Trimethoprim      Other reaction(s): Rash-Allergy    Trimethoprim     Adhesive Tape Rash    Clindamycin/Lincomycin Rash          Review of Systems   HENT:  Positive for trouble swallowing. Gastrointestinal:  Positive for abdominal pain and diarrhea.             Objective   Physical Exam  HENT:      Head:

## 2023-08-07 NOTE — PROGRESS NOTES
0940-Discharge instructions were reviewed with patient and pts , Madison Diego. An opportunity was given for questions. Patient and Madison Diego verbalized understanding, and has no questions at this time. 1000-To lobby via wheelchair accompanied by staff. Discharged to home in good condition via private vehicle.

## 2023-08-07 NOTE — PROCEDURES
Operative Report    Patient: Garry Spencer MRN: [id number]      YOB: 1980  Age: 37 y.o. Sex: female            Indications: [email or web address]     Preoperative Evaluation: The patient was evaluated prior to the procedure in the GI lab admission area, the patient ASA was recorded . Consent was obtained from the patient with the risk of perforation bleeding and aspiration. Anesthesia: MARINA-per anesthesia    Complications: None; patient tolerated the procedure well. EBL -insignificant      Procedure: The patient was sedated in the left lateral decubitus position. Scope was advanced from the rectum to the ileum. Evaluation was performed to the cecum twice. The scope was withdrawn to the rectum, retroflexed view was performed. The rectal exam was normal.  Preparation was good Henderson score of 3/3/3:9 . Findings:   Exam to the ileum. Normal ileal mucosa that was biopsied. .  Normal colon mucosa with  Normal vascular pattern noted throughout the exam.  Random right and left-sided colon biopsies were taken to rule out the possibility of microscopic colitis. No sign of polyps noted. Postoperative Diagnosis: Possible irritable bowel syndrome    34838 Colonoscopy, Flexible; with biopsy, single or multiple      Recommendations:   - Await pathology.       Signed By:  Geni De La Rosa MD     August 7, 2023

## 2023-08-07 NOTE — DISCHARGE INSTRUCTIONS
Gastrointestinal Esophagogastroduodenoscopy (EGD) - Upper Exam Discharge Instructions    1. Call Dr. Lay Bustos at [phone] for any problems or questions. 2. Contact the doctor's office for follow up appointment as directed. 3. Medication may cause drowsiness for several hours, therefore:  Do not drive or operate machinery for remainder of the day. No alcohol today. Do not make any important or legal decisions for 24 hours. Do not sign any legal documents for 24 hours. 5. Ordinarily, you may resume regular diet and activity after exam unless otherwise specified by your physician. 6. For mild soreness in your throat you may use Cepacol throat lozenges or warm salt-water gargles as needed. Gastrointestinal Colonoscopy/Flexible Sigmoidoscopy - Lower Exam Discharge Instructions    Because of air put into your colon during exam, you may experience some abdominal distension, relieved by the passage of gas, for several hours. Contact your physician if you have any of the following:  Excessive amount of bleeding - large amount when having a bowel movement. Occasional specks of blood with bowel movement would not be unusual.  Severe abdominal pain  Fever or Chills    Any additional instructions: Follow up pathology as directed.

## 2023-08-07 NOTE — PROCEDURES
Endoscopy Note          Operative Report    Patient: Alexus Lyle MRN: [id number]      YOB: 1980  Age: 37 y.o. Sex: female            Indications:   Dysphagia    Preoperative Evaluation: The patient was evaluated prior to the procedure in the GI lab admission area, the patient ASA was recorded . Consent was obtained from the patient with the risk of perforation bleeding and aspiration. Anesthesia: MARINA-per anesthesia    Complication: None; patient tolerated the procedure well. Estimated blood loss: insignificant    Procedure: The patient was sedated into the left lateral decubitus position. Scope was advanced from the mouth to the third portion of duodenum. Scope was withdrawn to the stomach and retroflexed view was performed. Esophageal examination was performed. Findings: Normal upper and mid esophageal because of the severe hiatal hernia with a nonobstructive ring broken down using 20 L meniscal balloon. Linear erosive antral gastritis biopsy from the antrum second. Open pylorus. Normal descending duodenum mucosa, biopsies taken to rule out villous atrophy    Postoperative Diagnosis: Hiatal hernia. Benign esophageal stricture/ring.   Chronic gastritis    92003 Esophagogastroduodenoscopy, Flexible, transoral; biopsy; single or multiple and 01316 Esophagogastroduodenoscopy, Flexible, transoral; transendoscopic balloon dilation of esophagus      Recommendations: Await Pathology      Signed By:  Maria Luisa Salazar MD     August 7, 2023

## 2023-08-09 ENCOUNTER — TELEPHONE (OUTPATIENT)
Dept: GASTROENTEROLOGY | Age: 43
End: 2023-08-09

## 2023-08-09 NOTE — TELEPHONE ENCOUNTER
I Called patient to schedule a post follow up visit, no answer LVM to contact office to schedule a post follow up visit

## 2023-08-14 ENCOUNTER — TELEPHONE (OUTPATIENT)
Dept: GASTROENTEROLOGY | Age: 43
End: 2023-08-14

## 2023-08-18 ENCOUNTER — TELEPHONE (OUTPATIENT)
Dept: GASTROENTEROLOGY | Age: 43
End: 2023-08-18

## 2023-08-18 NOTE — TELEPHONE ENCOUNTER
Called patient with EGD/colonoscopy results:    DIAGNOSIS        A:  \" DUODENAL BIOPSIES\":        FRAGMENTS OF HISTOLOGICALLY UNREMARKABLE SMALL INTESTINE          B:  \" ANTRUM BIOPSIES\":        CHANGES CONSISTENT WITH MINIMAL CHRONIC INACTIVE GASTRITIS          C:  \" TERMINAL ILEUM BIOPSIES\":        FRAGMENTS OF HISTOLOGICALLY UNREMARKABLE SMALL INTESTINE          D:  \" RANDOM COLON BIOPSIES\":        FRAGMENTS OF LARGE INTESTINE HAVING FOCAL EXTRAVASATION OF RED BLOOD   CELLS IN LAMINA PROPRIA WITHOUT SIGNIFICANT ACUTE INFLAMMATION OR   GRANULOMA     Findings: Normal upper and mid esophageal because of the severe hiatal hernia with a nonobstructive ring broken down using 20 L meniscal balloon. Linear erosive antral gastritis biopsy from the antrum second. Open pylorus. Normal descending duodenum mucosa, biopsies taken to rule out villous atrophy     Postoperative Diagnosis: Hiatal hernia. Benign esophageal stricture/ring. Chronic gastritis     Findings:   Exam to the ileum. Normal ileal mucosa that was biopsied. .  Normal colon mucosa with  Normal vascular pattern noted throughout the exam.  Random right and left-sided colon biopsies were taken to rule out the possibility of microscopic colitis. No sign of polyps noted. Postoperative Diagnosis: Possible irritable bowel syndrome    Results reviewed with patient. Patient scheduled follow up appointment for 9/1 @ 9:00 as she still has some questions to address, specifically with Dr Brennan Manuel.

## 2023-08-28 ENCOUNTER — OFFICE VISIT (OUTPATIENT)
Dept: GASTROENTEROLOGY | Age: 43
End: 2023-08-28
Payer: COMMERCIAL

## 2023-08-28 VITALS
HEART RATE: 69 BPM | RESPIRATION RATE: 16 BRPM | OXYGEN SATURATION: 96 % | SYSTOLIC BLOOD PRESSURE: 120 MMHG | HEIGHT: 70 IN | DIASTOLIC BLOOD PRESSURE: 81 MMHG | WEIGHT: 145.8 LBS | TEMPERATURE: 97.2 F | BODY MASS INDEX: 20.87 KG/M2

## 2023-08-28 DIAGNOSIS — K22.2 ESOPHAGEAL RING: ICD-10-CM

## 2023-08-28 DIAGNOSIS — K29.40 CHRONIC EROSIVE GASTRITIS: ICD-10-CM

## 2023-08-28 DIAGNOSIS — K44.9 HIATAL HERNIA: Primary | ICD-10-CM

## 2023-08-28 DIAGNOSIS — K21.9 GASTROESOPHAGEAL REFLUX DISEASE, UNSPECIFIED WHETHER ESOPHAGITIS PRESENT: ICD-10-CM

## 2023-08-28 PROCEDURE — 99214 OFFICE O/P EST MOD 30 MIN: CPT | Performed by: PHYSICIAN ASSISTANT

## 2023-08-28 RX ORDER — ESOMEPRAZOLE MAGNESIUM 40 MG/1
40 CAPSULE, DELAYED RELEASE ORAL 2 TIMES DAILY
Qty: 180 CAPSULE | Refills: 0 | Status: SHIPPED | OUTPATIENT
Start: 2023-08-28 | End: 2023-11-26

## 2023-08-28 RX ORDER — SUCRALFATE 1 G/1
1 TABLET ORAL
Qty: 120 TABLET | Refills: 1 | Status: SHIPPED | OUTPATIENT
Start: 2023-08-28 | End: 2023-10-27

## 2023-08-28 NOTE — PROGRESS NOTES
Kristan Grace (:  1980) is a 37 y.o. female new patient referred to our office for evaluation of the following chief complaint(s):  Follow-up           ASSESSMENT/PLAN:  1. Hiatal hernia  2. Esophageal ring  Assessment & Plan:   S/p dilation on EGD . Dysphagia is improved. 3. Chronic erosive gastritis  Assessment & Plan:  Noted on EGD . Pathology negative for H. pylori. Prescribed Nexium 20 mg QD and Carafate 1 g QID at last OV. Counseled patient and provided written recommendations for diet and lifestyle modifications for GERD. Avoid tobacco, alcohol, NSAIDs. Orders:  -     esomeprazole (NEXIUM) 40 MG delayed release capsule; Take 1 capsule by mouth in the morning and at bedtime, Disp-180 capsule, R-0Normal  -     sucralfate (CARAFATE) 1 GM tablet; Take 1 tablet by mouth 4 times daily (before meals and nightly), Disp-120 tablet, R-1Normal  4. Gastroesophageal reflux disease, unspecified whether esophagitis present           Next surveillance colonoscopy due in 5 years - 2028. Hiatal hernia is small - no intervention warranted currently. Increase Nexium to 40 mg BID. Continue Carafate. Today abdominal pain is more LUQ than RUQ in location. Overall symptoms seem consistent with EGD findings. Follow-up 3 months; if still having uncontrolled GERD or ongoing abdominal pain consider additional work-up, imaging, etc. Call in the interim for any worsening of symptoms or failure to improve. Subjective   SUBJECTIVE/OBJECTIVE  Kristan Grace is a 37y.o. year old female who was initially evaluated in my office 6/15 for symptoms of GERD, dysphagia, right upper quadrant pain, and loose stools. She was given Rx for Nexium 20 mg QD, Carafate 1 g QID, and Bentyl 10 mg BID. Remote right upper quadrant ultrasound 2022 was unremarkable with no gallstones. Plan was to proceed with EGD and colonoscopy and further biliary work-up if that was unrevealing.  Family hx is positive for colon

## 2023-08-28 NOTE — ASSESSMENT & PLAN NOTE
Noted on EGD 8/7. Pathology negative for H. pylori. Prescribed Nexium 20 mg QD and Carafate 1 g QID at last OV. Counseled patient and provided written recommendations for diet and lifestyle modifications for GERD. Avoid tobacco, alcohol, NSAIDs.

## 2023-09-13 DIAGNOSIS — G57.30 PERONEAL NEUROPATHY, UNSPECIFIED LATERALITY: ICD-10-CM

## 2023-09-13 DIAGNOSIS — Z51.81 ENCOUNTER FOR MONITORING AROMATASE INHIBITOR THERAPY: Primary | ICD-10-CM

## 2023-09-13 DIAGNOSIS — Z79.811 ENCOUNTER FOR MONITORING AROMATASE INHIBITOR THERAPY: Primary | ICD-10-CM

## 2023-09-21 ENCOUNTER — APPOINTMENT (RX ONLY)
Dept: URBAN - METROPOLITAN AREA CLINIC 329 | Facility: CLINIC | Age: 43
Setting detail: DERMATOLOGY
End: 2023-09-21

## 2023-09-21 DIAGNOSIS — D17 BENIGN LIPOMATOUS NEOPLASM: ICD-10-CM

## 2023-09-21 DIAGNOSIS — L72.0 EPIDERMAL CYST: ICD-10-CM

## 2023-09-21 DIAGNOSIS — L57.8 OTHER SKIN CHANGES DUE TO CHRONIC EXPOSURE TO NONIONIZING RADIATION: ICD-10-CM

## 2023-09-21 DIAGNOSIS — Z85.3 PERSONAL HISTORY OF MALIGNANT NEOPLASM OF BREAST: ICD-10-CM

## 2023-09-21 PROBLEM — D17.21 BENIGN LIPOMATOUS NEOPLASM OF SKIN AND SUBCUTANEOUS TISSUE OF RIGHT ARM: Status: ACTIVE | Noted: 2023-09-21

## 2023-09-21 PROCEDURE — ? SUNSCREEN RECOMMENDATIONS

## 2023-09-21 PROCEDURE — ? FULL BODY SKIN EXAM - DECLINED

## 2023-09-21 PROCEDURE — ? PRESCRIPTION MEDICATION MANAGEMENT

## 2023-09-21 PROCEDURE — 99213 OFFICE O/P EST LOW 20 MIN: CPT

## 2023-09-21 PROCEDURE — ? COUNSELING

## 2023-09-21 PROCEDURE — ? PRESCRIPTION

## 2023-09-21 RX ORDER — CLINDAMYCIN PHOSPHATE 10 MG/ML
SOLUTION TOPICAL ONCE A DAY
Qty: 30 | Refills: 2 | Status: ERX | COMMUNITY
Start: 2023-09-21

## 2023-09-21 RX ADMIN — CLINDAMYCIN PHOSPHATE: 10 SOLUTION TOPICAL at 00:00

## 2023-09-21 ASSESSMENT — LOCATION ZONE DERM
LOCATION ZONE: ARM
LOCATION ZONE: FACE
LOCATION ZONE: FACE

## 2023-09-21 ASSESSMENT — LOCATION SIMPLE DESCRIPTION DERM
LOCATION SIMPLE: RIGHT FOREARM
LOCATION SIMPLE: SUPERIOR FOREHEAD
LOCATION SIMPLE: SUPERIOR FOREHEAD
LOCATION SIMPLE: LEFT EYEBROW

## 2023-09-21 ASSESSMENT — LOCATION DETAILED DESCRIPTION DERM
LOCATION DETAILED: SUPERIOR MID FOREHEAD
LOCATION DETAILED: RIGHT VENTRAL PROXIMAL FOREARM
LOCATION DETAILED: LEFT LATERAL EYEBROW
LOCATION DETAILED: SUPERIOR MID FOREHEAD

## 2023-09-21 NOTE — HPI: EVALUATION OF SKIN LESION(S)
73
What Type Of Note Output Would You Prefer (Optional)?: Bullet Format
Hpi Title: Evaluation of Skin Lesions
How Severe Are Your Spot(S)?: mild
Have Your Spot(S) Been Treated In The Past?: has not been treated
Additional History: Pt reports with two areas of concern Spot on right forearm that feels like it’s going under the skin. Looked bumpy when first appeared and can feel it if touching that area. Wears compression sleeve after BRCA txt and lymph node removal. No hx of MM or NMSC. Also has pot under left eyebrow that feels oily. Pt declines FBE because she has one scheduled for 11/08/23.

## 2023-09-21 NOTE — PROCEDURE: PRESCRIPTION MEDICATION MANAGEMENT
Render In Strict Bullet Format?: No
Plan: Follow up when pt returns for FBE in November.
Detail Level: Zone
Initiate Treatment: Clindamycin topical solution - use sponge to apply to affected area once daily as needed until cyst goes away. If cyst goes away, solution may be applied as need or continued to be applied once daily for prevention.

## 2023-09-27 ENCOUNTER — HOSPITAL ENCOUNTER (OUTPATIENT)
Dept: PHYSICAL THERAPY | Age: 43
Setting detail: RECURRING SERIES
Discharge: HOME | End: 2023-09-30
Payer: COMMERCIAL

## 2023-09-27 PROCEDURE — 97140 MANUAL THERAPY 1/> REGIONS: CPT

## 2023-09-27 ASSESSMENT — PAIN SCALES - GENERAL: PAINLEVEL_OUTOF10: 0

## 2023-09-27 NOTE — THERAPY DISCHARGE
S[facility]   10/30/2023  9:15 AM Phoebe Holguin MD [facility] AMB   11/16/2023 10:50 AM Carol Ann Cooper MD [facility] AMB   11/28/2023  8:30 AM Ash Pereyra PA-C [facility] AMB   2/2/2024  1:30 PM [facility]HER[facility]   2/2/2024  2:00 PM DAVID Lockwood NP [facility] AMB   8/2/2024  2:30 PM [address] [place] [address] [address] [place] [address]   8/2/2024  3:00 PM Francie Mills MD [facility] AMB

## 2023-09-29 ENCOUNTER — OFFICE VISIT (OUTPATIENT)
Dept: SURGERY | Age: 43
End: 2023-09-29
Payer: COMMERCIAL

## 2023-09-29 ENCOUNTER — PREP FOR PROCEDURE (OUTPATIENT)
Dept: SURGERY | Age: 43
End: 2023-09-29

## 2023-09-29 VITALS
DIASTOLIC BLOOD PRESSURE: 79 MMHG | HEART RATE: 78 BPM | WEIGHT: 143.6 LBS | SYSTOLIC BLOOD PRESSURE: 118 MMHG | HEIGHT: 70 IN | BODY MASS INDEX: 20.56 KG/M2

## 2023-09-29 DIAGNOSIS — R22.30 SHOULDER MASS: ICD-10-CM

## 2023-09-29 DIAGNOSIS — M79.89 MASS OF SOFT TISSUE OF SHOULDER: Primary | ICD-10-CM

## 2023-09-29 PROCEDURE — 99204 OFFICE O/P NEW MOD 45 MIN: CPT | Performed by: SURGERY

## 2023-09-29 ASSESSMENT — ENCOUNTER SYMPTOMS
RESPIRATORY NEGATIVE: 1
ALLERGIC/IMMUNOLOGIC NEGATIVE: 1
EYES NEGATIVE: 1
GASTROINTESTINAL NEGATIVE: 1

## 2023-10-11 ENCOUNTER — HOSPITAL ENCOUNTER (OUTPATIENT)
Dept: MAMMOGRAPHY | Age: 43
Discharge: HOME | End: 2023-10-14
Attending: INTERNAL MEDICINE
Payer: COMMERCIAL

## 2023-10-11 DIAGNOSIS — Z51.81 ENCOUNTER FOR MONITORING AROMATASE INHIBITOR THERAPY: ICD-10-CM

## 2023-10-11 DIAGNOSIS — Z79.811 ENCOUNTER FOR MONITORING AROMATASE INHIBITOR THERAPY: ICD-10-CM

## 2023-10-11 PROCEDURE — 77080 DXA BONE DENSITY AXIAL: CPT

## 2023-10-16 ENCOUNTER — TELEPHONE (OUTPATIENT)
Dept: ONCOLOGY | Age: 43
End: 2023-10-16

## 2023-10-16 NOTE — TELEPHONE ENCOUNTER
Physician provider: Wallace Smith MD  Reason for today's call: Medical questions  Last office visit:08/02/23    Patient notified that their information will be routed to the [facility] clinical triage team for review. Patient is advised that they will receive a phone call from the triage department. If symptoms worsen before receiving a call back, the patient has been advised to proceed to the nearest ED. Pt request a call back with some questions she has about the Bone Density Appt she had on 10/11. Pt  also has some general questions regarding medication Anastrozole. Pt request a call back from Nurse.

## 2023-10-24 ENCOUNTER — TELEPHONE (OUTPATIENT)
Dept: ONCOLOGY | Age: 43
End: 2023-10-24

## 2023-10-24 RX ORDER — ANASTROZOLE 1 MG/1
1 TABLET ORAL DAILY
Qty: 30 TABLET | Refills: 3 | Status: SHIPPED | OUTPATIENT
Start: 2023-10-24

## 2023-10-24 NOTE — TELEPHONE ENCOUNTER
Physician provider: Kaden Cuba MD  Reason for today's call: medication refill   Last office visit:8/2/2023    Patient notified that their information will be routed to the [facility] clinical triage team for review. Patient is advised that they will receive a phone call from the triage department.          Medication refill: Arimidex 1 Mg      Pharmacy:  [facility]

## 2023-10-27 DIAGNOSIS — Z17.0 MALIGNANT NEOPLASM OF CENTRAL PORTION OF RIGHT BREAST IN FEMALE, ESTROGEN RECEPTOR POSITIVE (HCC): Primary | ICD-10-CM

## 2023-10-27 DIAGNOSIS — C50.111 MALIGNANT NEOPLASM OF CENTRAL PORTION OF RIGHT BREAST IN FEMALE, ESTROGEN RECEPTOR POSITIVE (HCC): Primary | ICD-10-CM

## 2023-10-30 ENCOUNTER — HOSPITAL ENCOUNTER (OUTPATIENT)
Dept: LAB | Age: 43
Discharge: HOME | End: 2023-11-02
Payer: COMMERCIAL

## 2023-10-30 DIAGNOSIS — Z17.0 MALIGNANT NEOPLASM OF CENTRAL PORTION OF RIGHT BREAST IN FEMALE, ESTROGEN RECEPTOR POSITIVE (HCC): ICD-10-CM

## 2023-10-30 DIAGNOSIS — C50.111 MALIGNANT NEOPLASM OF CENTRAL PORTION OF RIGHT BREAST IN FEMALE, ESTROGEN RECEPTOR POSITIVE (HCC): ICD-10-CM

## 2023-10-30 LAB
ALBUMIN SERPL-MCNC: 4.2 G/DL (ref 3.5–5)
ALBUMIN/GLOB SERPL: 1.4 (ref 0.4–1.6)
ALP SERPL-CCNC: 101 U/L (ref 50–136)
ALT SERPL-CCNC: 24 U/L (ref 12–65)
ANION GAP SERPL CALC-SCNC: 4 MMOL/L (ref 2–11)
AST SERPL-CCNC: 19 U/L (ref 15–37)
BASOPHILS # BLD: 0 K/UL (ref 0–0.2)
BASOPHILS NFR BLD: 0 % (ref 0–2)
BILIRUB SERPL-MCNC: 0.5 MG/DL (ref 0.2–1.1)
BUN SERPL-MCNC: 13 MG/DL (ref 6–23)
CALCIUM SERPL-MCNC: 9.4 MG/DL (ref 8.3–10.4)
CHLORIDE SERPL-SCNC: 106 MMOL/L (ref 101–110)
CO2 SERPL-SCNC: 27 MMOL/L (ref 21–32)
CREAT SERPL-MCNC: 0.8 MG/DL (ref 0.6–1)
DIFFERENTIAL METHOD BLD: NORMAL
EOSINOPHIL # BLD: 0.1 K/UL (ref 0–0.8)
EOSINOPHIL NFR BLD: 1 % (ref 0.5–7.8)
ERYTHROCYTE [DISTWIDTH] IN BLOOD BY AUTOMATED COUNT: 12 % (ref 11.9–14.6)
ESTRADIOL SERPL-MCNC: <11.8 PG/ML
GLOBULIN SER CALC-MCNC: 3.1 G/DL (ref 2.8–4.5)
GLUCOSE SERPL-MCNC: 92 MG/DL (ref 65–100)
HCT VFR BLD AUTO: 41.9 % (ref 35.8–46.3)
HGB BLD-MCNC: 14.3 G/DL (ref 11.7–15.4)
IMM GRANULOCYTES # BLD AUTO: 0 K/UL (ref 0–0.5)
IMM GRANULOCYTES NFR BLD AUTO: 0 % (ref 0–5)
LYMPHOCYTES # BLD: 1.5 K/UL (ref 0.5–4.6)
LYMPHOCYTES NFR BLD: 22 % (ref 13–44)
MCH RBC QN AUTO: 29.3 PG (ref 26.1–32.9)
MCHC RBC AUTO-ENTMCNC: 34.1 G/DL (ref 31.4–35)
MCV RBC AUTO: 85.9 FL (ref 82–102)
MONOCYTES # BLD: 0.2 K/UL (ref 0.1–1.3)
MONOCYTES NFR BLD: 4 % (ref 4–12)
NEUTS SEG # BLD: 4.9 K/UL (ref 1.7–8.2)
NEUTS SEG NFR BLD: 73 % (ref 43–78)
NRBC # BLD: 0 K/UL (ref 0–0.2)
PLATELET # BLD AUTO: 210 K/UL (ref 150–450)
PMV BLD AUTO: 9.6 FL (ref 9.4–12.3)
POTASSIUM SERPL-SCNC: 3.7 MMOL/L (ref 3.5–5.1)
PROT SERPL-MCNC: 7.3 G/DL (ref 6.3–8.2)
RBC # BLD AUTO: 4.88 M/UL (ref 4.05–5.2)
SODIUM SERPL-SCNC: 137 MMOL/L (ref 133–143)
WBC # BLD AUTO: 6.8 K/UL (ref 4.3–11.1)

## 2023-10-30 PROCEDURE — 36415 COLL VENOUS BLD VENIPUNCTURE: CPT

## 2023-10-30 PROCEDURE — 82670 ASSAY OF TOTAL ESTRADIOL: CPT

## 2023-10-30 PROCEDURE — 85025 COMPLETE CBC W/AUTO DIFF WBC: CPT

## 2023-10-30 PROCEDURE — 80053 COMPREHEN METABOLIC PANEL: CPT

## 2023-11-02 ENCOUNTER — OFFICE VISIT (OUTPATIENT)
Dept: ONCOLOGY | Age: 43
End: 2023-11-02
Payer: COMMERCIAL

## 2023-11-02 VITALS
HEART RATE: 83 BPM | RESPIRATION RATE: 16 BRPM | BODY MASS INDEX: 20.09 KG/M2 | WEIGHT: 140 LBS | SYSTOLIC BLOOD PRESSURE: 118 MMHG | OXYGEN SATURATION: 100 % | DIASTOLIC BLOOD PRESSURE: 75 MMHG | TEMPERATURE: 97.9 F

## 2023-11-02 DIAGNOSIS — T45.1X5A HOT FLASHES RELATED TO AROMATASE INHIBITOR THERAPY: ICD-10-CM

## 2023-11-02 DIAGNOSIS — M25.50 ARTHRALGIA, UNSPECIFIED JOINT: ICD-10-CM

## 2023-11-02 DIAGNOSIS — R23.2 HOT FLASHES RELATED TO AROMATASE INHIBITOR THERAPY: ICD-10-CM

## 2023-11-02 DIAGNOSIS — C50.111 MALIGNANT NEOPLASM OF CENTRAL PORTION OF RIGHT BREAST IN FEMALE, ESTROGEN RECEPTOR POSITIVE (HCC): Primary | ICD-10-CM

## 2023-11-02 DIAGNOSIS — Z17.0 MALIGNANT NEOPLASM OF CENTRAL PORTION OF RIGHT BREAST IN FEMALE, ESTROGEN RECEPTOR POSITIVE (HCC): Primary | ICD-10-CM

## 2023-11-02 DIAGNOSIS — R53.83 FATIGUE DUE TO TREATMENT: ICD-10-CM

## 2023-11-02 PROCEDURE — 99214 OFFICE O/P EST MOD 30 MIN: CPT | Performed by: NURSE PRACTITIONER

## 2023-11-02 ASSESSMENT — PATIENT HEALTH QUESTIONNAIRE - PHQ9
SUM OF ALL RESPONSES TO PHQ QUESTIONS 1-9: 0
1. LITTLE INTEREST OR PLEASURE IN DOING THINGS: 0
SUM OF ALL RESPONSES TO PHQ9 QUESTIONS 1 & 2: 0
SUM OF ALL RESPONSES TO PHQ QUESTIONS 1-9: 0
2. FEELING DOWN, DEPRESSED OR HOPELESS: 0

## 2023-11-08 ENCOUNTER — APPOINTMENT (RX ONLY)
Dept: URBAN - METROPOLITAN AREA CLINIC 329 | Facility: CLINIC | Age: 43
Setting detail: DERMATOLOGY
End: 2023-11-08

## 2023-11-08 DIAGNOSIS — D18.0 HEMANGIOMA: ICD-10-CM

## 2023-11-08 DIAGNOSIS — L72.0 EPIDERMAL CYST: ICD-10-CM

## 2023-11-08 DIAGNOSIS — D22 MELANOCYTIC NEVI: ICD-10-CM

## 2023-11-08 DIAGNOSIS — L81.4 OTHER MELANIN HYPERPIGMENTATION: ICD-10-CM

## 2023-11-08 DIAGNOSIS — L57.8 OTHER SKIN CHANGES DUE TO CHRONIC EXPOSURE TO NONIONIZING RADIATION: ICD-10-CM

## 2023-11-08 DIAGNOSIS — L82.1 OTHER SEBORRHEIC KERATOSIS: ICD-10-CM

## 2023-11-08 PROBLEM — D18.01 HEMANGIOMA OF SKIN AND SUBCUTANEOUS TISSUE: Status: ACTIVE | Noted: 2023-11-08

## 2023-11-08 PROBLEM — D22.72 MELANOCYTIC NEVI OF LEFT LOWER LIMB, INCLUDING HIP: Status: ACTIVE | Noted: 2023-11-08

## 2023-11-08 PROBLEM — D22.5 MELANOCYTIC NEVI OF TRUNK: Status: ACTIVE | Noted: 2023-11-08

## 2023-11-08 PROBLEM — D22.71 MELANOCYTIC NEVI OF RIGHT LOWER LIMB, INCLUDING HIP: Status: ACTIVE | Noted: 2023-11-08

## 2023-11-08 PROBLEM — D22.61 MELANOCYTIC NEVI OF RIGHT UPPER LIMB, INCLUDING SHOULDER: Status: ACTIVE | Noted: 2023-11-08

## 2023-11-08 PROCEDURE — 99213 OFFICE O/P EST LOW 20 MIN: CPT

## 2023-11-08 PROCEDURE — ? COUNSELING

## 2023-11-08 PROCEDURE — ? PRESCRIPTION

## 2023-11-08 PROCEDURE — ? FULL BODY SKIN EXAM

## 2023-11-08 PROCEDURE — ? TREATMENT REGIMEN

## 2023-11-08 PROCEDURE — ? PRESCRIPTION MEDICATION MANAGEMENT

## 2023-11-08 RX ORDER — ERYTHROMYCIN 20 MG/G
GEL TOPICAL
Qty: 60 | Refills: 2 | Status: ERX | COMMUNITY
Start: 2023-11-08

## 2023-11-08 RX ADMIN — ERYTHROMYCIN: 20 GEL TOPICAL at 00:00

## 2023-11-08 ASSESSMENT — LOCATION DETAILED DESCRIPTION DERM
LOCATION DETAILED: RIGHT CLAVICULAR SKIN
LOCATION DETAILED: RIGHT ANTERIOR DISTAL UPPER ARM
LOCATION DETAILED: LEFT ANTERIOR DISTAL UPPER ARM
LOCATION DETAILED: LEFT DISTAL CALF
LOCATION DETAILED: LEFT CENTRAL MALAR CHEEK
LOCATION DETAILED: UPPER STERNUM
LOCATION DETAILED: LEFT DISTAL POSTERIOR THIGH
LOCATION DETAILED: MID-FRONTAL SCALP
LOCATION DETAILED: RIGHT ANTERIOR DISTAL THIGH
LOCATION DETAILED: EPIGASTRIC SKIN
LOCATION DETAILED: RIGHT VENTRAL DISTAL FOREARM
LOCATION DETAILED: LEFT INFERIOR UPPER BACK
LOCATION DETAILED: LEFT LATERAL EYEBROW
LOCATION DETAILED: SUPERIOR THORACIC SPINE
LOCATION DETAILED: LEFT PROXIMAL POSTERIOR THIGH
LOCATION DETAILED: RIGHT PROXIMAL DORSAL FOREARM
LOCATION DETAILED: RIGHT SUPERIOR MEDIAL UPPER BACK

## 2023-11-08 ASSESSMENT — LOCATION SIMPLE DESCRIPTION DERM
LOCATION SIMPLE: RIGHT UPPER ARM
LOCATION SIMPLE: LEFT UPPER ARM
LOCATION SIMPLE: UPPER BACK
LOCATION SIMPLE: CHEST
LOCATION SIMPLE: LEFT CHEEK
LOCATION SIMPLE: RIGHT CLAVICULAR SKIN
LOCATION SIMPLE: RIGHT THIGH
LOCATION SIMPLE: ABDOMEN
LOCATION SIMPLE: LEFT CALF
LOCATION SIMPLE: LEFT UPPER BACK
LOCATION SIMPLE: LEFT POSTERIOR THIGH
LOCATION SIMPLE: RIGHT UPPER BACK
LOCATION SIMPLE: RIGHT FOREARM
LOCATION SIMPLE: ANTERIOR SCALP
LOCATION SIMPLE: LEFT EYEBROW

## 2023-11-08 ASSESSMENT — LOCATION ZONE DERM
LOCATION ZONE: FACE
LOCATION ZONE: FACE
LOCATION ZONE: LEG
LOCATION ZONE: SCALP
LOCATION ZONE: TRUNK
LOCATION ZONE: ARM

## 2023-11-08 NOTE — PROCEDURE: PRESCRIPTION MEDICATION MANAGEMENT
Render In Strict Bullet Format?: No
Plan: Follow up when pt returns for FBE in November.
Detail Level: Zone
Initiate Treatment: erythromycin gel - apply to affected area once daily as needed until cyst goes away
No

## 2023-11-08 NOTE — HPI: EVALUATION OF SKIN LESION(S)
What Type Of Note Output Would You Prefer (Optional)?: Bullet Format
Hpi Title: Evaluation of Skin Lesions
Additional History: Pt reporting for annual FBE. no areas of concern. Hx of BRCA and double mastectomy 2 years ago. Pt was prescribed clindamycin phosphate solution for a cyst but she can’t use it because she’s allergic.

## 2023-11-16 ENCOUNTER — OFFICE VISIT (OUTPATIENT)
Dept: FAMILY MEDICINE CLINIC | Facility: CLINIC | Age: 43
End: 2023-11-16
Payer: COMMERCIAL

## 2023-11-16 VITALS
HEIGHT: 70 IN | BODY MASS INDEX: 20.01 KG/M2 | DIASTOLIC BLOOD PRESSURE: 70 MMHG | SYSTOLIC BLOOD PRESSURE: 108 MMHG | WEIGHT: 139.8 LBS

## 2023-11-16 DIAGNOSIS — R09.81 HEAD CONGESTION: ICD-10-CM

## 2023-11-16 DIAGNOSIS — Z17.0 MALIGNANT NEOPLASM OF CENTRAL PORTION OF RIGHT BREAST IN FEMALE, ESTROGEN RECEPTOR POSITIVE (HCC): ICD-10-CM

## 2023-11-16 DIAGNOSIS — H69.81 CHRONIC EUSTACHIAN TUBE DYSFUNCTION, RIGHT: ICD-10-CM

## 2023-11-16 DIAGNOSIS — C50.111 MALIGNANT NEOPLASM OF CENTRAL PORTION OF RIGHT BREAST IN FEMALE, ESTROGEN RECEPTOR POSITIVE (HCC): ICD-10-CM

## 2023-11-16 DIAGNOSIS — M25.532 LEFT WRIST PAIN: Primary | ICD-10-CM

## 2023-11-16 PROCEDURE — 99203 OFFICE O/P NEW LOW 30 MIN: CPT | Performed by: FAMILY MEDICINE

## 2023-11-16 ASSESSMENT — PATIENT HEALTH QUESTIONNAIRE - PHQ9
SUM OF ALL RESPONSES TO PHQ QUESTIONS 1-9: 0
SUM OF ALL RESPONSES TO PHQ QUESTIONS 1-9: 0
1. LITTLE INTEREST OR PLEASURE IN DOING THINGS: 0
SUM OF ALL RESPONSES TO PHQ QUESTIONS 1-9: 0
SUM OF ALL RESPONSES TO PHQ QUESTIONS 1-9: 0

## 2023-11-16 NOTE — PROGRESS NOTES
SUBJECTIVE:   Best Landon is a 37 y.o. female presents today as a new patient accompanied by her  requesting access to healthcare. Patient reports and review of systems that she has been experiencing a several week history of left wrist pain. This pain is worse with movement of her thumb. It is at the base of the wrist as it attaches into the hand she states. No trauma reported. No swelling. In addition the patient reports that she has had some fullness in her right cheek and right ear for about 2 weeks. No fever, cough, body aches or drainage. PMH. Breast cancer status post bilateral mastectomy, reflux, hiatal hernia, allergic rhinitis, IBS    PSH. Total hysterectomy,  x1, bilateral breast mastectomy    Allergies. Listed in the EMR and reviewed today. Social.  Denies tobacco or alcohol use. Is . 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 Has 2 children. Current medicines. Listed in the EMR and reviewed today. Family history. Mother healthy. Father diabetes and hypertension. HPI  See above    Past Medical History, Past Surgical History, Family history, Social History, and Medications were all reviewed with the patient today and updated as necessary. Current Outpatient Medications   Medication Sig Dispense Refill    anastrozole (ARIMIDEX) 1 MG tablet Take 1 tablet by mouth daily 30 tablet 3    dicyclomine (BENTYL) 10 MG capsule Take 1 capsule by mouth in the morning and at bedtime 60 capsule 1    loratadine (CLARITIN) 10 MG tablet Take 1 tablet by mouth daily PRN       No current facility-administered medications for this visit.      Allergies   Allergen Reactions    Clindamycin Rash    Lidocaine Swelling and Rash    Lidocaine Hcl Anaphylaxis and Swelling     Of tongue      Penicillins Rash and Hives    Prohance [Gadoteridol] Hives and Dizziness or Vertigo    Iodinated Contrast Media Hives    Iodine Swelling    Lidocaine Hcl     Penicillin G     Shellfish Allergy Hives

## 2023-11-28 ENCOUNTER — TELEPHONE (OUTPATIENT)
Dept: GASTROENTEROLOGY | Age: 43
End: 2023-11-28

## 2023-11-28 ENCOUNTER — OFFICE VISIT (OUTPATIENT)
Dept: GASTROENTEROLOGY | Age: 43
End: 2023-11-28
Payer: COMMERCIAL

## 2023-11-28 VITALS
BODY MASS INDEX: 19.67 KG/M2 | OXYGEN SATURATION: 100 % | RESPIRATION RATE: 16 BRPM | SYSTOLIC BLOOD PRESSURE: 107 MMHG | DIASTOLIC BLOOD PRESSURE: 75 MMHG | TEMPERATURE: 97.7 F | WEIGHT: 137.4 LBS | HEART RATE: 80 BPM | HEIGHT: 70 IN

## 2023-11-28 DIAGNOSIS — K29.40 CHRONIC EROSIVE GASTRITIS: ICD-10-CM

## 2023-11-28 DIAGNOSIS — R10.11 RIGHT UPPER QUADRANT PAIN: Primary | ICD-10-CM

## 2023-11-28 PROCEDURE — 99214 OFFICE O/P EST MOD 30 MIN: CPT | Performed by: PHYSICIAN ASSISTANT

## 2023-11-28 RX ORDER — MONTELUKAST SODIUM 4 MG/1
2 TABLET, CHEWABLE ORAL 2 TIMES DAILY
Qty: 60 TABLET | Refills: 3 | Status: SHIPPED | OUTPATIENT
Start: 2023-11-28

## 2023-11-28 NOTE — PATIENT INSTRUCTIONS
For reflux:   Take Nexium 40 mg QD. Try Gaviscon or Maalox as needed for fast reflux relief. Eat smaller and more frequent meals. Avoid lying down for 3 hours after eating. Elevate the head of the bed by 6 inches. Avoid wearing tight-fitting clothing. Stop smoking and avoid alcohol. Avoid NSAID medications (Aspirin, Excedrin, Aleve, Ibuprofen, Goody Powder, Toradol, Mobic, Voltaren, etc). Consider weight loss to get to a healthy weight, which is often critical to eliminating symptoms of reflux. Avoid foods and acid-containing beverages that can exacerbate the symptoms of reflux.  This includes:     -Caffeine  -Chocolate  -Peppermint  -Alcohol (especially red wine)  -Carbonated beverages  -Citrus fruits  -Tomato-based products  -Vinegar  -Spicy and greasy foods

## 2023-11-28 NOTE — PROGRESS NOTES
Jasmin John (:  1980) is a 37 y.o. female new patient referred to our office for evaluation of the following chief complaint(s):  Follow-up (C/o acid reflux on right side)           ASSESSMENT/PLAN:  1. Right upper quadrant pain  -     US ABDOMEN LIMITED; Future  2. Chronic erosive gastritis  -     colestipol (COLESTID) 1 g tablet; Take 2 tablets by mouth 2 times daily, Disp-60 tablet, R-3Normal       Counseled patient and provided written recommendations for diet and lifestyle modifications for GERD. Avoid tobacco, alcohol, NSAIDs. Continue to elevate HOB. Take Gaviscon nightly before bed and as needed for additional relief. Discussed more frequent, smaller meals. Stop Carafate, trial Colestid 2 g BID for possible bile-acid reflux. Monitor for worsening constipation - utilize stool softeners or Miralax as needed. Ok to take Nexium 40 mg QD. Check right upper quadrant ultrasound and pursue HIDA if unrevealing. Consider pH studies if biliary work-up unrevealing. Subjective   SUBJECTIVE/OBJECTIVE  Jasmin John is a 37y.o. year old female who was initially referred for GERD, dysphagia, right upper quadrant pain, and loose stools. Evaluation to-date includes:     -OV 6/15/23: given Rx for Nexium 20 mg QD, Carafate 1 g QID, and Bentyl 10 mg BID  -EGD 23 (Dr. Keegan Baez): 2 cm hiatal hernia, benign esophageal stricture/ring s/p dilation, linear erosive antral gastritis; antral biopsies negative for H. pylori, unremarkable duodenal biopsies  -Colonoscopy 23 (Dr. Keegan Baez): Homer score 9, normal colonoscopy; recall 5 years due to family hx colon cancer  -OV 23: ongoing reflux with nighttime awakenings; Nexium 40 mg BID, Carafate 1 g QID, consider additional imaging if pain persists at follow-up  -LFTs 10/30/23 WNL    Patient presents today for routine follow-up. Patient reports she is still taking Nexium BID. 1-2 weeks ago she tried to wean to QD dosing and had immediate recurrence of reflux.

## 2023-11-28 NOTE — TELEPHONE ENCOUNTER
Called patient let her know I scheduled a stat ulrtasound for tomorrow 11/29 at 10:30 am  @ [address] [place] [address] [address] Philip Franks, [address] [place] [address]

## 2023-11-29 ENCOUNTER — HOSPITAL ENCOUNTER (OUTPATIENT)
Dept: ULTRASOUND IMAGING | Age: 43
Discharge: HOME | End: 2023-12-02

## 2023-11-29 DIAGNOSIS — R10.11 RIGHT UPPER QUADRANT PAIN: ICD-10-CM

## 2023-12-04 DIAGNOSIS — R10.11 RIGHT UPPER QUADRANT PAIN: Primary | ICD-10-CM

## 2023-12-07 ENCOUNTER — PREP FOR PROCEDURE (OUTPATIENT)
Dept: SURGERY | Age: 43
End: 2023-12-07

## 2024-01-11 ENCOUNTER — TELEPHONE (OUTPATIENT)
Age: 44
End: 2024-01-11

## 2024-01-11 NOTE — TELEPHONE ENCOUNTER
----- Message from Sarah Lackey MA sent at 1/11/2024 10:48 AM EST -----    ----- Message -----  From: Grinnell, Melissa R, PA-C  Sent: 1/5/2024   9:45 AM EST  To: Sarah Lackey MA    Can you please call to check on patient? Her ultrasound was normal and she did not schedule HIDA scan. I don't see that anyone called her results. If she is still having symptoms per office visit she should schedule the HIDA.

## 2024-01-11 NOTE — TELEPHONE ENCOUNTER
Patient was called on 01/11/24@ 11:32 AM to follow -up if HIDA scan was scheduled or performed per MARTA Melendez request.     No answer. Left voice message to return call to the office for follow- up.     Patient returned call to the office@1:41 PM.  She was given information to contact Radiology Dept to schedule HIDA Scan.

## 2024-01-22 ENCOUNTER — TELEPHONE (OUTPATIENT)
Dept: ONCOLOGY | Age: 44
End: 2024-01-22

## 2024-01-22 RX ORDER — ANASTROZOLE 1 MG/1
1 TABLET ORAL DAILY
Qty: 90 TABLET | Refills: 1 | OUTPATIENT
Start: 2024-01-22

## 2024-01-22 RX ORDER — ANASTROZOLE 1 MG/1
1 TABLET ORAL DAILY
Qty: 30 TABLET | Refills: 3 | Status: SHIPPED | OUTPATIENT
Start: 2024-01-22

## 2024-01-22 NOTE — TELEPHONE ENCOUNTER
Physician provider: Delbert Jerome MD  Reason for today's call: refill  Last office visit:11/02/2023    Patient notified that their information will be routed to the [facility] clinical triage team for review. Patient is advised that they will receive a phone call from the triage department. If symptoms worsen before receiving a call back, the patient has been advised to proceed to the nearest ED.     Pt request refill for Anastrozole Pt 6 months supply       [facility]: [address], [place]

## 2024-01-22 NOTE — TELEPHONE ENCOUNTER
Medication Requested: Anastrozole    Date last prescribed: 10/24/23    Requested Pharmacy: [facility]    Action Taken: Chart reviewed, refill sent, patient notified.

## 2024-01-30 ENCOUNTER — HOSPITAL ENCOUNTER (OUTPATIENT)
Dept: NUCLEAR MEDICINE | Age: 44
Discharge: HOME | End: 2024-02-02
Payer: COMMERCIAL

## 2024-01-30 VITALS — WEIGHT: 137 LBS | BODY MASS INDEX: 19.66 KG/M2

## 2024-01-30 DIAGNOSIS — C50.111 MALIGNANT NEOPLASM OF CENTRAL PORTION OF RIGHT BREAST IN FEMALE, ESTROGEN RECEPTOR POSITIVE (HCC): Primary | ICD-10-CM

## 2024-01-30 DIAGNOSIS — R10.11 RIGHT UPPER QUADRANT PAIN: ICD-10-CM

## 2024-01-30 DIAGNOSIS — Z17.0 MALIGNANT NEOPLASM OF CENTRAL PORTION OF RIGHT BREAST IN FEMALE, ESTROGEN RECEPTOR POSITIVE (HCC): Primary | ICD-10-CM

## 2024-01-30 PROCEDURE — 6360000004 HC RX CONTRAST MEDICATION: Performed by: PHYSICIAN ASSISTANT

## 2024-01-30 PROCEDURE — 3430000000 HC RX DIAGNOSTIC RADIOPHARMACEUTICAL: Performed by: PHYSICIAN ASSISTANT

## 2024-01-30 PROCEDURE — 78227 HEPATOBIL SYST IMAGE W/DRUG: CPT

## 2024-01-30 PROCEDURE — A9537 TC99M MEBROFENIN: HCPCS | Performed by: PHYSICIAN ASSISTANT

## 2024-01-30 RX ORDER — SINCALIDE 5 UG/5ML
0.02 INJECTION, POWDER, LYOPHILIZED, FOR SOLUTION INTRAVENOUS ONCE
Status: COMPLETED | OUTPATIENT
Start: 2024-01-30 | End: 2024-01-30

## 2024-01-30 RX ORDER — KIT FOR THE PREPARATION OF TECHNETIUM TC 99M MEBROFENIN 45 MG/10ML
6.3 INJECTION, POWDER, LYOPHILIZED, FOR SOLUTION INTRAVENOUS
Status: COMPLETED | OUTPATIENT
Start: 2024-01-30 | End: 2024-01-30

## 2024-01-30 RX ADMIN — MEBROFENIN 6.3 MILLICURIE: 45 INJECTION, POWDER, LYOPHILIZED, FOR SOLUTION INTRAVENOUS at 08:29

## 2024-01-30 RX ADMIN — SINCALIDE 1.24 MCG: 5 INJECTION, POWDER, LYOPHILIZED, FOR SOLUTION INTRAVENOUS at 09:14

## 2024-02-01 ENCOUNTER — TELEPHONE (OUTPATIENT)
Dept: ONCOLOGY | Age: 44
End: 2024-02-01

## 2024-02-06 ENCOUNTER — OFFICE VISIT (OUTPATIENT)
Dept: ONCOLOGY | Age: 44
End: 2024-02-06
Payer: COMMERCIAL

## 2024-02-06 ENCOUNTER — HOSPITAL ENCOUNTER (OUTPATIENT)
Dept: LAB | Age: 44
Discharge: HOME | End: 2024-02-09
Payer: COMMERCIAL

## 2024-02-06 VITALS
HEIGHT: 70 IN | WEIGHT: 134.8 LBS | DIASTOLIC BLOOD PRESSURE: 78 MMHG | OXYGEN SATURATION: 100 % | SYSTOLIC BLOOD PRESSURE: 107 MMHG | RESPIRATION RATE: 18 BRPM | HEART RATE: 91 BPM | TEMPERATURE: 98 F | BODY MASS INDEX: 19.3 KG/M2

## 2024-02-06 DIAGNOSIS — C50.111 MALIGNANT NEOPLASM OF CENTRAL PORTION OF RIGHT BREAST IN FEMALE, ESTROGEN RECEPTOR POSITIVE (HCC): ICD-10-CM

## 2024-02-06 DIAGNOSIS — R23.2 HOT FLASHES RELATED TO AROMATASE INHIBITOR THERAPY: ICD-10-CM

## 2024-02-06 DIAGNOSIS — G62.0 CHEMOTHERAPY-INDUCED NEUROPATHY (HCC): ICD-10-CM

## 2024-02-06 DIAGNOSIS — Z17.0 MALIGNANT NEOPLASM OF CENTRAL PORTION OF RIGHT BREAST IN FEMALE, ESTROGEN RECEPTOR POSITIVE (HCC): ICD-10-CM

## 2024-02-06 DIAGNOSIS — M81.8 OSTEOPOROSIS DUE TO AROMATASE INHIBITOR: ICD-10-CM

## 2024-02-06 DIAGNOSIS — R53.83 FATIGUE DUE TO TREATMENT: ICD-10-CM

## 2024-02-06 DIAGNOSIS — T38.6X5A OSTEOPOROSIS DUE TO AROMATASE INHIBITOR: ICD-10-CM

## 2024-02-06 DIAGNOSIS — C50.111 MALIGNANT NEOPLASM OF CENTRAL PORTION OF RIGHT BREAST IN FEMALE, ESTROGEN RECEPTOR POSITIVE (HCC): Primary | ICD-10-CM

## 2024-02-06 DIAGNOSIS — T45.1X5A HOT FLASHES RELATED TO AROMATASE INHIBITOR THERAPY: ICD-10-CM

## 2024-02-06 DIAGNOSIS — Z17.0 MALIGNANT NEOPLASM OF CENTRAL PORTION OF RIGHT BREAST IN FEMALE, ESTROGEN RECEPTOR POSITIVE (HCC): Primary | ICD-10-CM

## 2024-02-06 DIAGNOSIS — T45.1X5A CHEMOTHERAPY-INDUCED NEUROPATHY (HCC): ICD-10-CM

## 2024-02-06 LAB
BASOPHILS # BLD: 0 K/UL (ref 0–0.2)
BASOPHILS NFR BLD: 0 % (ref 0–2)
DIFFERENTIAL METHOD BLD: ABNORMAL
EOSINOPHIL # BLD: 0.2 K/UL (ref 0–0.8)
EOSINOPHIL NFR BLD: 3 % (ref 0.5–7.8)
ERYTHROCYTE [DISTWIDTH] IN BLOOD BY AUTOMATED COUNT: 12.4 % (ref 11.9–14.6)
ESTRADIOL SERPL-MCNC: <11.8 PG/ML
HCT VFR BLD AUTO: 43.9 % (ref 35.8–46.3)
HGB BLD-MCNC: 14.4 G/DL (ref 11.7–15.4)
IMM GRANULOCYTES # BLD AUTO: 0 K/UL (ref 0–0.5)
IMM GRANULOCYTES NFR BLD AUTO: 0 % (ref 0–5)
LYMPHOCYTES # BLD: 1.6 K/UL (ref 0.5–4.6)
LYMPHOCYTES NFR BLD: 30 % (ref 13–44)
MCH RBC QN AUTO: 28.3 PG (ref 26.1–32.9)
MCHC RBC AUTO-ENTMCNC: 32.8 G/DL (ref 31.4–35)
MCV RBC AUTO: 86.2 FL (ref 82–102)
MONOCYTES # BLD: 0.3 K/UL (ref 0.1–1.3)
MONOCYTES NFR BLD: 5 % (ref 4–12)
NEUTS SEG # BLD: 3.2 K/UL (ref 1.7–8.2)
NEUTS SEG NFR BLD: 62 % (ref 43–78)
NRBC # BLD: 0 K/UL (ref 0–0.2)
PLATELET # BLD AUTO: 207 K/UL (ref 150–450)
PMV BLD AUTO: 9.3 FL (ref 9.4–12.3)
RBC # BLD AUTO: 5.09 M/UL (ref 4.05–5.2)
WBC # BLD AUTO: 5.2 K/UL (ref 4.3–11.1)

## 2024-02-06 PROCEDURE — 82670 ASSAY OF TOTAL ESTRADIOL: CPT

## 2024-02-06 PROCEDURE — 85025 COMPLETE CBC W/AUTO DIFF WBC: CPT

## 2024-02-06 PROCEDURE — 99214 OFFICE O/P EST MOD 30 MIN: CPT | Performed by: NURSE PRACTITIONER

## 2024-02-06 PROCEDURE — 36415 COLL VENOUS BLD VENIPUNCTURE: CPT

## 2024-02-06 RX ORDER — ESOMEPRAZOLE MAGNESIUM 40 MG/1
40 GRANULE, DELAYED RELEASE ORAL DAILY
COMMUNITY

## 2024-02-06 ASSESSMENT — PATIENT HEALTH QUESTIONNAIRE - PHQ9
1. LITTLE INTEREST OR PLEASURE IN DOING THINGS: 0
SUM OF ALL RESPONSES TO PHQ QUESTIONS 1-9: 0
2. FEELING DOWN, DEPRESSED OR HOPELESS: 0
SUM OF ALL RESPONSES TO PHQ9 QUESTIONS 1 & 2: 0
SUM OF ALL RESPONSES TO PHQ QUESTIONS 1-9: 0

## 2024-02-06 NOTE — PROGRESS NOTES
[facility] Hematology [facility] On[facility]: Office Visit Established Patient    Chief Complaint:    Chief Complaint   Patient presents with    Follow-up       History of Present Illness:  Ms. Iqbal is a 43 y.o. female who presents today for evaluation regarding breast cancer.  In the spring of 2021, she developed a palpable abnormality and pain in her left breast. Bilateral digital diagnostic mammogram 3D/2D and targeted right ultrasound on 4/1/2021 identified an abnormal calcification in the central and mid upper portion of the right breast.  A hypoechoic lesion was seen on ultrasound corresponding to the dominant collection of calcifications seen on mammogram at the 12 o'clock position areolar margin.  Since the calcifications appeared to extend over a larger area than the ultrasound finding, stereotactic biopsy was recommended for further evaluation. This was performed on 4/6/21 with pathology revealing DCIS, ER 93%/CA 17% positive.  Bilateral MRI of breasts with CAD was obtained on 4/13/2021 demonstrating extensive suspicious discontiguous clustered ring enhancement involving the right UOQ extending to the midline and just below the nipple laterally mimicking the distribution of the calcifications noted mammographically and consistent with the known DCIS; 1.4 cm separate area of clustered ring enhancement in the right axillary tail, suspicious for additional disease; and left breast with no significant enhancing lesions.  She was evaluated in consultation at [facility] on 4/15/22. Genetic counseling and testing with 84 gene panel sent on 4/15/2021 was negative for pathogenic mutation. She ultimately decided to proceed with bilateral mastectomy with immediate reconstruction and right sentinel node biopsy which was performed on 5/10/21. Pathology from the left breast showed atypical ductal hyperplasia. Pathology from the right breast showed ER 93%/PR13%/HER2 positive by FISH, poorly differentiated multifocal

## 2024-02-15 ENCOUNTER — TELEPHONE (OUTPATIENT)
Dept: GASTROENTEROLOGY | Age: 44
End: 2024-02-15

## 2024-02-15 DIAGNOSIS — K29.40 CHRONIC EROSIVE GASTRITIS: Primary | ICD-10-CM

## 2024-02-15 NOTE — TELEPHONE ENCOUNTER
Patient requesting to cut back on Nexium 40 mg 2 x's daily to 20 mg 2 x's daily she will need a refill sent in for this. She also states no one has called her to discuss the results of a procedure done by Shantanu previously. Please call patient to discuss

## 2024-02-16 ENCOUNTER — TELEPHONE (OUTPATIENT)
Dept: GASTROENTEROLOGY | Age: 44
End: 2024-02-16

## 2024-02-16 DIAGNOSIS — K82.8 BILIARY DYSKINESIA: Primary | ICD-10-CM

## 2024-02-16 NOTE — TELEPHONE ENCOUNTER
Called to review HIDA results which show decreased gallbladder EF 7% with negative pain response. No answer, left voicemail. Will refer to surgery. If still having pain would recommend she get their opinion on possible cholecystectomy for biliary dyskinesia.

## 2024-02-20 ENCOUNTER — PROCEDURE VISIT (OUTPATIENT)
Dept: NEUROLOGY | Age: 44
End: 2024-02-20
Payer: COMMERCIAL

## 2024-02-20 VITALS — OXYGEN SATURATION: 98 % | HEART RATE: 78 BPM | BODY MASS INDEX: 19.51 KG/M2 | WEIGHT: 136 LBS

## 2024-02-20 DIAGNOSIS — E53.8 B12 DEFICIENCY: ICD-10-CM

## 2024-02-20 DIAGNOSIS — R20.0 NUMBNESS AND TINGLING OF BOTH FEET: Primary | ICD-10-CM

## 2024-02-20 DIAGNOSIS — R26.81 UNSTEADY GAIT: ICD-10-CM

## 2024-02-20 DIAGNOSIS — R20.2 NUMBNESS AND TINGLING OF BOTH FEET: Primary | ICD-10-CM

## 2024-02-20 DIAGNOSIS — G57.12 MERALGIA PARAESTHETICA, LEFT: ICD-10-CM

## 2024-02-20 DIAGNOSIS — R53.1 WEAKNESS: ICD-10-CM

## 2024-02-20 DIAGNOSIS — R29.898 RIGHT LEG WEAKNESS: ICD-10-CM

## 2024-02-20 PROCEDURE — 95885 MUSC TST DONE W/NERV TST LIM: CPT | Performed by: PSYCHIATRY & NEUROLOGY

## 2024-02-20 PROCEDURE — 99204 OFFICE O/P NEW MOD 45 MIN: CPT | Performed by: PSYCHIATRY & NEUROLOGY

## 2024-02-20 PROCEDURE — 95912 NRV CNDJ TEST 11-12 STUDIES: CPT | Performed by: PSYCHIATRY & NEUROLOGY

## 2024-02-20 ASSESSMENT — ENCOUNTER SYMPTOMS: EYES NEGATIVE: 1

## 2024-02-20 ASSESSMENT — VISUAL ACUITY: OU: 1

## 2024-02-20 NOTE — PROGRESS NOTES
2024  Sandra Iqbal     Patient is referred by the following provider for consultation regarding as below:    Woman with right lower extremity intermittent problem such as dragging her right foot.  Some paresthesias.  Postchemotherapy.    Dear    Dr Jerome:::                                    NEUROLOGY     CONSULTATION                   Chief Complaint:  Right lower extremity/right foot.  No definite foot drop.  Postchemotherapy for breast CA.  Paresthesias.      43-year-old woman accompanied by her  for evaluation in regards to lower extremity abnormality.  Especially she might drag her right foot.  It appears to be intermittent.  She has some hand problems previously but these improved after she finished her chemotherapy.  She is not sure that there is any evidence for chemotherapy brain etc. with her memory but she gives a good and clear history as below.                       right handed 43 y.o.     female she does note occasional left hemispheric or vertex headache that is relatively nondescript.      * I reviewed the available and pertinent records - including eHR and Care Everywhere - notes of PMHx, PSHx, Fam Hx, and  and have examined patient with the following findings:       IMAGING REVIEW:  I REVIEWED PERTINENT  IMAGES AND REPORTS WITH THE PATIENT PERSONALLY, DIRECTLY AND FULLY.  18 extra  MINUTES.     Past Medical History:  Past Medical History:   Diagnosis Date    Breast cancer in female (HCC)     ER 93%/PR13%/HER-2 positive; s/p chemo plus Herceptin; on AI&lupron; right breast - double mastectomy    GERD (gastroesophageal reflux disease)     daily medication    PONV (postoperative nausea and vomiting)     major surgery causes N&V needs emend before major surgery procedures usually ok with proprofol       Past Surgical History:  Past Surgical History:   Procedure Laterality Date    BREAST RECONSTRUCTION      Expander [id number]     SECTION

## 2024-02-20 NOTE — PROGRESS NOTES
EMG/Nerve Conduction Study Procedure Note  [address]    [address]  [place]  [address]   [phone]      Hx:    Exam:     43 y.o. MW  female problem with dragging her right foot, paresthesia in the toes and bottom of her feet, although this following chemotherapy for breast cancer spring 2021.  Sometimes her hands were numb as well but have improved.  Lateral thigh numbness.  No diabetes.  Higher arches.  No hammertoes.  No stork legs.  Occasional left headaches.  Right foot problems sometimes.   Referring: Dr. Harpal Jerome  Technologist: Elinor Hernandez  PCP: Dr. Delbert Gaspar  Height: 5 foot 10 inch        Summary              needle EMG of selected lower extremity muscles as below.       Controlled environmental factors / EMG lab.  Temperature.   NCV : sensory segments:    Normal bilateral sural  NCV plantar sensory segments:     Normal bilateral plantar SCV.  NCV Motor MCV segments:     Normal bilateral peroneal and tibial MCV.  F-wave studies:       Normal peroneal and tibial F waves.  H-REFLEX Studies:    Normal bilateral H-reflexes.   NEEDLE EMG:   Tested muscles::    Bilateral TA MG VL muscles tested = normal equal  Normal insertional activity and interference pattern/recruitment.  No fasciculations fibrillations positive sharp waves.  Normal MUP.  No BSS AP.  No giant MUP.  No myotonia.  No upper motor neuron sign.          INTERPRETATION:     NORMAL TESTING OF BILATERAL LOWER EXTREMITIES WITH NO PHYSIOLOGIC/ELECTROPHYSIOLOGIC IDENTIFIED ABNORMALITIES.  NO MYOPATHY MYOTONIA NEUROPATHY FASCICULATIONS ETC.            CONCLUSION:      Normal testing lower extremities.      Procedure Details:       Correlates with normal testing/no definitive identification of peripheral neuropathy etc.  This does not rule out a central abnormality.    Patient made fully aware.             Please Note::     Data and waveforms * filed under Procedure category [facility].    See Procedure Files for complete data

## 2024-02-21 NOTE — TELEPHONE ENCOUNTER
I sent updated Rx for Nexium 20 mg BID AC as patient requested. I'm not sure which procedure she's referring to. I discussed her EGD and colonoscopy results by Dr. Fournier 8/7/23 at her follow-up visit with me on 8/28/23. I left a VM for her on 2/16/24 with her HIDA scan results and referred her to see surgery to evaluate low ejection fraction which can cause abdominal pain. Is there something else she's referring to? Please call to clarify. She is welcome to schedule an office visit if she has any ongoing symptoms of concern or any further questions.

## 2024-02-23 ENCOUNTER — TELEPHONE (OUTPATIENT)
Dept: NEUROLOGY | Age: 44
End: 2024-02-23

## 2024-02-23 LAB — VIT B12 SERPL-MCNC: 322 PG/ML (ref 232–1245)

## 2024-02-23 NOTE — TELEPHONE ENCOUNTER
Please reach out to patient to discuss Vitamin B12 lab results. She is not able to see the results on her Mychart. Thank you.

## 2024-02-29 ENCOUNTER — HOSPITAL ENCOUNTER (OUTPATIENT)
Dept: MRI IMAGING | Age: 44
Discharge: HOME | End: 2024-02-29
Attending: PSYCHIATRY & NEUROLOGY
Payer: COMMERCIAL

## 2024-02-29 DIAGNOSIS — R53.1 WEAKNESS: ICD-10-CM

## 2024-02-29 PROCEDURE — 70551 MRI BRAIN STEM W/O DYE: CPT

## 2024-03-01 ENCOUNTER — TELEPHONE (OUTPATIENT)
Dept: GASTROENTEROLOGY | Age: 44
End: 2024-03-01

## 2024-03-01 NOTE — TELEPHONE ENCOUNTER
Patient called to follow up H- Pylori, 40-20 PPI is covered by insurance   resubmit prescription?    Patient call was returned on 03/01/ 2024 @ 10:40 AM for clarification of     medication/ clarify pharmacy .    According to TE dated 2/15/24 Susan Grinnell sent Nexium 20 mg BID not sure of what procedure is referring to left voice message of HIDA scan.    No answer. I left voice message to return call to the office for follow- up.

## 2024-03-01 NOTE — TELEPHONE ENCOUNTER
Patient returned call for clarification on TE dated on 03/01/24.   Last seen ov dated 02/06/2024 by Melissa Grinnell PA    Patent reports that esomeprazole( Nexium ) 20 mg  was last sent to pharmacy on 02/21/24   [facility] pharmacy [address] [phone]    Medication not approved by her insurance. I called the pharmacy to clarify.  Pharmacist states Rx not approved will need alternate medication that her insurance accepts.     Pt states she can not take other alternate medication. She has tried them before please send prior authorization  She may be reached @ [phone] to be advised.    Forward enclosed message to Sarah Lackey CMA to start Prior Auth or to advise.

## 2024-03-05 LAB — VIT B12 SERPL-MCNC: 322 PG/ML (ref 232–1245)

## 2024-03-11 PROBLEM — K82.8 BILIARY DYSKINESIA: Status: ACTIVE | Noted: 2024-03-11

## 2024-03-11 PROBLEM — R10.11 RUQ PAIN: Status: ACTIVE | Noted: 2024-03-11

## 2024-03-11 PROBLEM — K81.1 CHRONIC CHOLECYSTITIS: Status: ACTIVE | Noted: 2024-03-11

## 2024-03-11 LAB — VIT B12 SERPL-MCNC: 322 PG/ML (ref 232–1245)

## 2024-03-15 ENCOUNTER — TELEPHONE (OUTPATIENT)
Age: 44
End: 2024-03-15

## 2024-03-21 ENCOUNTER — OFFICE VISIT (OUTPATIENT)
Age: 44
End: 2024-03-21
Payer: COMMERCIAL

## 2024-03-21 ENCOUNTER — PREP FOR PROCEDURE (OUTPATIENT)
Dept: GASTROENTEROLOGY | Age: 44
End: 2024-03-21

## 2024-03-21 VITALS
DIASTOLIC BLOOD PRESSURE: 84 MMHG | HEIGHT: 70 IN | HEART RATE: 81 BPM | TEMPERATURE: 97.6 F | BODY MASS INDEX: 19.04 KG/M2 | WEIGHT: 133 LBS | SYSTOLIC BLOOD PRESSURE: 113 MMHG | OXYGEN SATURATION: 97 %

## 2024-03-21 DIAGNOSIS — R13.19 ESOPHAGEAL DYSPHAGIA: ICD-10-CM

## 2024-03-21 DIAGNOSIS — K21.9 GASTROESOPHAGEAL REFLUX DISEASE, UNSPECIFIED WHETHER ESOPHAGITIS PRESENT: Primary | ICD-10-CM

## 2024-03-21 PROCEDURE — 99214 OFFICE O/P EST MOD 30 MIN: CPT | Performed by: PHYSICIAN ASSISTANT

## 2024-03-21 RX ORDER — SODIUM CHLORIDE 0.9 % (FLUSH) 0.9 %
5-40 SYRINGE (ML) INJECTION PRN
Status: CANCELLED | OUTPATIENT
Start: 2024-03-21

## 2024-03-21 RX ORDER — SODIUM CHLORIDE 0.9 % (FLUSH) 0.9 %
5-40 SYRINGE (ML) INJECTION EVERY 12 HOURS SCHEDULED
Status: CANCELLED | OUTPATIENT
Start: 2024-03-21

## 2024-03-21 RX ORDER — SODIUM CHLORIDE 9 MG/ML
25 INJECTION, SOLUTION INTRAVENOUS PRN
Status: CANCELLED | OUTPATIENT
Start: 2024-03-21

## 2024-03-21 RX ORDER — OMEPRAZOLE 40 MG/1
40 CAPSULE, DELAYED RELEASE ORAL
Qty: 180 CAPSULE | Refills: 0 | Status: SHIPPED | OUTPATIENT
Start: 2024-03-21

## 2024-03-21 RX ORDER — FAMOTIDINE 40 MG/1
40 TABLET, FILM COATED ORAL EVERY EVENING
Qty: 90 TABLET | Refills: 0 | Status: SHIPPED | OUTPATIENT
Start: 2024-03-21

## 2024-03-21 NOTE — PATIENT INSTRUCTIONS
For reflux:   Switch to Prilosec 40 mg twice daily 30 minutes before meals.  Add Pepcid 40 mg every evening before bed.   Use Gaviscon as needed for breakthrough reflux symptoms.    Eat smaller and more frequent meals. Avoid lying down for 3 hours after eating. Elevate the head of the bed by 6 inches. Avoid wearing tight-fitting clothing. Stop smoking and avoid alcohol. Avoid NSAID medications (Aspirin, Excedrin, Aleve, Ibuprofen, Goody Powder, Toradol, Mobic, Voltaren, etc). Consider weight loss to get to a healthy weight, which is often critical to eliminating symptoms of reflux. Avoid foods and acid-containing beverages that can exacerbate the symptoms of reflux. This includes:     -Caffeine  -Chocolate  -Peppermint  -Alcohol (especially red wine)  -Carbonated beverages  -Citrus fruits  -Tomato-based products  -Vinegar  -Spicy and greasy foods

## 2024-03-21 NOTE — PROGRESS NOTES
Problems Mother     High Blood Pressure Father     Diabetes Father     Stroke Maternal Grandfather     Colon Cancer Paternal Grandmother        Current Outpatient Medications   Medication Sig Dispense Refill    omeprazole (PRILOSEC) 40 MG delayed release capsule Take 1 capsule by mouth 2 times daily (before meals) 180 capsule 0    famotidine (PEPCID) 40 MG tablet Take 1 tablet by mouth every evening 90 tablet 0    esomeprazole (NEXIUM) 20 MG delayed release capsule Take 1 capsule by mouth 2 times daily (before meals) 60 capsule 2    anastrozole (ARIMIDEX) 1 MG tablet Take 1 tablet by mouth daily 30 tablet 3    loratadine (CLARITIN) 10 MG tablet Take 1 tablet by mouth daily PRN       No current facility-administered medications for this visit.       Review of Systems  All other systems reviewed and are negative except as noted above.          Objective     Vitals:    03/21/24 0939   BP: 113/84   Pulse: 81   Temp: 97.6 °F (36.4 °C)   SpO2: 97%       Physical Exam  Vitals reviewed.   Constitutional:       General: She is not in acute distress.     Appearance: Normal appearance. She is normal weight. She is not ill-appearing, toxic-appearing or diaphoretic.   Eyes:      General: No scleral icterus.  Cardiovascular:      Rate and Rhythm: Normal rate and regular rhythm.      Heart sounds: No murmur heard.  Pulmonary:      Effort: Pulmonary effort is normal. No respiratory distress.      Breath sounds: Normal breath sounds.   Abdominal:      General: There is no distension.      Palpations: Abdomen is soft.      Tenderness: There is abdominal tenderness (mild discomfort epigastric region). There is no guarding or rebound.   Skin:     General: Skin is warm and dry.      Coloration: Skin is not jaundiced.   Neurological:      General: No focal deficit present.      Mental Status: She is alert and oriented to person, place, and time.   Psychiatric:         Mood and Affect: Mood normal.         Behavior: Behavior normal.

## 2024-03-26 ENCOUNTER — OFFICE VISIT (OUTPATIENT)
Dept: ENDOCRINOLOGY | Age: 44
End: 2024-03-26
Payer: COMMERCIAL

## 2024-03-26 VITALS
DIASTOLIC BLOOD PRESSURE: 78 MMHG | WEIGHT: 133 LBS | HEART RATE: 70 BPM | BODY MASS INDEX: 19.08 KG/M2 | SYSTOLIC BLOOD PRESSURE: 110 MMHG

## 2024-03-26 DIAGNOSIS — T38.6X5A OSTEOPOROSIS DUE TO AROMATASE INHIBITOR: ICD-10-CM

## 2024-03-26 DIAGNOSIS — R63.4 WEIGHT LOSS: ICD-10-CM

## 2024-03-26 DIAGNOSIS — E55.9 VITAMIN D DEFICIENCY: ICD-10-CM

## 2024-03-26 DIAGNOSIS — T38.6X5A OSTEOPOROSIS DUE TO AROMATASE INHIBITOR: Primary | ICD-10-CM

## 2024-03-26 DIAGNOSIS — M81.8 OSTEOPOROSIS DUE TO AROMATASE INHIBITOR: ICD-10-CM

## 2024-03-26 DIAGNOSIS — M81.8 OSTEOPOROSIS DUE TO AROMATASE INHIBITOR: Primary | ICD-10-CM

## 2024-03-26 LAB
25(OH)D3 SERPL-MCNC: 25 NG/ML (ref 30–100)
ALBUMIN SERPL-MCNC: 4.3 G/DL (ref 3.5–5)
ALBUMIN/GLOB SERPL: 1.2 (ref 0.4–1.6)
ALP SERPL-CCNC: 89 U/L (ref 50–136)
ALT SERPL-CCNC: 24 U/L (ref 12–65)
ANION GAP SERPL CALC-SCNC: 4 MMOL/L (ref 2–11)
AST SERPL-CCNC: 17 U/L (ref 15–37)
BILIRUB SERPL-MCNC: 0.6 MG/DL (ref 0.2–1.1)
BUN SERPL-MCNC: 12 MG/DL (ref 6–23)
CALCIUM SERPL-MCNC: 10.3 MG/DL (ref 8.3–10.4)
CHLORIDE SERPL-SCNC: 106 MMOL/L (ref 103–113)
CO2 SERPL-SCNC: 28 MMOL/L (ref 21–32)
CREAT SERPL-MCNC: 0.9 MG/DL (ref 0.6–1)
GLOBULIN SER CALC-MCNC: 3.6 G/DL (ref 2.8–4.5)
GLUCOSE SERPL-MCNC: 93 MG/DL (ref 65–100)
PHOSPHATE SERPL-MCNC: 3.5 MG/DL (ref 2.5–4.5)
POTASSIUM SERPL-SCNC: 4 MMOL/L (ref 3.5–5.1)
PROT SERPL-MCNC: 7.9 G/DL (ref 6.3–8.2)
SODIUM SERPL-SCNC: 138 MMOL/L (ref 136–146)
TSH W FREE THYROID IF ABNORMAL: 1.32 UIU/ML (ref 0.36–3.74)

## 2024-03-26 PROCEDURE — 99204 OFFICE O/P NEW MOD 45 MIN: CPT | Performed by: INTERNAL MEDICINE

## 2024-03-26 RX ORDER — ALENDRONATE SODIUM 70 MG/1
70 TABLET ORAL
Qty: 12 TABLET | Refills: 3 | Status: SHIPPED | OUTPATIENT
Start: 2024-03-26

## 2024-03-26 ASSESSMENT — ENCOUNTER SYMPTOMS
VOMITING: 0
DIARRHEA: 0
CONSTIPATION: 0
TROUBLE SWALLOWING: 1
NAUSEA: 0

## 2024-03-26 NOTE — PERIOP NOTE
Patient verified name, , and procedure.    Type: 1a; abbreviated assessment per anesthesia guidelines  Labs per surgeon: none  Labs per anesthesia: none indicated      Instructed pt that they will be notified by the Gi Lab for time of arrival. If any questions please call the GI lab at [phone].    Follow diet and prep instructions per office.   Bath or shower the night before and the am of surgery with antibacterial soap. No lotions, oils, powders, cologne on skin. No make up, eye make up or jewelry. Wear loose fitting comfortable, clean clothing.     Must have adult present in building the entire time .     Medications for the day of procedure omeprazole, anastrozole     Hold: vitamins and supplements    The following discharge instructions reviewed with patient: medication given during procedure may cause drowsiness for several hours, therefore, do not drive or operate machinery for remainder of the day, no alcohol on the day of your procedure, resume regular diet and activity unless otherwise directed, for mild sore throat you may use Cepacol throat lozenges or warm salt water gargles as needed, call your physician for any problems or questions. Patient verbalizes understanding.

## 2024-03-26 NOTE — PROGRESS NOTES
Eduin Aburto MD, FACE  [facility]  [address] [place] [address], [facility] [address]  [place] [address]  Phone [phone]  Facsimile [phone]          Sandra Iqbal is a 43 y.o. female seen 3/26/2024 at the request of EVELYN Davis for the evaluation of osteoporosis        ASSESSMENT AND PLAN:    1. Osteoporosis due to aromatase inhibitor  Bone density 10/2023 revealed osteoporosis with probable significant bone loss compared to the prior study in 3/2022.  Her risk factors include aromatase inhibitor use, estrogen deficiency and long-term proton pump inhibitor use.  We discussed the various treatment options today.  I think she would benefit from starting an oral bisphosphonate such as alendronate.  She was instructed on the proper way to take oral bisphosphonates.  We also discussed the rare side effects of osteonecrosis of the jaw and atypical femoral shaft fractures.  She does have a history of significant GERD and I instructed her to let me know if the alendronate exacerbates her symptoms.  If she does not tolerate oral bisphosphonates, then I will likely switch her to intravenous Reclast.  Follow-up in 6 months.  I will plan on repeating bone density in 12 to 24 months.    - alendronate (FOSAMAX) 70 MG tablet; Take 1 tablet by mouth every 7 days  Dispense: 12 tablet; Refill: 3    2. Vitamin D deficiency  I will assess her vitamin D level today.    - cholecalciferol (VITAMIN D3) 10 MCG/ML (400 unit/mL) LIQD oral liquid; Take 4 mLs by mouth daily  Dispense: 1 mL; Refill: 0    3. Weight loss  I will check her thyroid function tests today.      Follow-up and Dispositions    Return in about 6 months (around 9/26/2024).         HISTORY OF PRESENT ILLNESS:    METABOLIC BONE DISEASE    Presentation/Diagnosis: She has a history of osteopenia diagnosed in 3/2022, which progressed to osteoporosis.    Risk Factors: She has a history of breast cancer diagnosed in 2021.  She was

## 2024-03-27 ENCOUNTER — TELEPHONE (OUTPATIENT)
Dept: ENDOCRINOLOGY | Age: 44
End: 2024-03-27

## 2024-03-27 ENCOUNTER — ANESTHESIA EVENT (OUTPATIENT)
Dept: ENDOSCOPY | Age: 44
End: 2024-03-27
Payer: COMMERCIAL

## 2024-03-27 DIAGNOSIS — E55.9 VITAMIN D DEFICIENCY: ICD-10-CM

## 2024-03-27 LAB
CALCIUM SERPL-MCNC: 10.4 MG/DL (ref 8.3–10.4)
PTH-INTACT SERPL-MCNC: 96.8 PG/ML (ref 18.5–88)

## 2024-03-27 RX ORDER — NALOXONE HYDROCHLORIDE 0.4 MG/ML
INJECTION, SOLUTION INTRAMUSCULAR; INTRAVENOUS; SUBCUTANEOUS PRN
Status: CANCELLED | OUTPATIENT
Start: 2024-03-27

## 2024-03-27 NOTE — PROGRESS NOTES
Patient contacted to verify procedure, doctor, NPO orders, and place/time of arrival (0715 arrival). Contact made with patient. No s/sx of COVID. Medications and history reviewed in chart by this RN, no concerns noted.

## 2024-03-27 NOTE — TELEPHONE ENCOUNTER
Her vitamin D level was low.  I would recommend that she increase her vitamin D3 to 4000 units daily.

## 2024-03-28 ENCOUNTER — HOSPITAL ENCOUNTER (OUTPATIENT)
Age: 44
Setting detail: OUTPATIENT SURGERY
Discharge: HOME | End: 2024-03-28
Attending: STUDENT IN AN ORGANIZED HEALTH CARE EDUCATION/TRAINING PROGRAM | Admitting: STUDENT IN AN ORGANIZED HEALTH CARE EDUCATION/TRAINING PROGRAM
Payer: COMMERCIAL

## 2024-03-28 ENCOUNTER — ANESTHESIA (OUTPATIENT)
Dept: ENDOSCOPY | Age: 44
End: 2024-03-28
Payer: COMMERCIAL

## 2024-03-28 ENCOUNTER — TELEPHONE (OUTPATIENT)
Dept: GASTROENTEROLOGY | Age: 44
End: 2024-03-28

## 2024-03-28 VITALS
WEIGHT: 135 LBS | RESPIRATION RATE: 20 BRPM | SYSTOLIC BLOOD PRESSURE: 136 MMHG | HEART RATE: 69 BPM | HEIGHT: 70 IN | BODY MASS INDEX: 19.33 KG/M2 | TEMPERATURE: 97 F | DIASTOLIC BLOOD PRESSURE: 81 MMHG | OXYGEN SATURATION: 100 %

## 2024-03-28 PROCEDURE — 7100000011 HC PHASE II RECOVERY - ADDTL 15 MIN: Performed by: STUDENT IN AN ORGANIZED HEALTH CARE EDUCATION/TRAINING PROGRAM

## 2024-03-28 PROCEDURE — 7100000010 HC PHASE II RECOVERY - FIRST 15 MIN: Performed by: STUDENT IN AN ORGANIZED HEALTH CARE EDUCATION/TRAINING PROGRAM

## 2024-03-28 PROCEDURE — C1769 GUIDE WIRE: HCPCS | Performed by: STUDENT IN AN ORGANIZED HEALTH CARE EDUCATION/TRAINING PROGRAM

## 2024-03-28 PROCEDURE — 6360000002 HC RX W HCPCS

## 2024-03-28 PROCEDURE — 88305 TISSUE EXAM BY PATHOLOGIST: CPT

## 2024-03-28 PROCEDURE — 2709999900 HC NON-CHARGEABLE SUPPLY: Performed by: STUDENT IN AN ORGANIZED HEALTH CARE EDUCATION/TRAINING PROGRAM

## 2024-03-28 PROCEDURE — 2580000003 HC RX 258: Performed by: ANESTHESIOLOGY

## 2024-03-28 PROCEDURE — 2580000003 HC RX 258

## 2024-03-28 PROCEDURE — 3609012700 HC EGD DILATION SAVORY: Performed by: STUDENT IN AN ORGANIZED HEALTH CARE EDUCATION/TRAINING PROGRAM

## 2024-03-28 PROCEDURE — 3700000000 HC ANESTHESIA ATTENDED CARE: Performed by: STUDENT IN AN ORGANIZED HEALTH CARE EDUCATION/TRAINING PROGRAM

## 2024-03-28 RX ORDER — PROPOFOL 10 MG/ML
INJECTION, EMULSION INTRAVENOUS CONTINUOUS PRN
Status: DISCONTINUED | OUTPATIENT
Start: 2024-03-28 | End: 2024-03-28 | Stop reason: SDUPTHER

## 2024-03-28 RX ORDER — SODIUM CHLORIDE 0.9 % (FLUSH) 0.9 %
5-40 SYRINGE (ML) INJECTION EVERY 12 HOURS SCHEDULED
Status: DISCONTINUED | OUTPATIENT
Start: 2024-03-28 | End: 2024-03-28 | Stop reason: HOSPADM

## 2024-03-28 RX ORDER — LIDOCAINE HYDROCHLORIDE 10 MG/ML
1 INJECTION, SOLUTION INFILTRATION; PERINEURAL
Status: DISCONTINUED | OUTPATIENT
Start: 2024-03-28 | End: 2024-03-28

## 2024-03-28 RX ORDER — SODIUM CHLORIDE 9 MG/ML
25 INJECTION, SOLUTION INTRAVENOUS PRN
Status: DISCONTINUED | OUTPATIENT
Start: 2024-03-28 | End: 2024-03-28 | Stop reason: HOSPADM

## 2024-03-28 RX ORDER — SODIUM CHLORIDE, SODIUM LACTATE, POTASSIUM CHLORIDE, CALCIUM CHLORIDE 600; 310; 30; 20 MG/100ML; MG/100ML; MG/100ML; MG/100ML
INJECTION, SOLUTION INTRAVENOUS CONTINUOUS PRN
Status: DISCONTINUED | OUTPATIENT
Start: 2024-03-28 | End: 2024-03-28 | Stop reason: SDUPTHER

## 2024-03-28 RX ORDER — SODIUM CHLORIDE 0.9 % (FLUSH) 0.9 %
5-40 SYRINGE (ML) INJECTION PRN
Status: DISCONTINUED | OUTPATIENT
Start: 2024-03-28 | End: 2024-03-28 | Stop reason: HOSPADM

## 2024-03-28 RX ORDER — SODIUM CHLORIDE, SODIUM LACTATE, POTASSIUM CHLORIDE, CALCIUM CHLORIDE 600; 310; 30; 20 MG/100ML; MG/100ML; MG/100ML; MG/100ML
INJECTION, SOLUTION INTRAVENOUS CONTINUOUS
Status: DISCONTINUED | OUTPATIENT
Start: 2024-03-28 | End: 2024-03-28 | Stop reason: HOSPADM

## 2024-03-28 RX ORDER — SODIUM CHLORIDE 9 MG/ML
INJECTION, SOLUTION INTRAVENOUS PRN
Status: DISCONTINUED | OUTPATIENT
Start: 2024-03-28 | End: 2024-03-28 | Stop reason: HOSPADM

## 2024-03-28 RX ADMIN — PROPOFOL 180 MCG/KG/MIN: 10 INJECTION, EMULSION INTRAVENOUS at 09:14

## 2024-03-28 RX ADMIN — SODIUM CHLORIDE, SODIUM LACTATE, POTASSIUM CHLORIDE, AND CALCIUM CHLORIDE: 600; 310; 30; 20 INJECTION, SOLUTION INTRAVENOUS at 09:10

## 2024-03-28 RX ADMIN — SODIUM CHLORIDE, POTASSIUM CHLORIDE, SODIUM LACTATE AND CALCIUM CHLORIDE: 600; 310; 30; 20 INJECTION, SOLUTION INTRAVENOUS at 08:48

## 2024-03-28 ASSESSMENT — PAIN - FUNCTIONAL ASSESSMENT: PAIN_FUNCTIONAL_ASSESSMENT: 0-10

## 2024-03-28 ASSESSMENT — PAIN DESCRIPTION - DESCRIPTORS: DESCRIPTORS: NAGGING

## 2024-03-28 NOTE — ANESTHESIA POSTPROCEDURE EVALUATION
Department of Anesthesiology  Postprocedure Note    Patient: Sandra Iqbal  MRN: [id number]  YOB: 1980  Date of evaluation: 3/28/2024    Procedure Summary       Date: 03/28/24 Room / Location: [place] [address] / [facility] ENDOSCOPY    Anesthesia Start: 0909 Anesthesia Stop: 0929    Procedure: ESOPHAGOGASTRODUODENOSCOPY DILATION SAVORY/ BXS (Upper GI Region) Diagnosis:       Esophageal dysphagia      Gastroesophageal reflux disease, unspecified whether esophagitis present      (Esophageal dysphagia [R13.19])      (Gastroesophageal reflux disease, unspecified whether esophagitis present [K21.9])    Surgeons: Kailey Walker MD Responsible Provider: Giovanny Medrano DO    Anesthesia Type: TIVA ASA Status: 2            Anesthesia Type: No value filed.    Adeola Phase I: Adeola Score: 10    Adeola Phase II: Adeola Score: 8    Anesthesia Post Evaluation    Patient location during evaluation: PACU  Patient participation: complete - patient participated  Level of consciousness: awake and alert  Airway patency: patent  Nausea & Vomiting: no nausea and no vomiting  Cardiovascular status: hemodynamically stable  Respiratory status: acceptable  Hydration status: euvolemic  Multimodal analgesia pain management approach  Pain management: adequate and satisfactory to patient    No notable events documented.

## 2024-03-28 NOTE — TELEPHONE ENCOUNTER
Called and LVM informing pt to return call to scheduled 4 week follow up visit with PA, Melissa Grinnell per Dr. Walker's op note.

## 2024-03-28 NOTE — DISCHARGE INSTRUCTIONS
Gastrointestinal Esophagogastroduodenoscopy (EGD) - Upper Exam Discharge Instructions    1. Call Dr. Walker at [phone] for any problems or questions.    2. Contact the doctor's office for follow up appointment as directed.    3. Medication may cause drowsiness for several hours, therefore:  Do not drive or operate machinery for remainder of the day.    No alcohol today.  Do not make any important or legal decisions for 24 hours.  Do not sign any legal documents for 24 hours.    5. Ordinarily, you may resume regular diet and activity after exam unless otherwise specified by your physician.    6. For mild soreness in your throat you may use Cepacol throat lozenges or warm salt-water gargles as needed.    Any additional instructions:  Continue using Prilosec twice daily along with Pepcid.  Follow-up in the clinic with Susan in 4 weeks.  Await for biopsy results, you will receive a pathology letter within 2 weeks.           Esophageal Dilation: What to Expect at Home  Your Recovery  You had an esophageal dilation. This procedure can open up narrow areas of the esophagus.  After the procedure, you will stay at the hospital or surgery center for 1 to 2 hours. This will allow the medicine to wear off. You will be able to go home after your doctor or nurse checks to make sure that you're not having any problems.  This care sheet gives you a general idea about how long it will take for you to recover. But each person recovers at a different pace. Follow the steps below to get better as quickly as possible.  How can you care for yourself at home?  Activity    Rest as much as you need to after you go home.     You should be able to go back to your usual activities the day after the procedure.   Diet    Follow your doctor's directions for eating after the procedure.     Drink plenty of fluids (unless your doctor has told you not to).   Medicines    Your doctor will tell you if and when you can restart your medicines. Prateek  or she will also give you instructions about taking any new medicines.     If you stopped taking aspirin or some other blood thinner, your doctor will tell you when to start taking it again.     If you have a sore throat the day after the procedure, use an over-the-counter spray to numb your throat. Sucking on throat lozenges and gargling with warm salt water may also help relieve your symptoms.   Follow-up care is a key part of your treatment and safety. Be sure to make and go to all appointments, and call your doctor if you are having problems. It's also a good idea to know your test results and keep a list of the medicines you take.  When should you call for help?   Call 911 anytime you think you may need emergency care. For example, call if:    You passed out (lost consciousness).     You have trouble breathing.     Your stools are maroon or very bloody   Call your doctor now or seek immediate medical care if:    You have new or worse belly pain.     You have a fever.     You have new or more blood in your stools.     You are sick to your stomach and cannot drink fluids.     You cannot pass stools or gas.     You have pain that does not get better after you take pain medicine.   Watch closely for changes in your health, and be sure to contact your doctor if:    Your throat still hurts after a day or two.     You do not get better as expected.   Where can you learn more?  Go to https://[email or web address]/patientEd and enter J014 to learn more about \"Esophageal Dilation: What to Expect at Home.\"  Current as of: October 19, 2023               Content Version: 14.0  © 2001-7383 [employer].   Care instructions adapted under license by [employer]. If you have questions about a medical condition or this instruction, always ask your healthcare professional. [employer] disclaims any warranty or liability for your use of this information.

## 2024-03-28 NOTE — ANESTHESIA PRE PROCEDURE
Department of Anesthesiology  Preprocedure Note       Name:  Sandra Iqbal   Age:  43 y.o.  :  1980                                          MRN:  [id number]         Date:  3/28/2024      Surgeon: Surgeon(s):  Kailey Walker MD    Procedure: Procedure(s):  ESOPHAGOGASTRODUODENOSCOPY    Medications prior to admission:   Prior to Admission medications    Medication Sig Start Date End Date Taking? Authorizing Provider   cholecalciferol (VITAMIN D3) 10 MCG/ML (400 unit/mL) LIQD oral liquid Take 10 mLs by mouth daily 3/27/24   Eduin Aburto MD   alendronate (FOSAMAX) 70 MG tablet Take 1 tablet by mouth every 7 days 3/26/24   Eduin Aburto MD   omeprazole (PRILOSEC) 40 MG delayed release capsule Take 1 capsule by mouth 2 times daily (before meals) 3/21/24   Grinnell, Melissa R, PA-C   famotidine (PEPCID) 40 MG tablet Take 1 tablet by mouth every evening 3/21/24   Grinnell, Melissa R, PA-C   anastrozole (ARIMIDEX) 1 MG tablet Take 1 tablet by mouth daily 24   Delbert Jerome MD       Current medications:    Current Facility-Administered Medications   Medication Dose Route Frequency Provider Last Rate Last Admin   • lidocaine 1 % injection 1 mL  1 mL IntraDERmal Once PRN Giovanny Medrano DO       • lactated ringers IV soln infusion   IntraVENous Continuous Giovanny Medrano  mL/hr at 24 0848 New Bag at 24 0848   • sodium chloride flush 0.9 % injection 5-40 mL  5-40 mL IntraVENous 2 times per day Giovanny Medrano DO       • sodium chloride flush 0.9 % injection 5-40 mL  5-40 mL IntraVENous PRN Giovanny Medrano DO       • 0.9 % sodium chloride infusion   IntraVENous PRN Giovanny Medrano DO       • sodium chloride flush 0.9 % injection 5-40 mL  5-40 mL IntraVENous 2 times per day Kailey Walker MD       • sodium chloride flush 0.9 % injection 5-40 mL  5-40 mL IntraVENous PRN Kailey Walker MD       • 0.9 % sodium chloride infusion  25 mL IntraVENous PRN Ertem,

## 2024-03-28 NOTE — OP NOTE
Endoscopy Note          Operative Report    Patient: Sandra Iqbal MRN: [id number]      YOB: 1980  Age: 43 y.o.  Sex: female            Indications:  Dysphagia    Preoperative Evaluation: The patient was evaluated prior to the procedure in the GI lab admission area, the patient ASA was recorded .  Consent was obtained from the patient with the risk of perforation bleeding and aspiration.    Anesthesia: MARINA-per anesthesia  Complications: None  EBL: Unremarkable  Procedure: The patient was sedated in the left lateral decubitus position. Scope was advance from the mouth to the third portion of the duodenum. The scope was withdrawn to the stomach and a refroflexed view was performed.     Findings:  Normal-appearing esophagus except small hiatal hernia with irregular Z-line, biopsies obtained for Mckay's esophagus.  Empiric dilation was done with savory 51 Czech, no mucosal rent on postdilation exam.  Normal-appearing stomach and duodenum.    Postoperative Diagnosis:  Hiatal hernia    Recommendations:   Continue using Prilosec twice daily along with Pepcid.  Follow-up in the clinic with Susan in 4 weeks.  Await for biopsy results, you will receive a pathology letter within 2 weeks.     Signed By:  Kailey Walker MD     March 28, 2024

## 2024-03-28 NOTE — H&P
jaundiced.   Neurological:      General: No focal deficit present.      Mental Status: She is alert and oriented to person, place, and time.   Psychiatric:         Mood and Affect: Mood normal.         Behavior: Behavior normal.         Thought Content: Thought content normal.         Judgment: Judgment normal.               Return for scheduled EGD, follow-up visit 1-2 weeks after procedure.

## 2024-04-09 ENCOUNTER — TELEPHONE (OUTPATIENT)
Dept: GASTROENTEROLOGY | Age: 44
End: 2024-04-09

## 2024-04-09 NOTE — TELEPHONE ENCOUNTER
Patient called in several times with medication questions but states she has not received a response. Please RTC to patient to discuss her concerns.OP notes from Dr Walker states patient needs to be schedule for a 4 week follow up with PA please forward to Roni to schedule after. Thanks

## 2024-04-29 ENCOUNTER — OFFICE VISIT (OUTPATIENT)
Age: 44
End: 2024-04-29
Payer: COMMERCIAL

## 2024-04-29 VITALS
HEART RATE: 83 BPM | HEIGHT: 70 IN | RESPIRATION RATE: 17 BRPM | SYSTOLIC BLOOD PRESSURE: 111 MMHG | BODY MASS INDEX: 19.04 KG/M2 | WEIGHT: 133 LBS | DIASTOLIC BLOOD PRESSURE: 79 MMHG

## 2024-04-29 DIAGNOSIS — K44.9 HIATAL HERNIA: Primary | ICD-10-CM

## 2024-04-29 DIAGNOSIS — K21.9 GASTROESOPHAGEAL REFLUX DISEASE WITHOUT ESOPHAGITIS: ICD-10-CM

## 2024-04-29 PROCEDURE — 99213 OFFICE O/P EST LOW 20 MIN: CPT | Performed by: PHYSICIAN ASSISTANT

## 2024-04-29 NOTE — PROGRESS NOTES
Pulse: 83   Resp: 17       Physical Exam  Vitals reviewed.   Constitutional:       General: She is not in acute distress.     Appearance: Normal appearance. She is normal weight. She is not ill-appearing, toxic-appearing or diaphoretic.   Eyes:      General: No scleral icterus.  Cardiovascular:      Rate and Rhythm: Normal rate.   Pulmonary:      Effort: Pulmonary effort is normal. No respiratory distress.   Skin:     Coloration: Skin is not jaundiced.   Neurological:      General: No focal deficit present.      Mental Status: She is alert and oriented to person, place, and time.   Psychiatric:         Mood and Affect: Mood normal.         Behavior: Behavior normal.         Thought Content: Thought content normal.         Judgment: Judgment normal.              Return in about 3 months (around 7/29/2024), or if symptoms worsen or fail to improve.            An electronic signature was used to authenticate this note.    --Melissa R Grinnell, PA-C

## 2024-05-14 ENCOUNTER — TELEPHONE (OUTPATIENT)
Dept: GASTROENTEROLOGY | Age: 44
End: 2024-05-14

## 2024-05-14 DIAGNOSIS — K21.9 GASTROESOPHAGEAL REFLUX DISEASE, UNSPECIFIED WHETHER ESOPHAGITIS PRESENT: Primary | ICD-10-CM

## 2024-05-14 RX ORDER — OMEPRAZOLE 40 MG/1
40 CAPSULE, DELAYED RELEASE ORAL
Qty: 120 CAPSULE | Refills: 0 | Status: SHIPPED | OUTPATIENT
Start: 2024-05-14

## 2024-05-14 NOTE — TELEPHONE ENCOUNTER
Returned call to pt to clarify request. Per pt, pharmacy only gave 90 capsules of omeprazole on 3/21 (half of total 180 quantity ordered) when original rx sent in by Melissa Grinnell, PA. Per pt, pharmacist told pt they can't release the next 90 capsules to pt for another 15 days. Per OV note from Melissa Grinnell, PA, pt to still be finishing 90 day course of omeprazole BID at this time. Pt requesting new prescription as she is out of current supply. Omeprazole rx re-ordered for 60 day supply for pt to finish 90 day course as instructed by JEREMY Davis. Instructed pt to reach back out with any questions or if she has any trouble picking up medications.

## 2024-05-14 NOTE — TELEPHONE ENCOUNTER
Patient states pharmacy only  gave her a 90 days supplies of her 180 day RX of Omeprazole nee a new RX for the rest

## 2024-05-15 ENCOUNTER — TELEPHONE (OUTPATIENT)
Dept: ENDOCRINOLOGY | Age: 44
End: 2024-05-15

## 2024-05-15 NOTE — TELEPHONE ENCOUNTER
Vitamin D supplementation should not cause fatigue, weakness, muscle pain or swelling.  Vitamin D deficiency can cause fatigue, weakness and muscle pain.

## 2024-05-15 NOTE — TELEPHONE ENCOUNTER
Pt called clinic stating that her [facility] pharmacy notified her that her insurance denied the new omeprazole 40mg BID rx sent in on 5/14. Informed pt clinic would look into it and update her accordingly.     Called pt's [facility] pharmacy. Per pharmacist, pt's insurance denied twice daily dosing and is requiring a Prior Authorization for approval.     [email or web address] message sent to pt updating her that clinic will work on PA request for the omeprazole as ordered by Melissa Grinnell, PA and then reach back out with updates throughout process.

## 2024-05-15 NOTE — TELEPHONE ENCOUNTER
Patient requests Arimidex to be refilled to the [facility].  She also requests a prescription for the compression sleeve.

## 2024-05-15 NOTE — TELEPHONE ENCOUNTER
Medication Requested: arimidex      Date last office visit: 2/6/24    Date last prescribed: 1/22/24    Requested Pharmacy: [facility] [address]    Action Taken: chart reviewed    Patient returned call. States she has been paying for her compression sleeves, but she would like to submit to insurance

## 2024-05-15 NOTE — TELEPHONE ENCOUNTER
Pt called saying she is wondering if she can hold off on the vitamin D. She would like to hold on it due to feeling fatigued and weak especially in the morning. And having swelling at the ankles with muscle pain.

## 2024-05-16 ENCOUNTER — NURSE ONLY (OUTPATIENT)
Dept: FAMILY MEDICINE CLINIC | Facility: CLINIC | Age: 44
End: 2024-05-16
Payer: COMMERCIAL

## 2024-05-16 DIAGNOSIS — Z00.00 LABORATORY EXAM ORDERED AS PART OF ROUTINE GENERAL MEDICAL EXAMINATION: Primary | ICD-10-CM

## 2024-05-16 DIAGNOSIS — Z11.59 NEED FOR HEPATITIS C SCREENING TEST: ICD-10-CM

## 2024-05-16 LAB
ALBUMIN SERPL-MCNC: 4.1 G/DL (ref 3.5–5)
ALBUMIN/GLOB SERPL: 1.4 (ref 1–1.9)
ALP SERPL-CCNC: 88 U/L (ref 35–104)
ALT SERPL-CCNC: 16 U/L (ref 12–65)
ANION GAP SERPL CALC-SCNC: 8 MMOL/L (ref 9–18)
AST SERPL-CCNC: 26 U/L (ref 15–37)
BILIRUB SERPL-MCNC: 0.5 MG/DL (ref 0–1.2)
BUN SERPL-MCNC: 10 MG/DL (ref 6–23)
CALCIUM SERPL-MCNC: 9.5 MG/DL (ref 8.8–10.2)
CHLORIDE SERPL-SCNC: 106 MMOL/L (ref 98–107)
CHOLEST SERPL-MCNC: 199 MG/DL (ref 0–200)
CO2 SERPL-SCNC: 26 MMOL/L (ref 20–28)
CREAT SERPL-MCNC: 0.78 MG/DL (ref 0.6–1.1)
GLOBULIN SER CALC-MCNC: 2.9 G/DL (ref 2.3–3.5)
GLUCOSE SERPL-MCNC: 100 MG/DL (ref 70–99)
GRANS ABSOLUTE, POC: 3 K/UL
GRANULOCYTES %, POC: 55.4 %
HCV AB SER QL: NONREACTIVE
HDLC SERPL-MCNC: 66 MG/DL (ref 40–60)
HDLC SERPL: 3 (ref 0–5)
HEMATOCRIT, POC: 43 %
HEMOGLOBIN, POC: 14.3 G/DL
LDLC SERPL CALC-MCNC: 118 MG/DL (ref 0–100)
LYMPHOCYTE %, POC: 37.7 %
LYMPHS ABSOLUTE, POC: 2 K/UL
MCH, POC: NORMAL PG (ref 40–?)
MCHC, POC: 33.3
MCV, POC: 88.6
MONOCYTE %, POC: 6.9 %
MONOCYTE, ABSOLUTE POC: 0.4 K/UL
MPV, POC: 7.7 FL
PLATELET COUNT, POC: 227 K/UL
POTASSIUM SERPL-SCNC: 4.2 MMOL/L (ref 3.5–5.1)
PROT SERPL-MCNC: 7 G/DL (ref 6.3–8.2)
RBC, POC: 4.85 M/UL
RDW, POC: 12.7 %
SODIUM SERPL-SCNC: 140 MMOL/L (ref 136–145)
TRIGL SERPL-MCNC: 78 MG/DL (ref 0–150)
TSH, 3RD GENERATION: 1.93 UIU/ML (ref 0.27–4.2)
VLDLC SERPL CALC-MCNC: 16 MG/DL (ref 6–23)
WBC, POC: 5.4 K/UL

## 2024-05-16 PROCEDURE — 85025 COMPLETE CBC W/AUTO DIFF WBC: CPT | Performed by: FAMILY MEDICINE

## 2024-05-16 RX ORDER — ANASTROZOLE 1 MG/1
1 TABLET ORAL DAILY
Qty: 30 TABLET | Refills: 6 | Status: SHIPPED | OUTPATIENT
Start: 2024-05-16

## 2024-05-16 NOTE — TELEPHONE ENCOUNTER
I spoke to the Pt she expressed understanding. The Pt is wondering with her medications is there a certain time she should take things for it would help how she is feeling?

## 2024-05-16 NOTE — TELEPHONE ENCOUNTER
Vitamin D can be taken whenever.  Alendronate should be taken once a week in the morning, on an empty stomach, with a full glass of water, at least 1 hour prior to eating, drinking or taking other medications.

## 2024-05-22 ENCOUNTER — TELEPHONE (OUTPATIENT)
Age: 44
End: 2024-05-22

## 2024-05-22 NOTE — TELEPHONE ENCOUNTER
Pt called clinic inquiring about status of Prior Authorization request for omeprazole. Informed pt that KELIN Hoffman had spoken with pt on 5/15 informing her that medication was approved and that pt should be able to  rx at pharmacy now with no issues.     Pt states she stopped by pharmacy yesterday and was told by pharmacy that they \"didn't have it.\" Pt states that she was speaking with a new employee at the pharmacy and thinks there may have been confusion on pharmacy end, so pt states she will call pharmacy back or stop by again in attempt to  omeprazole or get pharmacy to fill it. Instructed pt to reach back out with any further issues. Pt agreeable.

## 2024-05-23 ENCOUNTER — OFFICE VISIT (OUTPATIENT)
Dept: FAMILY MEDICINE CLINIC | Facility: CLINIC | Age: 44
End: 2024-05-23
Payer: COMMERCIAL

## 2024-05-23 VITALS
HEIGHT: 70 IN | DIASTOLIC BLOOD PRESSURE: 78 MMHG | SYSTOLIC BLOOD PRESSURE: 114 MMHG | WEIGHT: 134 LBS | HEART RATE: 62 BPM | BODY MASS INDEX: 19.18 KG/M2

## 2024-05-23 DIAGNOSIS — K82.8 BILIARY DYSKINESIA: ICD-10-CM

## 2024-05-23 DIAGNOSIS — Z85.3 HISTORY OF BREAST CANCER: ICD-10-CM

## 2024-05-23 DIAGNOSIS — K21.9 GERD WITHOUT ESOPHAGITIS: ICD-10-CM

## 2024-05-23 DIAGNOSIS — T38.6X5A OSTEOPOROSIS DUE TO AROMATASE INHIBITOR: ICD-10-CM

## 2024-05-23 DIAGNOSIS — G60.9 IDIOPATHIC NEUROPATHY: ICD-10-CM

## 2024-05-23 DIAGNOSIS — G47.09 OTHER INSOMNIA: ICD-10-CM

## 2024-05-23 DIAGNOSIS — R53.83 FATIGUE, UNSPECIFIED TYPE: ICD-10-CM

## 2024-05-23 DIAGNOSIS — Z00.00 ROUTINE GENERAL MEDICAL EXAMINATION AT A HEALTH CARE FACILITY: Primary | ICD-10-CM

## 2024-05-23 DIAGNOSIS — M81.8 OSTEOPOROSIS DUE TO AROMATASE INHIBITOR: ICD-10-CM

## 2024-05-23 PROCEDURE — 99396 PREV VISIT EST AGE 40-64: CPT | Performed by: FAMILY MEDICINE

## 2024-05-23 SDOH — ECONOMIC STABILITY: HOUSING INSECURITY
IN THE LAST 12 MONTHS, WAS THERE A TIME WHEN YOU DID NOT HAVE A STEADY PLACE TO SLEEP OR SLEPT IN A SHELTER (INCLUDING NOW)?: NO

## 2024-05-23 SDOH — ECONOMIC STABILITY: FOOD INSECURITY: WITHIN THE PAST 12 MONTHS, YOU WORRIED THAT YOUR FOOD WOULD RUN OUT BEFORE YOU GOT MONEY TO BUY MORE.: NEVER TRUE

## 2024-05-23 SDOH — ECONOMIC STABILITY: FOOD INSECURITY: WITHIN THE PAST 12 MONTHS, THE FOOD YOU BOUGHT JUST DIDN'T LAST AND YOU DIDN'T HAVE MONEY TO GET MORE.: NEVER TRUE

## 2024-05-23 SDOH — ECONOMIC STABILITY: INCOME INSECURITY: HOW HARD IS IT FOR YOU TO PAY FOR THE VERY BASICS LIKE FOOD, HOUSING, MEDICAL CARE, AND HEATING?: NOT HARD AT ALL

## 2024-05-23 NOTE — PROGRESS NOTES
Is being monitored by oncology.  Continue Fosamax and calcium supplementation.    7.  Biliary dyskinesia.  Patient is considering elective cholecystectomy.    8.  Insomnia.  As above.  Trial of melatonin with L-theanine.    Elements of this note have been dictated using speech recognition software. As a result, errors of speech recognition may have occurred.

## 2024-06-28 RX ORDER — FAMOTIDINE 40 MG/1
40 TABLET, FILM COATED ORAL EVERY EVENING
Qty: 90 TABLET | Refills: 1 | Status: SHIPPED | OUTPATIENT
Start: 2024-06-28

## 2024-07-01 RX ORDER — FAMOTIDINE 40 MG/1
40 TABLET, FILM COATED ORAL EVERY EVENING
Qty: 90 TABLET | Refills: 0 | OUTPATIENT
Start: 2024-07-01

## 2024-07-12 RX ORDER — ANASTROZOLE 1 MG/1
1 TABLET ORAL DAILY
Qty: 30 TABLET | Refills: 6 | OUTPATIENT
Start: 2024-07-12

## 2024-07-25 DIAGNOSIS — Z17.0 MALIGNANT NEOPLASM OF CENTRAL PORTION OF RIGHT BREAST IN FEMALE, ESTROGEN RECEPTOR POSITIVE (HCC): Primary | ICD-10-CM

## 2024-07-25 DIAGNOSIS — C50.111 MALIGNANT NEOPLASM OF CENTRAL PORTION OF RIGHT BREAST IN FEMALE, ESTROGEN RECEPTOR POSITIVE (HCC): Primary | ICD-10-CM

## 2024-08-02 ENCOUNTER — OFFICE VISIT (OUTPATIENT)
Dept: ONCOLOGY | Age: 44
End: 2024-08-02
Payer: COMMERCIAL

## 2024-08-02 ENCOUNTER — HOSPITAL ENCOUNTER (OUTPATIENT)
Dept: LAB | Age: 44
Discharge: HOME | End: 2024-08-02
Payer: COMMERCIAL

## 2024-08-02 ENCOUNTER — TELEPHONE (OUTPATIENT)
Dept: ONCOLOGY | Age: 44
End: 2024-08-02

## 2024-08-02 VITALS
SYSTOLIC BLOOD PRESSURE: 125 MMHG | OXYGEN SATURATION: 97 % | WEIGHT: 137.8 LBS | DIASTOLIC BLOOD PRESSURE: 87 MMHG | RESPIRATION RATE: 16 BRPM | BODY MASS INDEX: 19.73 KG/M2 | TEMPERATURE: 98.1 F | HEIGHT: 70 IN | HEART RATE: 108 BPM

## 2024-08-02 DIAGNOSIS — Z17.0 MALIGNANT NEOPLASM OF CENTRAL PORTION OF RIGHT BREAST IN FEMALE, ESTROGEN RECEPTOR POSITIVE (HCC): Primary | ICD-10-CM

## 2024-08-02 DIAGNOSIS — Z17.0 MALIGNANT NEOPLASM OF CENTRAL PORTION OF RIGHT BREAST IN FEMALE, ESTROGEN RECEPTOR POSITIVE (HCC): ICD-10-CM

## 2024-08-02 DIAGNOSIS — T38.6X5A OSTEOPOROSIS DUE TO AROMATASE INHIBITOR: ICD-10-CM

## 2024-08-02 DIAGNOSIS — M81.8 OSTEOPOROSIS DUE TO AROMATASE INHIBITOR: ICD-10-CM

## 2024-08-02 DIAGNOSIS — C50.111 MALIGNANT NEOPLASM OF CENTRAL PORTION OF RIGHT BREAST IN FEMALE, ESTROGEN RECEPTOR POSITIVE (HCC): Primary | ICD-10-CM

## 2024-08-02 DIAGNOSIS — C50.111 MALIGNANT NEOPLASM OF CENTRAL PORTION OF RIGHT BREAST IN FEMALE, ESTROGEN RECEPTOR POSITIVE (HCC): ICD-10-CM

## 2024-08-02 LAB
ALBUMIN SERPL-MCNC: 4.1 G/DL (ref 3.5–5)
ALBUMIN/GLOB SERPL: 1.3 (ref 1–1.9)
ALP SERPL-CCNC: 87 U/L (ref 35–104)
ALT SERPL-CCNC: 17 U/L (ref 12–65)
ANION GAP SERPL CALC-SCNC: 12 MMOL/L (ref 9–18)
AST SERPL-CCNC: 20 U/L (ref 15–37)
BASOPHILS # BLD: 0 K/UL (ref 0–0.2)
BASOPHILS NFR BLD: 1 % (ref 0–2)
BILIRUB SERPL-MCNC: 0.4 MG/DL (ref 0–1.2)
BUN SERPL-MCNC: 12 MG/DL (ref 6–23)
CALCIUM SERPL-MCNC: 10 MG/DL (ref 8.8–10.2)
CHLORIDE SERPL-SCNC: 106 MMOL/L (ref 98–107)
CO2 SERPL-SCNC: 25 MMOL/L (ref 20–28)
CREAT SERPL-MCNC: 0.72 MG/DL (ref 0.6–1.1)
DIFFERENTIAL METHOD BLD: ABNORMAL
EOSINOPHIL # BLD: 0.1 K/UL (ref 0–0.8)
EOSINOPHIL NFR BLD: 2 % (ref 0.5–7.8)
ERYTHROCYTE [DISTWIDTH] IN BLOOD BY AUTOMATED COUNT: 12.2 % (ref 11.9–14.6)
GLOBULIN SER CALC-MCNC: 3.2 G/DL (ref 2.3–3.5)
GLUCOSE SERPL-MCNC: 105 MG/DL (ref 70–99)
HCT VFR BLD AUTO: 44.6 % (ref 35.8–46.3)
HGB BLD-MCNC: 15.3 G/DL (ref 11.7–15.4)
IMM GRANULOCYTES # BLD AUTO: 0 K/UL (ref 0–0.5)
IMM GRANULOCYTES NFR BLD AUTO: 0 % (ref 0–5)
LYMPHOCYTES # BLD: 1.1 K/UL (ref 0.5–4.6)
LYMPHOCYTES NFR BLD: 24 % (ref 13–44)
MCH RBC QN AUTO: 29.3 PG (ref 26.1–32.9)
MCHC RBC AUTO-ENTMCNC: 34.3 G/DL (ref 31.4–35)
MCV RBC AUTO: 85.3 FL (ref 82–102)
MONOCYTES # BLD: 0.4 K/UL (ref 0.1–1.3)
MONOCYTES NFR BLD: 10 % (ref 4–12)
NEUTS SEG # BLD: 2.8 K/UL (ref 1.7–8.2)
NEUTS SEG NFR BLD: 63 % (ref 43–78)
NRBC # BLD: 0 K/UL (ref 0–0.2)
PLATELET # BLD AUTO: 182 K/UL (ref 150–450)
PMV BLD AUTO: 9.6 FL (ref 9.4–12.3)
POTASSIUM SERPL-SCNC: 3.7 MMOL/L (ref 3.5–5.1)
PROT SERPL-MCNC: 7.3 G/DL (ref 6.3–8.2)
RBC # BLD AUTO: 5.23 M/UL (ref 4.05–5.2)
SODIUM SERPL-SCNC: 143 MMOL/L (ref 136–145)
WBC # BLD AUTO: 4.4 K/UL (ref 4.3–11.1)

## 2024-08-02 PROCEDURE — 99214 OFFICE O/P EST MOD 30 MIN: CPT | Performed by: INTERNAL MEDICINE

## 2024-08-02 PROCEDURE — 85025 COMPLETE CBC W/AUTO DIFF WBC: CPT

## 2024-08-02 PROCEDURE — 80053 COMPREHEN METABOLIC PANEL: CPT

## 2024-08-02 PROCEDURE — 36415 COLL VENOUS BLD VENIPUNCTURE: CPT

## 2024-08-02 RX ORDER — ANASTROZOLE 1 MG/1
1 TABLET ORAL DAILY
Qty: 30 TABLET | Refills: 11 | Status: SHIPPED | OUTPATIENT
Start: 2024-08-02

## 2024-08-02 ASSESSMENT — PATIENT HEALTH QUESTIONNAIRE - PHQ9
SUM OF ALL RESPONSES TO PHQ QUESTIONS 1-9: 0
SUM OF ALL RESPONSES TO PHQ QUESTIONS 1-9: 0
1. LITTLE INTEREST OR PLEASURE IN DOING THINGS: NOT AT ALL
SUM OF ALL RESPONSES TO PHQ QUESTIONS 1-9: 0
2. FEELING DOWN, DEPRESSED OR HOPELESS: NOT AT ALL
SUM OF ALL RESPONSES TO PHQ9 QUESTIONS 1 & 2: 0
SUM OF ALL RESPONSES TO PHQ QUESTIONS 1-9: 0

## 2024-08-02 NOTE — PATIENT INSTRUCTIONS
Patient Information from Today's Visit    The members of your [facility] are listed below:    Physician Provider: Delbert Jerome, Medical Oncologist  Advanced Practice Clinician: Rika Rea NP  Registered Nurse: Hua VEGA RN  Navigator: Maude WOOD RN and Zenaida GAGNON RN  Medical Assistant: Miracle YADAV MA  : Karyn MOTLEY   Supportive Care Services: Yoselyn LESLIE LMSW    Diagnosis: Breast      Follow Up Instructions:   -Labs reviewed  - Next DEXA scan due in October of 2025    Follow up in 6 months with labs       Treatment Summary has been discussed and given to patient:No      Current Labs:   Hospital Outpatient Visit on 08/02/2024   Component Date Value Ref Range Status    WBC 08/02/2024 4.4  4.3 - 11.1 K/uL Final    RBC 08/02/2024 5.23 (H)  4.05 - 5.2 M/uL Final    Hemoglobin 08/02/2024 15.3  11.7 - 15.4 g/dL Final    Hematocrit 08/02/2024 44.6  35.8 - 46.3 % Final    MCV 08/02/2024 85.3  82.0 - 102.0 FL Final    MCH 08/02/2024 29.3  26.1 - 32.9 PG Final    MCHC 08/02/2024 34.3  31.4 - 35.0 g/dL Final    RDW 08/02/2024 12.2  11.9 - 14.6 % Final    Platelets 08/02/2024 182  150 - 450 K/uL Final    MPV 08/02/2024 9.6  9.4 - 12.3 FL Final    nRBC 08/02/2024 0.00  0.0 - 0.2 K/uL Final    **Note: Absolute NRBC parameter is now reported with Hemogram**    Neutrophils % 08/02/2024 63  43 - 78 % Final    Lymphocytes % 08/02/2024 24  13 - 44 % Final    Monocytes % 08/02/2024 10  4.0 - 12.0 % Final    Eosinophils % 08/02/2024 2  0.5 - 7.8 % Final    Basophils % 08/02/2024 1  0.0 - 2.0 % Final    Immature Granulocytes % 08/02/2024 0  0.0 - 5.0 % Final    Neutrophils Absolute 08/02/2024 2.8  1.7 - 8.2 K/UL Final    Lymphocytes Absolute 08/02/2024 1.1  0.5 - 4.6 K/UL Final    Monocytes Absolute 08/02/2024 0.4  0.1 - 1.3 K/UL Final    Eosinophils Absolute 08/02/2024 0.1  0.0 - 0.8 K/UL Final    Basophils Absolute 08/02/2024 0.0  0.0 - 0.2 K/UL Final    Immature Granulocytes Absolute 08/02/2024 0.0  0.0 - 0.5 K/UL

## 2024-08-02 NOTE — PROGRESS NOTES
[facility] Hematology and Oncology: Office Visit Established Patient    Chief Complaint:    Chief Complaint   Patient presents with    Follow-up       History of Present Illness:  Ms. Iqbal is a 44 y.o. female who presents today for evaluation regarding breast cancer.  In the spring of 2021, she developed a palpable abnormality and pain in her left breast. Bilateral digital diagnostic mammogram 3D/2D and targeted right ultrasound on 4/1/2021 identified an abnormal calcification in the central and mid upper portion of the right breast.  A hypoechoic lesion was seen on ultrasound corresponding to the dominant collection of calcifications seen on mammogram at the 12 o'clock position areolar margin.  Since the calcifications appeared to extend over a larger area than the ultrasound finding, stereotactic biopsy was recommended for further evaluation. This was performed on 4/6/21 with pathology revealing DCIS, ER 93%/SD 17% positive.  Bilateral MRI of breasts with CAD was obtained on 4/13/2021 demonstrating extensive suspicious discontiguous clustered ring enhancement involving the right UOQ extending to the midline and just below the nipple laterally mimicking the distribution of the calcifications noted mammographically and consistent with the known DCIS; 1.4 cm separate area of clustered ring enhancement in the right axillary tail, suspicious for additional disease; and left breast with no significant enhancing lesions.  She was evaluated in consultation at \[facility]"" breast [facility] on 4/15/22. Genetic counseling and testing with 84 gene panel sent on 4/15/2021 was negative for pathogenic mutation. She ultimately decided to proceed with bilateral mastectomy with immediate reconstruction and right sentinel node biopsy which was performed on 5/10/21. Pathology from the left breast showed atypical ductal hyperplasia. Pathology from the right breast showed ER 93%/PR13%/HER2 positive by FISH, poorly differentiated multifocal

## 2024-08-02 NOTE — TELEPHONE ENCOUNTER
Pt came in looking for a prescription of a compression sleeve. Informed her we do not have a written prescription for that. She is requesting a written prescription for the compression sleeve and to be called when it is brought to the desk.

## 2024-08-05 DIAGNOSIS — I89.0 LYMPHEDEMA: ICD-10-CM

## 2024-08-05 DIAGNOSIS — Z17.0 MALIGNANT NEOPLASM OF CENTRAL PORTION OF RIGHT BREAST IN FEMALE, ESTROGEN RECEPTOR POSITIVE (HCC): Primary | ICD-10-CM

## 2024-08-05 DIAGNOSIS — C50.111 MALIGNANT NEOPLASM OF CENTRAL PORTION OF RIGHT BREAST IN FEMALE, ESTROGEN RECEPTOR POSITIVE (HCC): Primary | ICD-10-CM

## 2024-08-05 DIAGNOSIS — I97.2 POST-MASTECTOMY LYMPHEDEMA SYNDROME: ICD-10-CM

## 2024-08-05 DIAGNOSIS — Z90.13 HX OF BILATERAL MASTECTOMY: ICD-10-CM

## 2024-08-05 NOTE — TELEPHONE ENCOUNTER
RN returned patient's call and LVM letting her know that prescription for compression sleeves has been routed to Dr. Jerome for signature. Prescription will be ready to  at  on second floor of our office anytime starting tomorrow afternoon. RN left callback number if patient has any questions for concerns.

## 2024-08-19 ENCOUNTER — PROCEDURE VISIT (OUTPATIENT)
Dept: NEUROLOGY | Age: 44
End: 2024-08-19
Payer: COMMERCIAL

## 2024-08-19 VITALS — BODY MASS INDEX: 19.9 KG/M2 | HEIGHT: 70 IN | WEIGHT: 139 LBS | HEART RATE: 78 BPM | OXYGEN SATURATION: 98 %

## 2024-08-19 DIAGNOSIS — R20.2 PARESTHESIA OF BOTH HANDS: Primary | ICD-10-CM

## 2024-08-19 PROCEDURE — 95885 MUSC TST DONE W/NERV TST LIM: CPT | Performed by: PSYCHIATRY & NEUROLOGY

## 2024-08-19 PROCEDURE — 95913 NRV CNDJ TEST 13/> STUDIES: CPT | Performed by: PSYCHIATRY & NEUROLOGY

## 2024-08-19 NOTE — PROGRESS NOTES
[place] [address]  Phone:  [phone]  Fax:   [phone]          + + +   Glossary:   MUP: motor unit potential;  SNAP: sensory nerve action potential; Fibs:  Fibrillations;  Fascic: fasciculations; IA: insertional activity;  IP: interference pattern;  SCV :  Sensory conduction velocity;  MCV: motor conduction velocity; NOTE:: muscles are abbreviated latin initials.     [place] raw datafile::   * filed at Procedure or Media Files. *

## 2024-10-07 ENCOUNTER — OFFICE VISIT (OUTPATIENT)
Dept: ENDOCRINOLOGY | Age: 44
End: 2024-10-07
Payer: COMMERCIAL

## 2024-10-07 VITALS
HEIGHT: 70 IN | RESPIRATION RATE: 16 BRPM | HEART RATE: 80 BPM | SYSTOLIC BLOOD PRESSURE: 108 MMHG | OXYGEN SATURATION: 98 % | WEIGHT: 139 LBS | DIASTOLIC BLOOD PRESSURE: 72 MMHG | BODY MASS INDEX: 19.9 KG/M2

## 2024-10-07 DIAGNOSIS — M81.8 OSTEOPOROSIS DUE TO AROMATASE INHIBITOR: Primary | ICD-10-CM

## 2024-10-07 DIAGNOSIS — T38.6X5A OSTEOPOROSIS DUE TO AROMATASE INHIBITOR: Primary | ICD-10-CM

## 2024-10-07 DIAGNOSIS — E55.9 VITAMIN D DEFICIENCY: ICD-10-CM

## 2024-10-07 PROCEDURE — 99214 OFFICE O/P EST MOD 30 MIN: CPT | Performed by: INTERNAL MEDICINE

## 2024-10-07 RX ORDER — ERGOCALCIFEROL 1.25 MG/1
50000 CAPSULE, LIQUID FILLED ORAL WEEKLY
COMMUNITY
End: 2024-10-07 | Stop reason: SDUPTHER

## 2024-10-07 RX ORDER — ALENDRONATE SODIUM 70 MG/1
70 TABLET ORAL
Qty: 12 TABLET | Refills: 3 | Status: SHIPPED | OUTPATIENT
Start: 2024-10-07

## 2024-10-07 RX ORDER — ERGOCALCIFEROL 1.25 MG/1
50000 CAPSULE, LIQUID FILLED ORAL WEEKLY
Qty: 12 CAPSULE | Refills: 3 | Status: SHIPPED | OUTPATIENT
Start: 2024-10-07

## 2024-10-07 ASSESSMENT — ENCOUNTER SYMPTOMS
DIARRHEA: 1
CONSTIPATION: 1
TROUBLE SWALLOWING: 0

## 2024-10-07 NOTE — PROGRESS NOTES
Denies tobacco, alcohol use.  She took dexamethasone during chemotherapy but otherwise she denies the use of glucocorticoids.    Fracture History: No fragility fractures.    Treatment: She has been taking vitamin D3 liquid 4000 units daily.  Her PCP prescribed ergocalciferol 50,000 units weekly but she has not started it yet.  Alendronate started late 3/2024; she reports myalgias and arthralgias for a couple days after taking it.  No GI side effects.    Contraindications to Treatment: No history of skeletal radiation or blood clots.  She has a history of significant GERD and takes omeprazole and Pepcid.    Imaging:  DXA 3/11/2022 ([facility]): The lowest bone mineral density result is obtained from the right femoral neck: 0.597, T-score -2.3.  DXA 10/11/2023 ([place]): L1-L4 T-score -1.3; left total hip T-score -2.6; right total hip T-score -2.8; minimal density at the femoral necks is 0.690, T-score -2.5.    Labs:  7/14/2022: 25-OH vitamin D 27.2.  10/30/2023: Calcium 9.4, albumin 4.2, creatinine 0.80 (GFR >60) alkaline phosphatase 101.  2/6/2024: Estradiol <11.80.  3/26/2024: Calcium 10.3, phosphorus 3.5, creatinine 0.90 (GFR 80), PTH 96.8, 25-OH vitamin D 25.0, TSH 1.32.  5/16/2024: TSH 1.930.  8/2/2024: Calcium 10.0, creatinine 0.72 (GFR >90).  9/4/2024: Calcium 9.8, creatinine 0.82 (GFR 89), 25-OH vitamin D 26.2, TSH 1.590, free T4 1.27, free T3 3.1, total T3 125, thyroid peroxidase antibodies 22, thyroglobulin antibody <1.0.      Review of Systems   Constitutional:  Positive for fatigue.        Weight increased 6 pounds since last visit.   HENT:  Negative for trouble swallowing.    Gastrointestinal:  Positive for constipation and diarrhea.   Endocrine:        She reports hot flashes.   Psychiatric/Behavioral:  Positive for sleep disturbance.        Vital Signs:  /72 (Site: Left Upper Arm, Position: Sitting, Cuff Size: Medium Adult)   Pulse 80   Resp 16   Ht 1.778 m (5' 10\")   Wt 63 kg (139 lb)  Detail Level: Detailed Depth Of Biopsy: dermis Was A Bandage Applied: Yes Size Of Lesion In Cm: 0 Biopsy Type: H and E Biopsy Method: Dermablade Anesthesia Type: 1% lidocaine with epinephrine Anesthesia Volume In Cc: 0.5 Hemostasis: Aluminum Chloride and Electrocautery Wound Care: Petrolatum Dressing: bandage Destruction After The Procedure: No Type Of Destruction Used: Curettage Curettage Text: The wound bed was treated with curettage after the biopsy was performed. Cryotherapy Text: The wound bed was treated with cryotherapy after the biopsy was performed. Electrodesiccation Text: The wound bed was treated with electrodesiccation after the biopsy was performed. Electrodesiccation And Curettage Text: The wound bed was treated with electrodesiccation and curettage after the biopsy was performed. Silver Nitrate Text: The wound bed was treated with silver nitrate after the biopsy was performed. Lab: 6 Lab Facility: 3 Triangulation (Location Of Lesion In Relation To Distance From Anatomical Landmarks): 20cm superior and 5cm L lateral from sacrum Consent: Verbal consent was obtained and risks were reviewed including but not limited to scarring, infection, bleeding, scabbing, incomplete removal, nerve damage and allergy to anesthesia. Post-Care Instructions: I reviewed with the patient in detail post-care instructions. Patient is to keep the biopsy site dry overnight, and then apply bacitracin twice daily until healed. Patient may apply hydrogen peroxide soaks to remove any crusting. Notification Instructions: Patient will be notified of biopsy results. However, patient instructed to call the office if not contacted within 2 weeks. Billing Type: Third-Party Bill Information: Selecting Yes will display possible errors in your note based on the variables you have selected. This validation is only offered as a suggestion for you. PLEASE NOTE THAT THE VALIDATION TEXT WILL BE REMOVED WHEN YOU FINALIZE YOUR NOTE. IF YOU WANT TO FAX A PRELIMINARY NOTE YOU WILL NEED TO TOGGLE THIS TO 'NO' IF YOU DO NOT WANT IT IN YOUR FAXED NOTE.

## 2024-12-04 ENCOUNTER — APPOINTMENT (OUTPATIENT)
Dept: URBAN - METROPOLITAN AREA CLINIC 329 | Facility: CLINIC | Age: 44
Setting detail: DERMATOLOGY
End: 2024-12-04

## 2024-12-04 DIAGNOSIS — L81.4 OTHER MELANIN HYPERPIGMENTATION: ICD-10-CM

## 2024-12-04 DIAGNOSIS — D22 MELANOCYTIC NEVI: ICD-10-CM

## 2024-12-04 DIAGNOSIS — L57.8 OTHER SKIN CHANGES DUE TO CHRONIC EXPOSURE TO NONIONIZING RADIATION: ICD-10-CM

## 2024-12-04 DIAGNOSIS — D18.0 HEMANGIOMA: ICD-10-CM

## 2024-12-04 DIAGNOSIS — L57.0 ACTINIC KERATOSIS: ICD-10-CM

## 2024-12-04 DIAGNOSIS — L82.1 OTHER SEBORRHEIC KERATOSIS: ICD-10-CM

## 2024-12-04 PROBLEM — D22.71 MELANOCYTIC NEVI OF RIGHT LOWER LIMB, INCLUDING HIP: Status: ACTIVE | Noted: 2024-12-04

## 2024-12-04 PROBLEM — D18.01 HEMANGIOMA OF SKIN AND SUBCUTANEOUS TISSUE: Status: ACTIVE | Noted: 2024-12-04

## 2024-12-04 PROBLEM — D22.39 MELANOCYTIC NEVI OF OTHER PARTS OF FACE: Status: ACTIVE | Noted: 2024-12-04

## 2024-12-04 PROBLEM — D22.5 MELANOCYTIC NEVI OF TRUNK: Status: ACTIVE | Noted: 2024-12-04

## 2024-12-04 PROBLEM — D22.72 MELANOCYTIC NEVI OF LEFT LOWER LIMB, INCLUDING HIP: Status: ACTIVE | Noted: 2024-12-04

## 2024-12-04 PROBLEM — D22.61 MELANOCYTIC NEVI OF RIGHT UPPER LIMB, INCLUDING SHOULDER: Status: ACTIVE | Noted: 2024-12-04

## 2024-12-04 PROCEDURE — ? TREATMENT REGIMEN

## 2024-12-04 PROCEDURE — ? DEFER

## 2024-12-04 PROCEDURE — ? LIQUID NITROGEN

## 2024-12-04 PROCEDURE — 99213 OFFICE O/P EST LOW 20 MIN: CPT | Mod: 25

## 2024-12-04 PROCEDURE — ? ADDITIONAL NOTES

## 2024-12-04 PROCEDURE — 17000 DESTRUCT PREMALG LESION: CPT

## 2024-12-04 PROCEDURE — ? COUNSELING

## 2024-12-04 ASSESSMENT — LOCATION DETAILED DESCRIPTION DERM
LOCATION DETAILED: RIGHT LATERAL MALAR CHEEK
LOCATION DETAILED: RIGHT NASAL SIDEWALL
LOCATION DETAILED: LEFT ANTERIOR DISTAL UPPER ARM
LOCATION DETAILED: RIGHT CLAVICULAR SKIN
LOCATION DETAILED: LEFT CENTRAL MALAR CHEEK
LOCATION DETAILED: RIGHT SUPERIOR MEDIAL UPPER BACK
LOCATION DETAILED: LEFT DISTAL CALF
LOCATION DETAILED: LEFT PROXIMAL POSTERIOR THIGH
LOCATION DETAILED: LEFT DISTAL POSTERIOR THIGH
LOCATION DETAILED: RIGHT PROXIMAL DORSAL FOREARM
LOCATION DETAILED: RIGHT VENTRAL DISTAL FOREARM
LOCATION DETAILED: LEFT INFERIOR UPPER BACK
LOCATION DETAILED: EPIGASTRIC SKIN
LOCATION DETAILED: LEFT INFERIOR CENTRAL MALAR CHEEK
LOCATION DETAILED: RIGHT NASAL ALAR GROOVE
LOCATION DETAILED: RIGHT SUPERIOR LATERAL BUCCAL CHEEK
LOCATION DETAILED: UPPER STERNUM
LOCATION DETAILED: RIGHT ANTERIOR DISTAL UPPER ARM
LOCATION DETAILED: RIGHT ANTERIOR DISTAL THIGH
LOCATION DETAILED: SUPERIOR THORACIC SPINE
LOCATION DETAILED: MID-FRONTAL SCALP

## 2024-12-04 ASSESSMENT — LOCATION SIMPLE DESCRIPTION DERM
LOCATION SIMPLE: LEFT POSTERIOR THIGH
LOCATION SIMPLE: RIGHT UPPER ARM
LOCATION SIMPLE: LEFT UPPER BACK
LOCATION SIMPLE: RIGHT THIGH
LOCATION SIMPLE: ANTERIOR SCALP
LOCATION SIMPLE: LEFT CHEEK
LOCATION SIMPLE: RIGHT FOREARM
LOCATION SIMPLE: LEFT CALF
LOCATION SIMPLE: RIGHT CHEEK
LOCATION SIMPLE: RIGHT NOSE
LOCATION SIMPLE: RIGHT CLAVICULAR SKIN
LOCATION SIMPLE: UPPER BACK
LOCATION SIMPLE: CHEST
LOCATION SIMPLE: RIGHT UPPER BACK
LOCATION SIMPLE: ABDOMEN
LOCATION SIMPLE: LEFT UPPER ARM

## 2024-12-04 ASSESSMENT — LOCATION ZONE DERM
LOCATION ZONE: ARM
LOCATION ZONE: NOSE
LOCATION ZONE: FACE
LOCATION ZONE: TRUNK
LOCATION ZONE: SCALP
LOCATION ZONE: LEG

## 2024-12-04 NOTE — HPI: EVALUATION OF SKIN LESION(S)
Hpi Title: Evaluation of Skin Lesions
Additional History: The pt reports an itchy spot on the right side of her face. She also has a spot on the side on the right side of her nose. She states that the spot on her nose scales over and it will go away and come back.

## 2024-12-04 NOTE — PROCEDURE: DEFER
Detail Level: Detailed
X Size Of Lesion In Cm (Optional): 0
Introduction Text (Please End With A Colon): The following procedure was deferred: shave removal

## 2024-12-04 NOTE — PROCEDURE: ADDITIONAL NOTES
Detail Level: Zone
Render Risk Assessment In Note?: no
Additional Notes: The provider informed the patient that that a shave removal of the Nevi on the face may leave a dent, but the lesion on the side of her nose will be fine. The patient will schedule a separate visit for the removals.

## 2024-12-11 ENCOUNTER — TELEPHONE (OUTPATIENT)
Dept: ONCOLOGY | Age: 44
End: 2024-12-11

## 2024-12-11 NOTE — TELEPHONE ENCOUNTER
Medication Requested: anastrazole    Date last prescribed: August 2024    Requested Pharmacy: [facility] [address]    Action Taken: chart reviewed. Call to [facility] state they have prescription ready for pickup.  Call to patient to review. She states that they are wanting her to pay for this prescription out of pocket unless there is some paperwork for us that states this would be for vacation and she doesn't need to pay  Message to team to inquire about what we need to do

## 2024-12-11 NOTE — TELEPHONE ENCOUNTER
Call and spoke with Deana at [facility]. She states that patient would be able to  the anastrazole on 12/20 and insurance would cover. They tried putting in a vacation override but the earliest fill would still be the 20th. Deana next asked if patient was going out of town here in the [place]. If she is, she can have the prescription transferred to any [facility]  to fill it while she is gone.  Call back to patient to review options. No answer. Message left to call the office back

## 2024-12-11 NOTE — TELEPHONE ENCOUNTER
Physician provider: Dr. Jerome  Reason for today's call (Please detail here patients chief complaint): Rx request  Last office visit:na  Patient Callback Number: [phone]  Was callback number verified?: Yes  Preferred pharmacy (If refill request): [facility] [address]  Calls to office within the last 48 hours?:No    Patient notified that their information will be routed to the [facility] clinical triage team for review. Patient is advised that they will receive a phone call from the triage department. If symptom related and symptoms worsen before receiving a call back, the patient has been advised to proceed to the nearest ED.     Patient states she will not have enough medication to get through her vacation. anastrozole (ARIMIDEX) 1 MG tablet   JIGNA Kerr  [phone]

## 2025-01-27 ENCOUNTER — TELEPHONE (OUTPATIENT)
Dept: RADIATION ONCOLOGY | Age: 45
End: 2025-01-27

## 2025-01-27 NOTE — TELEPHONE ENCOUNTER
Physician provider: Dr. Jerome  Reason for today's call (Please detail here patients chief complaint): Pt need to change appointment times to early in the morning or late in the day.   Last office visit:08/02/24  Patient Callback Number: [phone]  Was callback number verified?: Yes  Preferred pharmacy (If refill request): na  Calls to office within the last 48 hours?:No    Patient notified that their information will be routed to the [facility] clinical triage team for review. Patient is advised that they will receive a phone call from the triage department. If symptom related and symptoms worsen before receiving a call back, the patient has been advised to proceed to the nearest ED.

## 2025-03-25 ENCOUNTER — HOSPITAL ENCOUNTER (OUTPATIENT)
Dept: LAB | Age: 45
Discharge: HOME | End: 2025-03-25
Payer: COMMERCIAL

## 2025-03-25 ENCOUNTER — OFFICE VISIT (OUTPATIENT)
Dept: ONCOLOGY | Age: 45
End: 2025-03-25
Payer: COMMERCIAL

## 2025-03-25 VITALS
OXYGEN SATURATION: 95 % | BODY MASS INDEX: 20.43 KG/M2 | TEMPERATURE: 98 F | WEIGHT: 142.7 LBS | RESPIRATION RATE: 17 BRPM | HEIGHT: 70 IN | SYSTOLIC BLOOD PRESSURE: 125 MMHG | HEART RATE: 78 BPM | DIASTOLIC BLOOD PRESSURE: 84 MMHG

## 2025-03-25 DIAGNOSIS — C50.111 MALIGNANT NEOPLASM OF CENTRAL PORTION OF RIGHT BREAST IN FEMALE, ESTROGEN RECEPTOR POSITIVE: ICD-10-CM

## 2025-03-25 DIAGNOSIS — E55.9 VITAMIN D DEFICIENCY: ICD-10-CM

## 2025-03-25 DIAGNOSIS — M81.8 OSTEOPOROSIS DUE TO AROMATASE INHIBITOR: ICD-10-CM

## 2025-03-25 DIAGNOSIS — Z17.0 MALIGNANT NEOPLASM OF CENTRAL PORTION OF RIGHT BREAST IN FEMALE, ESTROGEN RECEPTOR POSITIVE: Primary | ICD-10-CM

## 2025-03-25 DIAGNOSIS — T38.6X5A OSTEOPOROSIS DUE TO AROMATASE INHIBITOR: ICD-10-CM

## 2025-03-25 DIAGNOSIS — Z17.0 MALIGNANT NEOPLASM OF CENTRAL PORTION OF RIGHT BREAST IN FEMALE, ESTROGEN RECEPTOR POSITIVE: ICD-10-CM

## 2025-03-25 DIAGNOSIS — I97.2 POST-MASTECTOMY LYMPHEDEMA SYNDROME: ICD-10-CM

## 2025-03-25 DIAGNOSIS — C50.111 MALIGNANT NEOPLASM OF CENTRAL PORTION OF RIGHT BREAST IN FEMALE, ESTROGEN RECEPTOR POSITIVE: Primary | ICD-10-CM

## 2025-03-25 DIAGNOSIS — Z90.13 HX OF BILATERAL MASTECTOMY: ICD-10-CM

## 2025-03-25 LAB
25(OH)D3 SERPL-MCNC: 41.9 NG/ML (ref 30–100)
ALBUMIN SERPL-MCNC: 4.3 G/DL (ref 3.5–5)
ALBUMIN/GLOB SERPL: 1.3 (ref 1–1.9)
ALP SERPL-CCNC: 62 U/L (ref 35–104)
ALT SERPL-CCNC: 20 U/L (ref 8–45)
ANION GAP SERPL CALC-SCNC: 9 MMOL/L (ref 7–16)
AST SERPL-CCNC: 22 U/L (ref 15–37)
BASOPHILS # BLD: 0.03 K/UL (ref 0–0.2)
BASOPHILS NFR BLD: 0.6 % (ref 0–2)
BILIRUB SERPL-MCNC: 0.6 MG/DL (ref 0–1.2)
BUN SERPL-MCNC: 12 MG/DL (ref 6–23)
CALCIUM SERPL-MCNC: 10 MG/DL (ref 8.8–10.2)
CHLORIDE SERPL-SCNC: 105 MMOL/L (ref 98–107)
CO2 SERPL-SCNC: 24 MMOL/L (ref 20–29)
CREAT SERPL-MCNC: 0.84 MG/DL (ref 0.6–1.1)
DIFFERENTIAL METHOD BLD: NORMAL
EOSINOPHIL # BLD: 0.07 K/UL (ref 0–0.8)
EOSINOPHIL NFR BLD: 1.4 % (ref 0.5–7.8)
ERYTHROCYTE [DISTWIDTH] IN BLOOD BY AUTOMATED COUNT: 12 % (ref 11.9–14.6)
GLOBULIN SER CALC-MCNC: 3.2 G/DL (ref 2.3–3.5)
GLUCOSE SERPL-MCNC: 100 MG/DL (ref 70–99)
HCT VFR BLD AUTO: 44.7 % (ref 35.8–46.3)
HGB BLD-MCNC: 15 G/DL (ref 11.7–15.4)
IMM GRANULOCYTES # BLD AUTO: 0.01 K/UL (ref 0–0.5)
IMM GRANULOCYTES NFR BLD AUTO: 0.2 % (ref 0–5)
LYMPHOCYTES # BLD: 1.19 K/UL (ref 0.5–4.6)
LYMPHOCYTES NFR BLD: 24.3 % (ref 13–44)
MCH RBC QN AUTO: 29.5 PG (ref 26.1–32.9)
MCHC RBC AUTO-ENTMCNC: 33.6 G/DL (ref 31.4–35)
MCV RBC AUTO: 87.8 FL (ref 82–102)
MONOCYTES # BLD: 0.25 K/UL (ref 0.1–1.3)
MONOCYTES NFR BLD: 5.1 % (ref 4–12)
NEUTS SEG # BLD: 3.35 K/UL (ref 1.7–8.2)
NEUTS SEG NFR BLD: 68.4 % (ref 43–78)
NRBC # BLD: 0 K/UL (ref 0–0.2)
PLATELET # BLD AUTO: 200 K/UL (ref 150–450)
PMV BLD AUTO: 9.6 FL (ref 9.4–12.3)
POTASSIUM SERPL-SCNC: 4.2 MMOL/L (ref 3.5–5.1)
PROT SERPL-MCNC: 7.5 G/DL (ref 6.3–8.2)
RBC # BLD AUTO: 5.09 M/UL (ref 4.05–5.2)
SODIUM SERPL-SCNC: 138 MMOL/L (ref 136–145)
WBC # BLD AUTO: 4.9 K/UL (ref 4.3–11.1)

## 2025-03-25 PROCEDURE — 99214 OFFICE O/P EST MOD 30 MIN: CPT | Performed by: NURSE PRACTITIONER

## 2025-03-25 PROCEDURE — 80053 COMPREHEN METABOLIC PANEL: CPT

## 2025-03-25 PROCEDURE — 36415 COLL VENOUS BLD VENIPUNCTURE: CPT

## 2025-03-25 PROCEDURE — 85025 COMPLETE CBC W/AUTO DIFF WBC: CPT

## 2025-03-25 PROCEDURE — 82306 VITAMIN D 25 HYDROXY: CPT

## 2025-03-25 RX ORDER — ANASTROZOLE 1 MG/1
1 TABLET ORAL DAILY
Qty: 90 TABLET | Refills: 3 | Status: SHIPPED | OUTPATIENT
Start: 2025-03-25

## 2025-03-25 ASSESSMENT — PATIENT HEALTH QUESTIONNAIRE - PHQ9
2. FEELING DOWN, DEPRESSED OR HOPELESS: NOT AT ALL
SUM OF ALL RESPONSES TO PHQ QUESTIONS 1-9: 0
1. LITTLE INTEREST OR PLEASURE IN DOING THINGS: NOT AT ALL
SUM OF ALL RESPONSES TO PHQ QUESTIONS 1-9: 0

## 2025-03-25 NOTE — PROGRESS NOTES
[facility] Hematology and Oncology: Office Visit Established Patient    Chief Complaint:    Chief Complaint   Patient presents with    Follow-up       History of Present Illness:  Ms. Iqbal is a 44 y.o. female who presents today for evaluation regarding breast cancer.  In the spring of 2021, she developed a palpable abnormality and pain in her left breast. Bilateral digital diagnostic mammogram 3D/2D and targeted right ultrasound on 4/1/2021 identified an abnormal calcification in the central and mid upper portion of the right breast.  A hypoechoic lesion was seen on ultrasound corresponding to the dominant collection of calcifications seen on mammogram at the 12 o'clock position areolar margin.  Since the calcifications appeared to extend over a larger area than the ultrasound finding, stereotactic biopsy was recommended for further evaluation. This was performed on 4/6/21 with pathology revealing DCIS, ER 93%/OR 17% positive.  Bilateral MRI of breasts with CAD was obtained on 4/13/2021 demonstrating extensive suspicious discontiguous clustered ring enhancement involving the right UOQ extending to the midline and just below the nipple laterally mimicking the distribution of the calcifications noted mammographically and consistent with the known DCIS; 1.4 cm separate area of clustered ring enhancement in the right axillary tail, suspicious for additional disease; and left breast with no significant enhancing lesions.  She was evaluated in consultation at \[facility]\"" breast [facility] on 4/15/22. Genetic counseling and testing with 84 gene panel sent on 4/15/2021 was negative for pathogenic mutation. She ultimately decided to proceed with bilateral mastectomy with immediate reconstruction and right sentinel node biopsy which was performed on 5/10/21. Pathology from the left breast showed atypical ductal hyperplasia. Pathology from the right breast showed ER 93%/PR13%/HER2 positive by FISH, poorly differentiated multifocal 
Initial (On Arrival)

## 2025-08-19 ENCOUNTER — TELEPHONE (OUTPATIENT)
Dept: ONCOLOGY | Age: 45
End: 2025-08-19

## 2025-08-19 DIAGNOSIS — Z90.13 HX OF BILATERAL MASTECTOMY: ICD-10-CM

## 2025-08-19 DIAGNOSIS — I89.0 LYMPHEDEMA: ICD-10-CM

## 2025-08-19 DIAGNOSIS — Z17.0 MALIGNANT NEOPLASM OF CENTRAL PORTION OF RIGHT BREAST IN FEMALE, ESTROGEN RECEPTOR POSITIVE (HCC): Primary | ICD-10-CM

## 2025-08-19 DIAGNOSIS — C50.111 MALIGNANT NEOPLASM OF CENTRAL PORTION OF RIGHT BREAST IN FEMALE, ESTROGEN RECEPTOR POSITIVE (HCC): Primary | ICD-10-CM

## (undated) DEVICE — ADHESIVE SKIN CLSR 0.7ML TOP DERMBND ADV

## (undated) DEVICE — SINGLE PORT MANIFOLD: Brand: NEPTUNE 2

## (undated) DEVICE — FORCEPS BX L240CM JAW DIA2.8MM L CAP W/ NDL MIC MESH TOOTH

## (undated) DEVICE — SUTURE ABSORBABLE BRAIDED 0 CT-1 8X27 IN UD VICRYL JJ41G

## (undated) DEVICE — MAJOR GENERAL: Brand: MEDLINE INDUSTRIES, INC.

## (undated) DEVICE — SURGICAL PROCEDURE PACK TISS 3X5 IN ABSORBABLE SEPRAFILM

## (undated) DEVICE — SYRINGE, LUER SLIP, STERILE, 60ML: Brand: MEDLINE

## (undated) DEVICE — YANKAUER,BULB TIP,W/O VENT,RIGID,STERILE: Brand: MEDLINE

## (undated) DEVICE — SYRINGE MED 3ML CLR PLAS STD N CTRL LUERLOCK TIP DISP

## (undated) DEVICE — APPLICATOR MEDICATED 26 CC SOLUTION HI LT ORNG CHLORAPREP

## (undated) DEVICE — CANNULA NSL ORAL AD FOR CAPNOFLEX CO2 O2 AIRLFE

## (undated) DEVICE — SEALER ENDOSCP NANO COAT OPN DIV CRV L JAW LIGASURE IMPACT

## (undated) DEVICE — KENDALL RADIOLUCENT FOAM MONITORING ELECTRODE RECTANGULAR SHAPE: Brand: KENDALL

## (undated) DEVICE — WOUND RETRACTOR AND PROTECTOR: Brand: ALEXIS O WOUND PROTECTOR-RETRACTOR

## (undated) DEVICE — CONNECTOR TBNG OD5-7MM O2 END DISP

## (undated) DEVICE — SYRINGE MED 10ML LUERLOCK TIP W/O SFTY DISP

## (undated) DEVICE — AIRLIFE™ OXYGEN TUBING 7 FEET (2.1 M) CRUSH RESISTANT OXYGEN TUBING, VINYL TIPPED: Brand: AIRLIFE™

## (undated) DEVICE — AGENT HEMSTAT 3GM OXIDIZED REGENERATED CELOS ABSRB FOR CONT (ORDER MULTIPLES OF 5EA)

## (undated) DEVICE — CONTAINER FORMALIN PREFILLED 10% NBF 60ML

## (undated) DEVICE — HERCULES 3 STAGE BALLOON ESOPHAGEAL: Brand: HERCULES

## (undated) DEVICE — ENDOSCOPIC KIT 1.1+ OP4 CA DE 2 GWN AAMI LEVEL 3

## (undated) DEVICE — SUTURE VCRL SZ 3-0 L27IN ABSRB UD L26MM SH 1/2 CIR J416H

## (undated) DEVICE — SUTURE VCRL SZ 4-0 L27IN ABSRB UD L19MM PS-2 3/8 CIR PRIM J426H

## (undated) DEVICE — FORCEPS BX L240CM JAW DIA2.4MM ORNG L CAP W/ NDL DISP RAD

## (undated) DEVICE — GAUZE,SPONGE,4"X4",12PLY,WOVEN,NS,LF: Brand: MEDLINE

## (undated) DEVICE — GUIDEWIRE WITH SPRING TIP - SINGLE USE: Brand: SAFEGUIDE®

## (undated) DEVICE — SYRINGE INFL 60ML DISP ALLIANCE II

## (undated) DEVICE — BLOCK BITE AD 60FR W/ VELC STRP ADDRESSES MOST PT AND

## (undated) DEVICE — NEEDLE SYR 18GA L1.5IN RED PLAS HUB S STL BLNT FILL W/O

## (undated) DEVICE — SUTURE PDS II SZ 1 L96IN ABSRB VLT TP-1 L65MM 1/2 CIR Z880G

## (undated) DEVICE — SUTURE VCRL SZ 2-0 L36IN ABSRB UD L36MM CT-1 1/2 CIR J945H

## (undated) DEVICE — LUBE JELLY FOIL PACK 1.4 OZ: Brand: MEDLINE INDUSTRIES, INC.

## (undated) DEVICE — WET SKIN PREP TRAY: Brand: MEDLINE INDUSTRIES, INC.